# Patient Record
Sex: MALE | Race: WHITE | NOT HISPANIC OR LATINO | URBAN - METROPOLITAN AREA
[De-identification: names, ages, dates, MRNs, and addresses within clinical notes are randomized per-mention and may not be internally consistent; named-entity substitution may affect disease eponyms.]

---

## 2016-06-30 RX ORDER — METFORMIN HYDROCHLORIDE 850 MG/1
1 TABLET ORAL
Qty: 0 | Refills: 0 | COMMUNITY
Start: 2016-06-30

## 2017-08-21 ENCOUNTER — INPATIENT (INPATIENT)
Facility: HOSPITAL | Age: 62
LOS: 0 days | Discharge: ANOTHER HOSPITAL | DRG: 313 | End: 2017-08-21
Attending: INTERNAL MEDICINE | Admitting: INTERNAL MEDICINE
Payer: MEDICAID

## 2017-08-21 ENCOUNTER — INPATIENT (INPATIENT)
Facility: HOSPITAL | Age: 62
LOS: 0 days | Discharge: HOME | DRG: 287 | End: 2017-08-21
Attending: INTERNAL MEDICINE | Admitting: INTERNAL MEDICINE
Payer: MEDICAID

## 2017-08-21 ENCOUNTER — TRANSCRIPTION ENCOUNTER (OUTPATIENT)
Age: 62
End: 2017-08-21

## 2017-08-21 VITALS
SYSTOLIC BLOOD PRESSURE: 161 MMHG | DIASTOLIC BLOOD PRESSURE: 87 MMHG | HEIGHT: 70.08 IN | OXYGEN SATURATION: 98 % | WEIGHT: 259.93 LBS | RESPIRATION RATE: 18 BRPM | HEART RATE: 60 BPM | TEMPERATURE: 98 F

## 2017-08-21 VITALS
HEIGHT: 71 IN | SYSTOLIC BLOOD PRESSURE: 164 MMHG | HEART RATE: 74 BPM | RESPIRATION RATE: 16 BRPM | OXYGEN SATURATION: 95 % | TEMPERATURE: 98 F | WEIGHT: 265 LBS | DIASTOLIC BLOOD PRESSURE: 79 MMHG

## 2017-08-21 VITALS
DIASTOLIC BLOOD PRESSURE: 74 MMHG | TEMPERATURE: 98 F | OXYGEN SATURATION: 97 % | HEART RATE: 93 BPM | SYSTOLIC BLOOD PRESSURE: 149 MMHG | RESPIRATION RATE: 18 BRPM

## 2017-08-21 VITALS — WEIGHT: 263.89 LBS

## 2017-08-21 DIAGNOSIS — I10 ESSENTIAL (PRIMARY) HYPERTENSION: Chronic | ICD-10-CM

## 2017-08-21 DIAGNOSIS — Z95.5 PRESENCE OF CORONARY ANGIOPLASTY IMPLANT AND GRAFT: Chronic | ICD-10-CM

## 2017-08-21 DIAGNOSIS — R07.89 OTHER CHEST PAIN: ICD-10-CM

## 2017-08-21 DIAGNOSIS — R07.9 CHEST PAIN, UNSPECIFIED: ICD-10-CM

## 2017-08-21 LAB
ANION GAP SERPL CALC-SCNC: 11 MMOL/L — SIGNIFICANT CHANGE UP (ref 5–17)
BASOPHILS # BLD AUTO: 0.1 K/UL — SIGNIFICANT CHANGE UP (ref 0–0.2)
BASOPHILS NFR BLD AUTO: 1.5 % — SIGNIFICANT CHANGE UP (ref 0–2)
BUN SERPL-MCNC: 10 MG/DL — SIGNIFICANT CHANGE UP (ref 7–18)
CALCIUM SERPL-MCNC: 9 MG/DL — SIGNIFICANT CHANGE UP (ref 8.4–10.5)
CHLORIDE SERPL-SCNC: 107 MMOL/L — SIGNIFICANT CHANGE UP (ref 96–108)
CK MB BLD-MCNC: 1.4 % — SIGNIFICANT CHANGE UP (ref 0–3.5)
CK MB CFR SERPL CALC: 1.6 NG/ML — SIGNIFICANT CHANGE UP (ref 0–3.6)
CK SERPL-CCNC: 112 U/L — SIGNIFICANT CHANGE UP (ref 35–232)
CO2 SERPL-SCNC: 24 MMOL/L — SIGNIFICANT CHANGE UP (ref 22–31)
CREAT SERPL-MCNC: 0.82 MG/DL — SIGNIFICANT CHANGE UP (ref 0.5–1.3)
EOSINOPHIL # BLD AUTO: 0.1 K/UL — SIGNIFICANT CHANGE UP (ref 0–0.5)
EOSINOPHIL NFR BLD AUTO: 2.1 % — SIGNIFICANT CHANGE UP (ref 0–6)
GLUCOSE SERPL-MCNC: 108 MG/DL — HIGH (ref 70–99)
HCT VFR BLD CALC: 45.5 % — SIGNIFICANT CHANGE UP (ref 39–50)
HGB BLD-MCNC: 15.2 G/DL — SIGNIFICANT CHANGE UP (ref 13–17)
LYMPHOCYTES # BLD AUTO: 1.6 K/UL — SIGNIFICANT CHANGE UP (ref 1–3.3)
LYMPHOCYTES # BLD AUTO: 30.1 % — SIGNIFICANT CHANGE UP (ref 13–44)
MCHC RBC-ENTMCNC: 29.6 PG — SIGNIFICANT CHANGE UP (ref 27–34)
MCHC RBC-ENTMCNC: 33.3 GM/DL — SIGNIFICANT CHANGE UP (ref 32–36)
MCV RBC AUTO: 88.9 FL — SIGNIFICANT CHANGE UP (ref 80–100)
MONOCYTES # BLD AUTO: 0.5 K/UL — SIGNIFICANT CHANGE UP (ref 0–0.9)
MONOCYTES NFR BLD AUTO: 9.1 % — SIGNIFICANT CHANGE UP (ref 2–14)
NEUTROPHILS # BLD AUTO: 3.1 K/UL — SIGNIFICANT CHANGE UP (ref 1.8–7.4)
NEUTROPHILS NFR BLD AUTO: 57.2 % — SIGNIFICANT CHANGE UP (ref 43–77)
PLATELET # BLD AUTO: 191 K/UL — SIGNIFICANT CHANGE UP (ref 150–400)
POTASSIUM SERPL-MCNC: 3.6 MMOL/L — SIGNIFICANT CHANGE UP (ref 3.5–5.3)
POTASSIUM SERPL-SCNC: 3.6 MMOL/L — SIGNIFICANT CHANGE UP (ref 3.5–5.3)
RBC # BLD: 5.12 M/UL — SIGNIFICANT CHANGE UP (ref 4.2–5.8)
RBC # FLD: 12 % — SIGNIFICANT CHANGE UP (ref 10.3–14.5)
SODIUM SERPL-SCNC: 142 MMOL/L — SIGNIFICANT CHANGE UP (ref 135–145)
TROPONIN I SERPL-MCNC: <0.015 NG/ML — SIGNIFICANT CHANGE UP (ref 0–0.04)
WBC # BLD: 5.4 K/UL — SIGNIFICANT CHANGE UP (ref 3.8–10.5)
WBC # FLD AUTO: 5.4 K/UL — SIGNIFICANT CHANGE UP (ref 3.8–10.5)

## 2017-08-21 PROCEDURE — C1769: CPT

## 2017-08-21 PROCEDURE — 93458 L HRT ARTERY/VENTRICLE ANGIO: CPT | Mod: 26,GC

## 2017-08-21 PROCEDURE — 93458 L HRT ARTERY/VENTRICLE ANGIO: CPT

## 2017-08-21 PROCEDURE — 99152 MOD SED SAME PHYS/QHP 5/>YRS: CPT

## 2017-08-21 PROCEDURE — 99285 EMERGENCY DEPT VISIT HI MDM: CPT

## 2017-08-21 PROCEDURE — C1887: CPT

## 2017-08-21 PROCEDURE — 71020: CPT | Mod: 26

## 2017-08-21 PROCEDURE — 93010 ELECTROCARDIOGRAM REPORT: CPT

## 2017-08-21 PROCEDURE — 93005 ELECTROCARDIOGRAM TRACING: CPT

## 2017-08-21 PROCEDURE — C1894: CPT

## 2017-08-21 PROCEDURE — 99152 MOD SED SAME PHYS/QHP 5/>YRS: CPT | Mod: GC

## 2017-08-21 RX ORDER — SODIUM CHLORIDE 9 MG/ML
1000 INJECTION, SOLUTION INTRAVENOUS
Qty: 0 | Refills: 0 | Status: DISCONTINUED | OUTPATIENT
Start: 2017-08-21 | End: 2017-08-21

## 2017-08-21 RX ORDER — CLONAZEPAM 1 MG
1 TABLET ORAL
Qty: 0 | Refills: 0 | Status: DISCONTINUED | OUTPATIENT
Start: 2017-08-21 | End: 2017-08-21

## 2017-08-21 RX ORDER — DEXTROSE 50 % IN WATER 50 %
1 SYRINGE (ML) INTRAVENOUS ONCE
Qty: 0 | Refills: 0 | Status: DISCONTINUED | OUTPATIENT
Start: 2017-08-21 | End: 2017-08-21

## 2017-08-21 RX ORDER — AMLODIPINE BESYLATE 2.5 MG/1
1 TABLET ORAL
Qty: 30 | Refills: 0 | OUTPATIENT
Start: 2017-08-21 | End: 2017-09-20

## 2017-08-21 RX ORDER — NITROGLYCERIN 6.5 MG
0.3 CAPSULE, EXTENDED RELEASE ORAL
Qty: 0 | Refills: 0 | Status: DISCONTINUED | OUTPATIENT
Start: 2017-08-21 | End: 2017-08-21

## 2017-08-21 RX ORDER — MORPHINE SULFATE 50 MG/1
2 CAPSULE, EXTENDED RELEASE ORAL ONCE
Qty: 0 | Refills: 0 | Status: DISCONTINUED | OUTPATIENT
Start: 2017-08-21 | End: 2017-08-21

## 2017-08-21 RX ORDER — INSULIN LISPRO 100/ML
7 VIAL (ML) SUBCUTANEOUS
Qty: 0 | Refills: 0 | Status: DISCONTINUED | OUTPATIENT
Start: 2017-08-21 | End: 2017-08-21

## 2017-08-21 RX ORDER — DEXTROSE 50 % IN WATER 50 %
12.5 SYRINGE (ML) INTRAVENOUS ONCE
Qty: 0 | Refills: 0 | Status: DISCONTINUED | OUTPATIENT
Start: 2017-08-21 | End: 2017-08-21

## 2017-08-21 RX ORDER — INSULIN LISPRO 100/ML
VIAL (ML) SUBCUTANEOUS AT BEDTIME
Qty: 0 | Refills: 0 | Status: DISCONTINUED | OUTPATIENT
Start: 2017-08-21 | End: 2017-08-21

## 2017-08-21 RX ORDER — CLOPIDOGREL BISULFATE 75 MG/1
75 TABLET, FILM COATED ORAL DAILY
Qty: 0 | Refills: 0 | Status: DISCONTINUED | OUTPATIENT
Start: 2017-08-21 | End: 2017-08-21

## 2017-08-21 RX ORDER — INSULIN LISPRO 100/ML
VIAL (ML) SUBCUTANEOUS
Qty: 0 | Refills: 0 | Status: DISCONTINUED | OUTPATIENT
Start: 2017-08-21 | End: 2017-08-21

## 2017-08-21 RX ORDER — AMLODIPINE BESYLATE 2.5 MG/1
2.5 TABLET ORAL DAILY
Qty: 0 | Refills: 0 | Status: DISCONTINUED | OUTPATIENT
Start: 2017-08-21 | End: 2017-08-21

## 2017-08-21 RX ORDER — INSULIN GLARGINE 100 [IU]/ML
70 INJECTION, SOLUTION SUBCUTANEOUS AT BEDTIME
Qty: 0 | Refills: 0 | Status: DISCONTINUED | OUTPATIENT
Start: 2017-08-21 | End: 2017-08-21

## 2017-08-21 RX ORDER — METOPROLOL TARTRATE 50 MG
200 TABLET ORAL DAILY
Qty: 0 | Refills: 0 | Status: DISCONTINUED | OUTPATIENT
Start: 2017-08-21 | End: 2017-08-21

## 2017-08-21 RX ORDER — ASPIRIN/CALCIUM CARB/MAGNESIUM 324 MG
325 TABLET ORAL DAILY
Qty: 0 | Refills: 0 | Status: DISCONTINUED | OUTPATIENT
Start: 2017-08-21 | End: 2017-08-21

## 2017-08-21 RX ORDER — PANTOPRAZOLE SODIUM 20 MG/1
40 TABLET, DELAYED RELEASE ORAL
Qty: 0 | Refills: 0 | Status: DISCONTINUED | OUTPATIENT
Start: 2017-08-21 | End: 2017-08-21

## 2017-08-21 RX ORDER — TAMSULOSIN HYDROCHLORIDE 0.4 MG/1
0.4 CAPSULE ORAL AT BEDTIME
Qty: 0 | Refills: 0 | Status: DISCONTINUED | OUTPATIENT
Start: 2017-08-21 | End: 2017-08-21

## 2017-08-21 RX ORDER — ESCITALOPRAM OXALATE 10 MG/1
20 TABLET, FILM COATED ORAL DAILY
Qty: 0 | Refills: 0 | Status: DISCONTINUED | OUTPATIENT
Start: 2017-08-21 | End: 2017-08-21

## 2017-08-21 RX ORDER — DEXTROSE 50 % IN WATER 50 %
25 SYRINGE (ML) INTRAVENOUS ONCE
Qty: 0 | Refills: 0 | Status: DISCONTINUED | OUTPATIENT
Start: 2017-08-21 | End: 2017-08-21

## 2017-08-21 RX ORDER — ATORVASTATIN CALCIUM 80 MG/1
80 TABLET, FILM COATED ORAL AT BEDTIME
Qty: 0 | Refills: 0 | Status: DISCONTINUED | OUTPATIENT
Start: 2017-08-21 | End: 2017-08-21

## 2017-08-21 RX ORDER — RANOLAZINE 500 MG/1
500 TABLET, FILM COATED, EXTENDED RELEASE ORAL
Qty: 0 | Refills: 0 | Status: DISCONTINUED | OUTPATIENT
Start: 2017-08-21 | End: 2017-08-21

## 2017-08-21 RX ORDER — ASPIRIN/CALCIUM CARB/MAGNESIUM 324 MG
325 TABLET ORAL ONCE
Qty: 0 | Refills: 0 | Status: COMPLETED | OUTPATIENT
Start: 2017-08-21 | End: 2017-08-21

## 2017-08-21 RX ORDER — GLUCAGON INJECTION, SOLUTION 0.5 MG/.1ML
1 INJECTION, SOLUTION SUBCUTANEOUS ONCE
Qty: 0 | Refills: 0 | Status: DISCONTINUED | OUTPATIENT
Start: 2017-08-21 | End: 2017-08-21

## 2017-08-21 RX ADMIN — MORPHINE SULFATE 2 MILLIGRAM(S): 50 CAPSULE, EXTENDED RELEASE ORAL at 13:33

## 2017-08-21 RX ADMIN — Medication 325 MILLIGRAM(S): at 16:22

## 2017-08-21 RX ADMIN — Medication 0.3 MILLIGRAM(S): at 12:13

## 2017-08-21 NOTE — DISCHARGE NOTE ADULT - HOSPITAL COURSE
60 y/o male with PMHx of CAD, Coronary Stent, T 2 DM on insulin pump with Hg A1 C 8.6 ( endocrinologist Dr Hagen), GERD, GI Bleed, MI, Hemorrhoids, HTN, HLD, and Coronary Angiogram presents to the [facility] ED c/o CP x5 days. Pt states the pain feels like pressure in his chest and L arm, which worsened and felt like burning today. Pt also notes associated symptoms of dizziness and SOB. Pt states he took NTG to no relief of symptoms. Cardiac enzyme negative x 1  Pt denies fever, chills, nausea, vomiting, diaphoresis, leg swelling, or any other complaints. Insuli pump was removed and endocrine consult was called. Pt underwent a cardiac angiogram via RRA. Patient was found to have EF 40 - 45% pLAd  stent patent,  distal LAD 70% ( no intervention )  Cx NMl. RCA prox stent  mild - moderate FSR/distal RCA ISR mild. Right radial site without bleeding or hematoma. right hand with good cap refill. Increase Norvasc 5 mg po qd for max antianginal therapy. Discussed case with Endocrinolgy - patient will resume  insulin pump and follow up with his Endocrinologist dr Hagen

## 2017-08-21 NOTE — ED ADULT TRIAGE NOTE - CHIEF COMPLAINT QUOTE
c/o on and off Left sided chest pain x 5 days worsened today w/ dizziness and pain going to the left arm

## 2017-08-21 NOTE — ED PROVIDER NOTE - PROGRESS NOTE DETAILS
Case discussed with patients cardiologist Dr. Blue who wants the patient transferred for Cath.  He will arrange an accepting physician. Spoke with Dr. Blue whom now recommends admission here.  pt will be transferred as inpatient. medicine team has found an accepting physician.  Pt to be transferred.

## 2017-08-21 NOTE — CHART NOTE - NSCHARTNOTEFT_GEN_A_CORE
Patient is a transfer from [facility] with insulin pump in place. Insulin pump removed ( FS 91)  and endocrinology consult called. Pump settings reviewed with Dr Tesfaye. She recommend insulin pump remains off and patient be placed on  Lantus 70 units at HS, 7 units Humolog with each meal and moderate SSI coverage before meals and at HS. We will also check finger stick at 0300. Plan reviewed with patient and his wife and they agree with the plan

## 2017-08-21 NOTE — H&P CARDIOLOGY - HISTORY OF PRESENT ILLNESS
62 y/o male with PMHx of CAD, Coronary Stent, T 2 DM on insulin pump with Hg A1 C 8.6 ( endocrinologist Dr Hagen), GERD, GI Bleed, MI, Hemorrhoids, HTN, HLD, and Coronary Angiogram presents to the [facility] ED c/o CP x5 days. Pt states the pain feels like pressure in his chest and L arm, which worsened and felt like burning today. Pt also notes associated symptoms of dizziness and SOB. Pt states he took NTG to no relief of symptoms. Cardiac enzyme negative x 1  Pt denies fever, chills, nausea, vomiting, diaphoresis, leg swelling, or any other complaints. NKDA. PMD: Dr. Marks 62 y/o male with PMHx of CAD, Coronary Stent, T 2 DM on insulin pump with Hg A1 C 8.6 ( endocrinologist Dr Hagen), GERD, GI Bleed, MI, Hemorrhoids, HTN, HLD, and Coronary Angiogram presents to the [facility] ED c/o CP x5 days. Pt states the pain feels like pressure in his chest and L arm, which worsened and felt like burning today. Pt also notes associated symptoms of dizziness and SOB. Pt states he took NTG to no relief of symptoms. Cardiac enzyme negative x 1  Pt denies fever, chills, nausea, vomiting, diaphoresis, leg swelling, or any other complaints.     PMD: Dr. Shanks\  Insulin Pump [facility] [facility]

## 2017-08-21 NOTE — DISCHARGE NOTE ADULT - ADDITIONAL INSTRUCTIONS
No heavy lifting,  pushing, pulling with affected arm for 2 weeks.  No strenuous  activity  for 2 weeks. No sex for 1 week.  No driving for 2 days. You may shower 24 hours following procedure but avoid baths and swimming for 1 week. Check wrist site for bleeding and/or swelling daily following procedure. Call your doctor/cardiologist immediately should it occur or if you have increased/persistent pain at the site. Follow up with your cardiologist in 1- 2 weeks. You may call [facility] at [phone] or [phone] after office hours and weekends with any questions or concerns following your procedure.

## 2017-08-21 NOTE — DISCHARGE NOTE ADULT - NS AS ACTIVITY OBS
Walking-Indoors allowed/Do not drive or operate machinery/Do not make important decisions/No Heavy lifting/straining

## 2017-08-21 NOTE — DISCHARGE NOTE ADULT - CARE PLAN
Principal Discharge DX:	CAD (coronary artery disease)  Goal:	patient will be free of chest pain  Instructions for follow-up, activity and diet:	Coronary artery disease is a condition where the arteries the supply the heart muscle get clogges with fatty deposits & puts you at risk for a heart attack  Call your doctor if you have any new pain, pressure, or discomfort in the center of your chest, pain, tingling or discomfort in arms, back, neck, jaw, or stomach, shortness of breath, nausea, vomiting, burping or heartburn, sweating, cold and clammy skin, racing or abnormal heartbeat for more than 10 minutes or if they keep coming & going.  Call 911 and do not tr to get to hospital by care  You can help yourself with lefestyle changes (quitting smoking if you smoke), eat lots of fruits & vegetables & low fat dairy products, not a lot of meat & fatty foods, walk or some form of physical activity most days of the week, lose weight if you are overweight  Take your cardiac medication as prescribed to lower cholesterol, to lower blood pressure, aspirin to prevent blood clots, and diabetes control  Make sure to keep appointments with doctor for cardiac follow up care  Secondary Diagnosis:	Diabetes mellitus  Goal:	Your hemoglobin A1C will be at a normal range (normal range is from 6-8)  Instructions for follow-up, activity and diet:	Continue to follow with your primary care MD or your endocrinologist. Discuss what the goal hemoglobin A1C level is for you.  Follow a heart healthy diabetic diet. If you check your fingerstick glucose at home, call your MD if it is greater than 250mg/dL on 2 occasions or less than 100mg/dL on 2 occasions. Know signs of low blood sugar, such as: dizziness, shakiness, sweating, confusion, hunger, nervousness- drink 4 ounces apple juice if occurs and call your doctor. Know early signs of high blood sugar, such as: frequent urination, increased thirst, blurry vision, fatigue, headache - call your doctor if this occurs.  Secondary Diagnosis:	HTN (hypertension)  Goal:	Your blood pressure will be controlled.  Instructions for follow-up, activity and diet:	Continue with your blood pressure medications; eat a heart healthy diet with low salt diet; exercise regularly (consult with your physician or cardiologist first); maintain a heart healthy weight; if you smoke - quit (A resource to help you stop smoking is the [facility] for [employer] Control – phone number [phone].); include healthy ways to manage stress. Continue to follow with your primary care physician or cardiologist.  Secondary Diagnosis:	Hypercholesterolemia  Goal:	LDL<70  Instructions for follow-up, activity and diet:	Goal is to keep LDL<70. Continue with your cholesterol medications as prescribed. Eat a heart healthy diet that is low in saturated fats and salt, and includes whole grains, fruits, vegetables and lean protein; exercise regularly (consult with your physician or cardiologist first); maintain a heart healthy weight; if you smoke - quit (A resource to help you stop smoking is the [facility] for Tobacco Control – phone number [phone].). Continue to follow with your primary physician or cardiologist. Principal Discharge DX:	CAD (coronary artery disease)  Goal:	patient will be free of chest pain  Instructions for follow-up, activity and diet:	Coronary artery disease is a condition where the arteries the supply the heart muscle get clogges with fatty deposits & puts you at risk for a heart attack  Call your doctor if you have any new pain, pressure, or discomfort in the center of your chest, pain, tingling or discomfort in arms, back, neck, jaw, or stomach, shortness of breath, nausea, vomiting, burping or heartburn, sweating, cold and clammy skin, racing or abnormal heartbeat for more than 10 minutes or if they keep coming & going.  Call 911 and do not tr to get to hospital by care  You can help yourself with lefestyle changes (quitting smoking if you smoke), eat lots of fruits & vegetables & low fat dairy products, not a lot of meat & fatty foods, walk or some form of physical activity most days of the week, lose weight if you are overweight  Take your cardiac medication as prescribed to lower cholesterol, to lower blood pressure, aspirin to prevent blood clots, and diabetes control  Make sure to keep appointments with doctor for cardiac follow up care  Secondary Diagnosis:	Diabetes mellitus  Goal:	Your hemoglobin A1C will be at a normal range (normal range is from 6-8)  Instructions for follow-up, activity and diet:	Continue to follow with your primary care MD or your endocrinologist. Discuss what the goal hemoglobin A1C level is for you.  Follow a heart healthy diabetic diet. If you check your fingerstick glucose at home, call your MD if it is greater than 250mg/dL on 2 occasions or less than 100mg/dL on 2 occasions. Know signs of low blood sugar, such as: dizziness, shakiness, sweating, confusion, hunger, nervousness- drink 4 ounces apple juice if occurs and call your doctor. Know early signs of high blood sugar, such as: frequent urination, increased thirst, blurry vision, fatigue, headache - call your doctor if this occurs.  Secondary Diagnosis:	HTN (hypertension)  Goal:	Your blood pressure will be controlled.  Instructions for follow-up, activity and diet:	Continue with your blood pressure medications; eat a heart healthy diet with low salt diet; exercise regularly (consult with your physician or cardiologist first); maintain a heart healthy weight; if you smoke - quit (A resource to help you stop smoking is the [facility] for [employer] Control – phone number [phone].); include healthy ways to manage stress. Continue to follow with your primary care physician or cardiologist.  Secondary Diagnosis:	Hypercholesterolemia  Goal:	LDL<70  Instructions for follow-up, activity and diet:	Goal is to keep LDL<70. Continue with your cholesterol medications as prescribed. Eat a heart healthy diet that is low in saturated fats and salt, and includes whole grains, fruits, vegetables and lean protein; exercise regularly (consult with your physician or cardiologist first); maintain a heart healthy weight; if you smoke - quit (A resource to help you stop smoking is the [facility] for Tobacco Control – phone number [phone].). Continue to follow with your primary physician or cardiologist.

## 2017-08-21 NOTE — CHART NOTE - NSCHARTNOTEFT_GEN_A_CORE
Patient transferred to [facility] prior to H & P being signed.    Accepting Physician - Dr. Maxi Mendez  Cardiology - Dr. Ankit Yeh

## 2017-08-21 NOTE — CONSULT NOTE ADULT - SUBJECTIVE AND OBJECTIVE BOX
CHIEF COMPLAINT: Patient is a 61y old  Male who presents with a chief complaint of CP x 5 days, worse today, unrelieved by ntg    HPI:   Patient seen and examined.     PAST MEDICAL & SURGICAL HISTORY:  Hemorrhoids  History of coronary angiogram: in July of 2011 with STENTS to RPDA &amp; RCA  H/O: GI Bleed: post colonoscopy in 2010, treated with blood transfusion.  Hypercholesterolemia  Coronary Stent: from 1998 - 2011 (total 5 stents)  CAD (Coronary Artery Disease)  Heart Attack: 1998 with stent and 2001 with stent, stents again in approx 2006 and 2/08  GERD (Gastroesophageal Reflux Disease)  Diabetes Mellitus: with neuropathy  HTN  Benign essential HTN  Stented coronary artery: x 5  No Past Surgical History      Allergies    No Known Allergies    Intolerances        MEDICATIONS  (STANDING):  aspirin 325 milliGRAM(s) Oral Once      MEDICATIONS  (PRN):  nitroglycerin     SubLingual 0.3 milliGRAM(s) SubLingual every 5 minutes PRN Chest Pain       Medications up to date at time of exam.    FAMILY HISTORY:  Family history of diabetes mellitus  Family history of cardiac disorder      SOCIAL HISTORY  Smoking History: [   ] smoking/smoke exposure, [   ] former smoker, [   ] denies smoking  Living Condition: [   ] apartment, [   ] private house  Work History:   Travel History: denies recent travel  Illicit Substance Use: denies  Alcohol Use: denies    REVIEW OF SYSTEMS:    CONSTITUTIONAL:  denies fevers, chills, sweats, weight loss    HEENT:  denies diplopia or blurred vision, sore throat or runny nose.    CARDIOVASCULAR:  denies pressure, squeezing, +tightness, +heaviness about the chest; no palpitations.    RESPIRATORY:  + SOB, cough, , wheezing.    GASTROINTESTINAL:  denies abdominal pain, nausea, vomiting or diarrhea.    GENITOURINARY: denies dysuria, frequency or urgency.    NEUROLOGIC:  denies numbness, tingling, seizures or weakness.    PSYCHIATRIC:  denies disorder of thought or mood.    MSK: denies swelling, redness      PHYSICAL EXAMINATION:    GENERAL: The patient is a well-developed, well-nourished, in no apparent distress.     Vital Signs Last 24 Hrs  T(C): 36.8 (21 Aug 2017 11:28), Max: 36.8 (21 Aug 2017 11:28)  T(F): 98.3 (21 Aug 2017 11:28), Max: 98.3 (21 Aug 2017 11:28)  HR: 73 (21 Aug 2017 11:28) (60 - 73)  BP: 133/72 (21 Aug 2017 11:28) (133/72 - 161/87)  BP(mean): --  RR: 18 (21 Aug 2017 11:28) (18 - 18)  SpO2: 96% (21 Aug 2017 11:28) (96% - 98%)    HEENT: head is normocephalic and atraumatic.     NECK: supple, no palpable adenopathy.    LUNGS: clear to auscultation, no wheezing, rales, or rhonchi.    HEART: regular rate and rhythm + II/VI HSM @ LSB    ABDOMEN: soft, nontender, and nondistended.     EXTREMITIES: without any cyanosis, clubbing, rash, lesions or edema.    NEUROLOGIC: awake, alert.    SKIN: warm, dry, good turgor.      LABS:                        15.2   5.4   )-----------( 191      ( 21 Aug 2017 10:57 )             45.5     08-21    142  |  107  |  10  ----------------------------<  108<H>  3.6   |  24  |  0.82    Ca    9.0      21 Aug 2017 10:57            CARDIAC MARKERS ( 21 Aug 2017 10:57 )  <0.015 ng/mL / x     / 112 U/L / x     / 1.6 ng/mL                Troponin <0.015 08-21 @ 10:57   08-21 @ 10:57  CKMB -- 08-21 @ 10:57  .    MICROBIOLOGY: (if applicable)    RADIOLOGY & ADDITIONAL STUDIES:  EKG:   CXR:  ECHO:  TELE:    IMPRESSION: 61y Male PAST MEDICAL & SURGICAL HISTORY:  Hemorrhoids  History of coronary angiogram: in July of 2011 with STENTS to RPDA &amp; RCA  H/O: GI Bleed: post colonoscopy in 2010, treated with blood transfusion.  Hypercholesterolemia  Coronary Stent: from 1998 - 2011 (total 5 stents)  CAD (Coronary Artery Disease)  Heart Attack: 1998 with stent and 2001 with stent, stents again in approx 2006 and 2/08  GERD (Gastroesophageal Reflux Disease)  Diabetes Mellitus: with neuropathy  HTN  Benign essential HTN  Stented coronary artery: x 5  No Past Surgical History       Patient is well known to me hx of CAD w/ multiple stents. Patient p/w CP x 5 days, unrelieved by nitro    RECOMMENDATIONS:     - admit to tele, trend [facility]   - will xfer to [place] for cardiac cath w/ Dr. Ankit Yeh    - plan of care discussed w/ patient and wife

## 2017-08-21 NOTE — DISCHARGE NOTE ADULT - PLAN OF CARE
patient will be free of chest pain Coronary artery disease is a condition where the arteries the supply the heart muscle get clogges with fatty deposits & puts you at risk for a heart attack  Call your doctor if you have any new pain, pressure, or discomfort in the center of your chest, pain, tingling or discomfort in arms, back, neck, jaw, or stomach, shortness of breath, nausea, vomiting, burping or heartburn, sweating, cold and clammy skin, racing or abnormal heartbeat for more than 10 minutes or if they keep coming & going.  Call 911 and do not tr to get to hospital by care  You can help yourself with lefestyle changes (quitting smoking if you smoke), eat lots of fruits & vegetables & low fat dairy products, not a lot of meat & fatty foods, walk or some form of physical activity most days of the week, lose weight if you are overweight  Take your cardiac medication as prescribed to lower cholesterol, to lower blood pressure, aspirin to prevent blood clots, and diabetes control  Make sure to keep appointments with doctor for cardiac follow up care Continue to follow with your primary care MD or your endocrinologist. Discuss what the goal hemoglobin A1C level is for you.  Follow a heart healthy diabetic diet. If you check your fingerstick glucose at home, call your MD if it is greater than 250mg/dL on 2 occasions or less than 100mg/dL on 2 occasions. Know signs of low blood sugar, such as: dizziness, shakiness, sweating, confusion, hunger, nervousness- drink 4 ounces apple juice if occurs and call your doctor. Know early signs of high blood sugar, such as: frequent urination, increased thirst, blurry vision, fatigue, headache - call your doctor if this occurs. Your hemoglobin A1C will be at a normal range (normal range is from 6-8) Your blood pressure will be controlled. Continue with your blood pressure medications; eat a heart healthy diet with low salt diet; exercise regularly (consult with your physician or cardiologist first); maintain a heart healthy weight; if you smoke - quit (A resource to help you stop smoking is the [facility] for Tobacco Control – phone number [phone].); include healthy ways to manage stress. Continue to follow with your primary care physician or cardiologist. LDL<70 Goal is to keep LDL<70. Continue with your cholesterol medications as prescribed. Eat a heart healthy diet that is low in saturated fats and salt, and includes whole grains, fruits, vegetables and lean protein; exercise regularly (consult with your physician or cardiologist first); maintain a heart healthy weight; if you smoke - quit (A resource to help you stop smoking is the [facility] for Tobacco Control – phone number [phone].). Continue to follow with your primary physician or cardiologist.

## 2017-08-21 NOTE — DISCHARGE NOTE ADULT - FINDINGS/TREATMENT
Patient was found to have EF 40 - 45% pLAd  stent patent,  distal LAD 70% ( no intervention )  Cx NMl. RCA prox stent  mild - moderate FSR/distal RCA ISR mild.

## 2017-08-21 NOTE — CONSULT NOTE ADULT - SUBJECTIVE AND OBJECTIVE BOX
CHIEF COMPLAINT: Patient is a 61y old  Male who presents with a chief complaint of SOB    HPI:   Patient seen and examined.     PAST MEDICAL & SURGICAL HISTORY:  Hemorrhoids  History of coronary angiogram: in July of 2011 with STENTS to RPDA &amp; RCA  H/O: GI Bleed: post colonoscopy in 2010, treated with blood transfusion.  Hypercholesterolemia  Coronary Stent: from 1998 - 2011 (total 5 stents)  CAD (Coronary Artery Disease)  Heart Attack: 1998 with stent and 2001 with stent, stents again in approx 2006 and 2/08  GERD (Gastroesophageal Reflux Disease)  Diabetes Mellitus: with neuropathy  HTN  Benign essential HTN  Stented coronary artery: x 5  No Past Surgical History      Allergies    No Known Allergies    Intolerances        MEDICATIONS  (STANDING):      MEDICATIONS  (PRN):  nitroglycerin     SubLingual 0.3 milliGRAM(s) SubLingual every 5 minutes PRN Chest Pain   Medications up to date at time of exam.    FAMILY HISTORY:  Family history of diabetes mellitus (Mother)  Family history of cardiac disorder (Father, Mother, Grandparent)      SOCIAL HISTORY  Smoking History: [   ] smoking/smoke exposure, [   ] former smoker, [  ] denies smoking  Living Condition: [   ] apartment, [   ] private house  Work History:   Travel History: denies recent travel  Illicit Substance Use: denies  Alcohol Use: denies    REVIEW OF SYSTEMS:    CONSTITUTIONAL:  denies fevers, chills, sweats, weight loss    HEENT:  denies diplopia or blurred vision, sore throat or runny nose.    CARDIOVASCULAR:  denies pressure, squeezing, tightness, or heaviness about the chest; no palpitations.    RESPIRATORY:  denies SOB, cough, + , wheezing.    GASTROINTESTINAL:  denies abdominal pain, nausea, vomiting or diarrhea.    GENITOURINARY: denies dysuria, frequency or urgency.    NEUROLOGIC:  denies numbness, tingling, seizures or weakness.    PSYCHIATRIC:  denies disorder of thought or mood.    MSK: denies swelling, redness      PHYSICAL EXAMINATION:    GENERAL: The patient is a well-developed, well-nourished, in no apparent distress.     Vital Signs Last 24 Hrs  T(C): 36.6 (21 Aug 2017 17:29), Max: 36.8 (21 Aug 2017 11:28)  T(F): 97.8 (21 Aug 2017 17:29), Max: 98.3 (21 Aug 2017 11:28)  HR: 74 (21 Aug 2017 17:29) (60 - 93)  BP: 164/79 (21 Aug 2017 17:29) (133/72 - 164/79)  BP(mean): 107 (21 Aug 2017 17:29) (107 - 107)  RR: 16 (21 Aug 2017 17:29) (16 - 18)  SpO2: 95% (21 Aug 2017 17:29) (95% - 98%)   (if applicable)    Chest Tube (if applicable)    HEENT: Head is normocephalic and atraumatic. .    NECK: Supple, no palpable adenopathy.    LUNGS: Clear to auscultation, no wheezing, rales, or rhonchi.    HEART: Regular rate and rhythm without murmur.    ABDOMEN: Soft, nontender, and nondistended.  No hepatosplenomegaly is noted.    EXTREMITIES: Without any cyanosis, clubbing, rash, lesions or edema.    NEUROLOGIC: Awake, alert.    SKIN: Warm, dry, good turgor.      LABS:                        15.2   5.4   )-----------( 191      ( 21 Aug 2017 10:57 )             45.5     08-21    142  |  107  |  10  ----------------------------<  108<H>  3.6   |  24  |  0.82    Ca    9.0      21 Aug 2017 10:57            CARDIAC MARKERS ( 21 Aug 2017 10:57 )  <0.015 ng/mL / x     / 112 U/L / x     / 1.6 ng/mL                MICROBIOLOGY: (if applicable)    RADIOLOGY & ADDITIONAL STUDIES:  EKG:   CXR:  ECHO:    IMPRESSION: 61y Male PAST MEDICAL & SURGICAL HISTORY:  Hemorrhoids  History of coronary angiogram: in July of 2011 with STENTS to RPDA &amp; RCA  H/O: GI Bleed: post colonoscopy in 2010, treated with blood transfusion.  Hypercholesterolemia  Coronary Stent: from 1998 - 2011 (total 5 stents)  CAD (Coronary Artery Disease)  Heart Attack: 1998 with stent and 2001 with stent, stents again in approx 2006 and 2/08  GERD (Gastroesophageal Reflux Disease)  Diabetes Mellitus: with neuropathy  HTN  Benign essential HTN  Stented coronary artery: x 5  No Past Surgical History       p/w SOB, CP x 5 days.    RECOMMENDATIONS:     - patient being transferred to [place] for cardiac cath   - SOB likely 2/2 CP   - o2 supp as needed   - DVT and GI prophylaxis.

## 2017-08-21 NOTE — DISCHARGE NOTE ADULT - PRINCIPAL DIAGNOSIS
CAD (coronary artery disease) 38.1 week GA female born to a 24 y/o   mother via . Maternal history complicated by asthma. Pregnancy complicated by PROM>18 hours (19 hours, at 2000 day prior to delivery) and chlamydia treated during 1st month of pregnancy. No maternal fever. Maternal blood type A+. Prenatal labs negative and nonreactive except for nonimmune to rubella. GBS positive on , treated adequately w/ ampicillin x5.  ROM <18hrs with clear fluid. Baby born vigorous and crying spontaneously. Warmed, dried, stimulated. Apgars 6/8. Apgar of 6 due to poor tone and not crying during 1st minute of life.     Since admission to the  nursery (NBN), baby has been feeding well, stooling and making wet diapers. Vitals have remained stable. Baby received routine NBN care. Discharge weight 3395 g down from birthweight of 3515 g, 3.41%. The baby lost an acceptable percentage of birthweight. Stable for discharge to home after receiving routine  care education and instructions to follow up with pediatrician.    Bilirubin was 7.9 at 33 hours of life, which is low intermediate risk zone.   Please see below for CCHD, audiology and hepatitis vaccine status.     Gen: NAD; well-appearing  HEENT: NC/AT; AFOF; red reflex intact; ears and nose clinically patent, normally set; no tags ; oropharynx clear  Skin: pink, warm, well-perfused, no rash  Resp: CTAB, even, non-labored breathing  Cardiac: RRR, normal S1 and S2; no murmurs; 2+ femoral pulses b/l  Abd: soft, NT/ND; +BS; no HSM; umbilicus c/d/I, 3 vessels  Extremities: FROM; no crepitus; Hips: negative O/B  : Skip I; no abnormalities; no hernia; anus patent  Neuro: +summer, suck, grasp, Babinski; good tone throughout 38.1 week GA female born to a 22 y/o   mother via . Maternal history complicated by asthma. Pregnancy complicated by PROM>18 hours (19 hours, at 2000 day prior to delivery) and chlamydia treated during 1st month of pregnancy. No maternal fever. Maternal blood type A+. Prenatal labs negative and nonreactive except for nonimmune to rubella. GBS positive on , treated adequately w/ ampicillin x5.  ROM <18hrs with clear fluid. Baby born vigorous and crying spontaneously. Warmed, dried, stimulated. Apgars 6/8. Apgar of 6 due to poor tone and not crying during 1st minute of life.     Since admission to the  nursery (NBN), baby has been feeding well, stooling and making wet diapers. Vitals have remained stable. Baby received routine NBN care. Discharge weight 3395 g down from birthweight of 3515 g, 3.41%. The baby lost an acceptable percentage of birthweight. Stable for discharge to home after receiving routine  care education and instructions to follow up with pediatrician.    Bilirubin was 7.9 at 33 hours of life, which is low intermediate risk zone.   Please see below for CCHD, audiology and hepatitis vaccine status.     Pediatric Attending Addendum:  I have read and agree with above PGY1 Discharge Note except for any changes detailed below.   I have spent > 30 minutes with the patient and the patient's family on direct patient care and discharge planning.  Discharge note will be faxed to appropriate outpatient pediatrician.  Plan to follow-up per above.  Please see above weight and bilirubin.     On prenatal labs, mom was found to be rubella non-immune. Recommended that mom follow up with Ob-Gyn for rubella vaccine.    Discharge Exam:  GEN: NAD alert active  HEENT:  AFOF, +RR b/l, MMM  CHEST: nml s1/s2, RRR, no murmur, lungs cta b/l  Abd: soft/nt/nd +bs no hsm  umbilical stump c/d/i  Hips: neg Ortolani/Osuna  : TS1  Neuro: +grasp/suck/summer  Skin: no abnormal rash    Marisol Ruiz MD

## 2017-08-21 NOTE — H&P CARDIOLOGY - FAMILY HISTORY
Father  Still living? Unknown  Family history of cardiac disorder, Age at diagnosis: Age Unknown     Mother  Still living? No  Family history of cardiac disorder, Age at diagnosis: Age Unknown  Family history of diabetes mellitus, Age at diagnosis: Age Unknown     Grandparent  Still living? Unknown  Family history of cardiac disorder, Age at diagnosis: Age Unknown

## 2017-08-21 NOTE — DISCHARGE NOTE ADULT - MEDICATION SUMMARY - MEDICATIONS TO TAKE
I will START or STAY ON the medications listed below when I get home from the hospital:    aspirin 325 mg oral delayed release tablet  -- 1 tab(s) by mouth once a day  -- Indication: For heart    tamsulosin 0.4 mg oral capsule  -- 1 cap(s) by mouth once a day  -- Indication: For prostate    Ranexa 500 mg oral tablet, extended release  -- 1 tab(s) by mouth 2 times a day  -- Indication: For antianginal    KlonoPIN 1 mg oral tablet  -- 1 tab(s) by mouth 2 times a day, As Needed  -- Indication: For anxiety    Lexapro 20 mg oral tablet  -- 1 tab(s) by mouth once a day  -- Indication: For depression    Jentadueto 2.5 mg-500 mg oral tablet  -- 1 tab(s) by mouth 2 times a day  -- Indication: For diabetes    metFORMIN 500 mg oral tablet  -- 1 tab(s) by mouth 2 times a day resume on Thursday 8/24/17  -- Indication: For diabetes    Invokana 300 mg oral tablet  -- 1 tab(s) by mouth once a day  -- Indication: For diabetes    gemfibrozil 600 mg oral tablet  -- 1 tab(s) by mouth 2 times a day  -- Indication: For cholesterol    Crestor  -- 40 milligram(s) by mouth once a day  -- Indication: For cholesterol    niacin 500 mg oral capsule, extended release  -- 1 cap(s) by mouth once a day (at bedtime)  -- Indication: For cholesterol    Benicar HCT 40 mg-12.5 mg oral tablet  -- 1 tab(s) by mouth once a day  -- Indication: For blood pressure    clopidogrel 75 mg oral tablet  -- 1 tab(s) by mouth once a day  -- Indication: For heart    Ambien 10 mg oral tablet  -- 1 tab(s) by mouth once a day (at bedtime), As Needed insomnia  -- Indication: For sleep    metoprolol succinate 200 mg oral tablet, extended release  -- 1 tab(s) by mouth once a day  -- Indication: For heart    Norvasc 5 mg oral tablet  -- 1 tab(s) by mouth once a day MDD:one  -- It is very important that you take or use this exactly as directed.  Do not skip doses or discontinue unless directed by your doctor.  Some non-prescription drugs may aggravate your condition.  Read all labels carefully.  If a warning appears, check with your doctor before taking.    -- Indication: For blood pressure    Lovaza 1000 mg oral capsule  -- 2 cap(s) by mouth 2 times a day  -- Indication: For cholesterol    Protonix 40 mg oral delayed release tablet  -- 1 tab(s) by mouth once a day  -- Indication: For GERD    Drisdol 50,000 intl units oral capsule  -- 1 cap(s) by mouth once a week Monday  -- Indication: For supplement

## 2017-08-21 NOTE — DISCHARGE NOTE ADULT - MEDICATION SUMMARY - MEDICATIONS TO CHANGE
I will SWITCH the dose or number of times a day I take the medications listed below when I get home from the hospital:    Norvasc 2.5 mg oral tablet  -- 1 tab(s) by mouth once a day

## 2017-08-21 NOTE — H&P CARDIOLOGY - TOBACCO, LAST USE DATE, PROFILE
28-Oct-2012 Advancement Flap (Single) Text: The defect edges were debeveled with a #15 scalpel blade.  Given the location of the defect and the proximity to free margins a single advancement flap was deemed most appropriate.  Using a sterile surgical marker, an appropriate advancement flap was drawn incorporating the defect and placing the expected incisions within the relaxed skin tension lines where possible.    The area thus outlined was incised deep to adipose tissue with a #15 scalpel blade.  The skin margins were undermined to an appropriate distance in all directions utilizing iris scissors.

## 2017-08-21 NOTE — ED PROVIDER NOTE - OBJECTIVE STATEMENT
62 y/o male with PMHx of CAD, Coronary Stent, DM, GERD, GI Bleed, MI, Hemorrhoids, HTN, HLD, and Coronary Angiogram presents to the ED c/o CP x5 days. Pt states the pain feels like pressure in his chest and L arm, which worsened and felt like burning today. Pt also notes associated symptoms of dizziness and SOB. Pt states he took NTG to no relief of symptoms. Pt denies fever, chills, nausea, vomiting, diaphoresis, leg swelling, or any other complaints. NKDA. PMD: Dr. Shanks

## 2017-08-21 NOTE — ED PROVIDER NOTE - PMH
CAD (Coronary Artery Disease)    Coronary Stent  from 1998 - 2011 (total 5 stents)  Diabetes Mellitus  with neuropathy  GERD (Gastroesophageal Reflux Disease)    H/O: GI Bleed  post colonoscopy in 2010, treated with blood transfusion.  Heart Attack  1998 with stent and 2001 with stent, stents again in approx 2006 and 2/08  Hemorrhoids    History of coronary angiogram  in July of 2011 with STENTS to RPDA & RCA  HTN    Hypercholesterolemia

## 2017-08-21 NOTE — DISCHARGE NOTE ADULT - CARE PROVIDER_API CALL
Ac Blue), Cardiology Medicine  [address] [place] [address]  Phone: [phone]  Fax: [phone]

## 2017-08-24 DIAGNOSIS — K64.9 UNSPECIFIED HEMORRHOIDS: ICD-10-CM

## 2017-08-24 DIAGNOSIS — E78.5 HYPERLIPIDEMIA, UNSPECIFIED: ICD-10-CM

## 2017-08-24 DIAGNOSIS — I25.10 ATHEROSCLEROTIC HEART DISEASE OF NATIVE CORONARY ARTERY WITHOUT ANGINA PECTORIS: ICD-10-CM

## 2017-08-24 DIAGNOSIS — Z87.19 PERSONAL HISTORY OF OTHER DISEASES OF THE DIGESTIVE SYSTEM: ICD-10-CM

## 2017-08-24 DIAGNOSIS — Z95.5 PRESENCE OF CORONARY ANGIOPLASTY IMPLANT AND GRAFT: ICD-10-CM

## 2017-08-24 DIAGNOSIS — E11.40 TYPE 2 DIABETES MELLITUS WITH DIABETIC NEUROPATHY, UNSPECIFIED: ICD-10-CM

## 2017-08-24 DIAGNOSIS — I25.2 OLD MYOCARDIAL INFARCTION: ICD-10-CM

## 2017-08-24 DIAGNOSIS — R07.9 CHEST PAIN, UNSPECIFIED: ICD-10-CM

## 2017-08-24 DIAGNOSIS — I10 ESSENTIAL (PRIMARY) HYPERTENSION: ICD-10-CM

## 2017-08-24 DIAGNOSIS — K21.9 GASTRO-ESOPHAGEAL REFLUX DISEASE WITHOUT ESOPHAGITIS: ICD-10-CM

## 2017-12-15 PROCEDURE — 82550 ASSAY OF CK (CPK): CPT

## 2017-12-15 PROCEDURE — 82553 CREATINE MB FRACTION: CPT

## 2017-12-15 PROCEDURE — 84484 ASSAY OF TROPONIN QUANT: CPT

## 2017-12-15 PROCEDURE — G0378: CPT

## 2017-12-15 PROCEDURE — 80048 BASIC METABOLIC PNL TOTAL CA: CPT

## 2017-12-15 PROCEDURE — 93005 ELECTROCARDIOGRAM TRACING: CPT

## 2017-12-15 PROCEDURE — 99285 EMERGENCY DEPT VISIT HI MDM: CPT | Mod: 25

## 2017-12-15 PROCEDURE — 96374 THER/PROPH/DIAG INJ IV PUSH: CPT

## 2017-12-15 PROCEDURE — 85027 COMPLETE CBC AUTOMATED: CPT

## 2017-12-15 PROCEDURE — 71046 X-RAY EXAM CHEST 2 VIEWS: CPT

## 2018-01-03 ENCOUNTER — TRANSCRIPTION ENCOUNTER (OUTPATIENT)
Age: 63
End: 2018-01-03

## 2018-02-26 ENCOUNTER — OUTPATIENT (OUTPATIENT)
Dept: OUTPATIENT SERVICES | Facility: HOSPITAL | Age: 63
LOS: 1 days | End: 2018-02-26
Payer: MEDICAID

## 2018-02-26 DIAGNOSIS — I10 ESSENTIAL (PRIMARY) HYPERTENSION: Chronic | ICD-10-CM

## 2018-02-26 DIAGNOSIS — R07.9 CHEST PAIN, UNSPECIFIED: ICD-10-CM

## 2018-02-26 DIAGNOSIS — Z95.5 PRESENCE OF CORONARY ANGIOPLASTY IMPLANT AND GRAFT: Chronic | ICD-10-CM

## 2018-02-26 PROCEDURE — A9502: CPT

## 2018-02-26 PROCEDURE — 93017 CV STRESS TEST TRACING ONLY: CPT

## 2018-02-26 PROCEDURE — 78452 HT MUSCLE IMAGE SPECT MULT: CPT

## 2018-03-05 ENCOUNTER — OUTPATIENT (OUTPATIENT)
Dept: OUTPATIENT SERVICES | Facility: HOSPITAL | Age: 63
LOS: 1 days | End: 2018-03-05
Payer: MEDICAID

## 2018-03-05 VITALS
WEIGHT: 274.92 LBS | DIASTOLIC BLOOD PRESSURE: 105 MMHG | RESPIRATION RATE: 16 BRPM | HEART RATE: 68 BPM | SYSTOLIC BLOOD PRESSURE: 155 MMHG | TEMPERATURE: 98 F | OXYGEN SATURATION: 98 % | HEIGHT: 71 IN

## 2018-03-05 DIAGNOSIS — Z95.5 PRESENCE OF CORONARY ANGIOPLASTY IMPLANT AND GRAFT: Chronic | ICD-10-CM

## 2018-03-05 DIAGNOSIS — R93.1 ABNORMAL FINDINGS ON DIAGNOSTIC IMAGING OF HEART AND CORONARY CIRCULATION: ICD-10-CM

## 2018-03-05 DIAGNOSIS — I10 ESSENTIAL (PRIMARY) HYPERTENSION: Chronic | ICD-10-CM

## 2018-03-05 LAB
ALBUMIN SERPL ELPH-MCNC: 4.5 G/DL — SIGNIFICANT CHANGE UP (ref 3.3–5)
ALP SERPL-CCNC: 47 U/L — SIGNIFICANT CHANGE UP (ref 40–120)
ALT FLD-CCNC: 35 U/L RC — SIGNIFICANT CHANGE UP (ref 10–45)
ANION GAP SERPL CALC-SCNC: 13 MMOL/L — SIGNIFICANT CHANGE UP (ref 5–17)
AST SERPL-CCNC: 23 U/L — SIGNIFICANT CHANGE UP (ref 10–40)
BILIRUB SERPL-MCNC: 0.3 MG/DL — SIGNIFICANT CHANGE UP (ref 0.2–1.2)
BUN SERPL-MCNC: 13 MG/DL — SIGNIFICANT CHANGE UP (ref 7–23)
CALCIUM SERPL-MCNC: 9.5 MG/DL — SIGNIFICANT CHANGE UP (ref 8.4–10.5)
CHLORIDE SERPL-SCNC: 101 MMOL/L — SIGNIFICANT CHANGE UP (ref 96–108)
CO2 SERPL-SCNC: 26 MMOL/L — SIGNIFICANT CHANGE UP (ref 22–31)
CREAT SERPL-MCNC: 0.77 MG/DL — SIGNIFICANT CHANGE UP (ref 0.5–1.3)
GLUCOSE BLDC GLUCOMTR-MCNC: 135 MG/DL — HIGH (ref 70–99)
GLUCOSE BLDC GLUCOMTR-MCNC: 158 MG/DL — HIGH (ref 70–99)
GLUCOSE BLDC GLUCOMTR-MCNC: 178 MG/DL — HIGH (ref 70–99)
GLUCOSE SERPL-MCNC: 212 MG/DL — HIGH (ref 70–99)
HCT VFR BLD CALC: 39.7 % — SIGNIFICANT CHANGE UP (ref 39–50)
HGB BLD-MCNC: 13.5 G/DL — SIGNIFICANT CHANGE UP (ref 13–17)
MCHC RBC-ENTMCNC: 30.7 PG — SIGNIFICANT CHANGE UP (ref 27–34)
MCHC RBC-ENTMCNC: 34 GM/DL — SIGNIFICANT CHANGE UP (ref 32–36)
MCV RBC AUTO: 90.5 FL — SIGNIFICANT CHANGE UP (ref 80–100)
PLATELET # BLD AUTO: 180 K/UL — SIGNIFICANT CHANGE UP (ref 150–400)
POTASSIUM SERPL-MCNC: 4.1 MMOL/L — SIGNIFICANT CHANGE UP (ref 3.5–5.3)
POTASSIUM SERPL-SCNC: 4.1 MMOL/L — SIGNIFICANT CHANGE UP (ref 3.5–5.3)
PROT SERPL-MCNC: 7.6 G/DL — SIGNIFICANT CHANGE UP (ref 6–8.3)
RBC # BLD: 4.39 M/UL — SIGNIFICANT CHANGE UP (ref 4.2–5.8)
RBC # FLD: 12.5 % — SIGNIFICANT CHANGE UP (ref 10.3–14.5)
SODIUM SERPL-SCNC: 140 MMOL/L — SIGNIFICANT CHANGE UP (ref 135–145)
WBC # BLD: 4 K/UL — SIGNIFICANT CHANGE UP (ref 3.8–10.5)
WBC # FLD AUTO: 4 K/UL — SIGNIFICANT CHANGE UP (ref 3.8–10.5)

## 2018-03-05 PROCEDURE — 85027 COMPLETE CBC AUTOMATED: CPT

## 2018-03-05 PROCEDURE — C1887: CPT

## 2018-03-05 PROCEDURE — C1769: CPT

## 2018-03-05 PROCEDURE — 93010 ELECTROCARDIOGRAM REPORT: CPT

## 2018-03-05 PROCEDURE — 99205 OFFICE O/P NEW HI 60 MIN: CPT

## 2018-03-05 PROCEDURE — 82962 GLUCOSE BLOOD TEST: CPT

## 2018-03-05 PROCEDURE — 93005 ELECTROCARDIOGRAM TRACING: CPT

## 2018-03-05 PROCEDURE — C1894: CPT

## 2018-03-05 PROCEDURE — 99152 MOD SED SAME PHYS/QHP 5/>YRS: CPT

## 2018-03-05 PROCEDURE — 93454 CORONARY ARTERY ANGIO S&I: CPT | Mod: 26

## 2018-03-05 PROCEDURE — 80053 COMPREHEN METABOLIC PANEL: CPT

## 2018-03-05 PROCEDURE — 93454 CORONARY ARTERY ANGIO S&I: CPT

## 2018-03-05 RX ORDER — CLONAZEPAM 1 MG
1 TABLET ORAL
Qty: 0 | Refills: 0 | COMMUNITY

## 2018-03-05 NOTE — H&P CARDIOLOGY - HISTORY OF PRESENT ILLNESS
61 y/o [place] Male with PMhx of CAD, s/p 7 stents, DM2 ( on insulin pump, A 1c: 7.5 ), HTN, HLD, presents today for coronary angiogram. Patient states he has chest pain, 5-6/10, left sided, radiating to left arm, not related to any activity. Patient had a nuclear stress test which shows there is a large defect in the inferior wall that is fixed consistent with MI. There is small severe defect in infero lateral wall that is fixed consistent with MI. Akinetic inferior and infero- lateral wall with  EF is 31%. Seen & evaluated by cardiologist, Dr. Blue and recommends for cardiac cath. Denies palpitation dizziness/ syncope. 63 y/o Ethiopian Male with PMhx of CAD, s/p 7 stents, DM2 ( on insulin pump, A 1c: 7.5 ), HTN, HLD, presents today for coronary angiogram. Patient states he has chest pain, 5-6/10, left sided, radiating to left arm, not related to any activity. Patient had a nuclear stress test which shows there is a large defect in the inferior wall that is fixed consistent with MI. There is small severe defect in infero lateral wall that is fixed consistent with MI. Akinetic inferior and infero- lateral wall with  EF is 31%. Seen & evaluated by cardiologist, Dr. Blue and recommends for cardiac cath. Denies palpitation dizziness/ syncope.   Endo: Dr. Xiao So.

## 2018-03-05 NOTE — H&P CARDIOLOGY - PMH
CAD (Coronary Artery Disease)    Coronary Stent  from 1998 - 2011 (total 5 stents)  Diabetes Mellitus  with neuropathy  GERD (Gastroesophageal Reflux Disease)    H/O: GI Bleed  post colonoscopy in 2010, treated with blood transfusion.  Heart Attack  1998 with stent and 2001 with stent, stents again in approx 2006 and 2/08  Hemorrhoids    History of coronary angiogram  in July of 2011 with STENTS to RPDA & RCA  HTN    Hypercholesterolemia CAD (Coronary Artery Disease)    Coronary Stent  from 1998 - 2016 (total 7 stents)  Diabetes Mellitus  with neuropathy  GERD (Gastroesophageal Reflux Disease)    H/O: GI Bleed  post colonoscopy in 2010, treated with blood transfusion.  Heart Attack  1998 with stent and 2001 with stent, stents again in approx 2006 and 2/08  Hemorrhoids    History of coronary angiogram  in July of 2011 with STENTS to RPDA & RCA  HTN    Hypercholesterolemia

## 2018-11-01 ENCOUNTER — OUTPATIENT (OUTPATIENT)
Dept: OUTPATIENT SERVICES | Facility: HOSPITAL | Age: 63
LOS: 1 days | End: 2018-11-01
Payer: MEDICAID

## 2018-11-01 DIAGNOSIS — Z95.5 PRESENCE OF CORONARY ANGIOPLASTY IMPLANT AND GRAFT: Chronic | ICD-10-CM

## 2018-11-01 PROCEDURE — G9001: CPT

## 2018-11-26 ENCOUNTER — INPATIENT (INPATIENT)
Facility: HOSPITAL | Age: 63
LOS: 0 days | Discharge: HOME | DRG: 287 | End: 2018-11-26
Attending: INTERNAL MEDICINE | Admitting: INTERNAL MEDICINE
Payer: MEDICAID

## 2018-11-26 ENCOUNTER — TRANSCRIPTION ENCOUNTER (OUTPATIENT)
Age: 63
End: 2018-11-26

## 2018-11-26 VITALS
SYSTOLIC BLOOD PRESSURE: 121 MMHG | DIASTOLIC BLOOD PRESSURE: 74 MMHG | OXYGEN SATURATION: 96 % | HEART RATE: 79 BPM | RESPIRATION RATE: 18 BRPM

## 2018-11-26 VITALS — WEIGHT: 270.07 LBS

## 2018-11-26 DIAGNOSIS — Z95.5 PRESENCE OF CORONARY ANGIOPLASTY IMPLANT AND GRAFT: Chronic | ICD-10-CM

## 2018-11-26 DIAGNOSIS — R07.9 CHEST PAIN, UNSPECIFIED: ICD-10-CM

## 2018-11-26 LAB
ALBUMIN SERPL ELPH-MCNC: 4.8 G/DL — SIGNIFICANT CHANGE UP (ref 3.3–5)
ALP SERPL-CCNC: 43 U/L — SIGNIFICANT CHANGE UP (ref 40–120)
ALT FLD-CCNC: 32 U/L — SIGNIFICANT CHANGE UP (ref 10–45)
ANION GAP SERPL CALC-SCNC: 14 MMOL/L — SIGNIFICANT CHANGE UP (ref 5–17)
APTT BLD: 33.9 SEC — SIGNIFICANT CHANGE UP (ref 27.5–36.3)
AST SERPL-CCNC: 24 U/L — SIGNIFICANT CHANGE UP (ref 10–40)
BILIRUB SERPL-MCNC: 0.6 MG/DL — SIGNIFICANT CHANGE UP (ref 0.2–1.2)
BUN SERPL-MCNC: 13 MG/DL — SIGNIFICANT CHANGE UP (ref 7–23)
CALCIUM SERPL-MCNC: 10 MG/DL — SIGNIFICANT CHANGE UP (ref 8.4–10.5)
CHLORIDE SERPL-SCNC: 99 MMOL/L — SIGNIFICANT CHANGE UP (ref 96–108)
CO2 SERPL-SCNC: 23 MMOL/L — SIGNIFICANT CHANGE UP (ref 22–31)
CREAT SERPL-MCNC: 0.86 MG/DL — SIGNIFICANT CHANGE UP (ref 0.5–1.3)
GLUCOSE BLDC GLUCOMTR-MCNC: 113 MG/DL — HIGH (ref 70–99)
GLUCOSE BLDC GLUCOMTR-MCNC: 116 MG/DL — HIGH (ref 70–99)
GLUCOSE BLDC GLUCOMTR-MCNC: 124 MG/DL — HIGH (ref 70–99)
GLUCOSE SERPL-MCNC: 143 MG/DL — HIGH (ref 70–99)
HCT VFR BLD CALC: 44.1 % — SIGNIFICANT CHANGE UP (ref 39–50)
HGB BLD-MCNC: 15.7 G/DL — SIGNIFICANT CHANGE UP (ref 13–17)
INR BLD: 1.01 RATIO — SIGNIFICANT CHANGE UP (ref 0.88–1.16)
MCHC RBC-ENTMCNC: 31.2 PG — SIGNIFICANT CHANGE UP (ref 27–34)
MCHC RBC-ENTMCNC: 35.5 GM/DL — SIGNIFICANT CHANGE UP (ref 32–36)
MCV RBC AUTO: 88.1 FL — SIGNIFICANT CHANGE UP (ref 80–100)
NT-PROBNP SERPL-SCNC: 21 PG/ML — SIGNIFICANT CHANGE UP (ref 0–300)
PLATELET # BLD AUTO: 181 K/UL — SIGNIFICANT CHANGE UP (ref 150–400)
POTASSIUM SERPL-MCNC: 4 MMOL/L — SIGNIFICANT CHANGE UP (ref 3.5–5.3)
POTASSIUM SERPL-SCNC: 4 MMOL/L — SIGNIFICANT CHANGE UP (ref 3.5–5.3)
PROT SERPL-MCNC: 7.6 G/DL — SIGNIFICANT CHANGE UP (ref 6–8.3)
PROTHROM AB SERPL-ACNC: 11.6 SEC — SIGNIFICANT CHANGE UP (ref 10–12.9)
RBC # BLD: 5.01 M/UL — SIGNIFICANT CHANGE UP (ref 4.2–5.8)
RBC # FLD: 12.9 % — SIGNIFICANT CHANGE UP (ref 10.3–14.5)
SODIUM SERPL-SCNC: 136 MMOL/L — SIGNIFICANT CHANGE UP (ref 135–145)
TROPONIN T, HIGH SENSITIVITY RESULT: 14 NG/L — SIGNIFICANT CHANGE UP (ref 0–51)
TROPONIN T, HIGH SENSITIVITY RESULT: 14 NG/L — SIGNIFICANT CHANGE UP (ref 0–51)
WBC # BLD: 6 K/UL — SIGNIFICANT CHANGE UP (ref 3.8–10.5)
WBC # FLD AUTO: 6 K/UL — SIGNIFICANT CHANGE UP (ref 3.8–10.5)

## 2018-11-26 PROCEDURE — 93458 L HRT ARTERY/VENTRICLE ANGIO: CPT | Mod: 26,GC

## 2018-11-26 PROCEDURE — 71046 X-RAY EXAM CHEST 2 VIEWS: CPT | Mod: 26

## 2018-11-26 PROCEDURE — 99285 EMERGENCY DEPT VISIT HI MDM: CPT | Mod: 25

## 2018-11-26 PROCEDURE — 99223 1ST HOSP IP/OBS HIGH 75: CPT

## 2018-11-26 PROCEDURE — 99152 MOD SED SAME PHYS/QHP 5/>YRS: CPT | Mod: GC

## 2018-11-26 PROCEDURE — 76937 US GUIDE VASCULAR ACCESS: CPT | Mod: 26,GC

## 2018-11-26 PROCEDURE — 93010 ELECTROCARDIOGRAM REPORT: CPT

## 2018-11-26 RX ORDER — ASPIRIN/CALCIUM CARB/MAGNESIUM 324 MG
324 TABLET ORAL DAILY
Qty: 0 | Refills: 0 | Status: DISCONTINUED | OUTPATIENT
Start: 2018-11-26 | End: 2018-11-26

## 2018-11-26 RX ORDER — AMLODIPINE BESYLATE 2.5 MG/1
1 TABLET ORAL
Qty: 0 | Refills: 0 | COMMUNITY

## 2018-11-26 RX ORDER — METFORMIN HYDROCHLORIDE 850 MG/1
1 TABLET ORAL
Qty: 0 | Refills: 0 | COMMUNITY

## 2018-11-26 RX ADMIN — Medication 324 MILLIGRAM(S): at 10:56

## 2018-11-26 NOTE — DISCHARGE NOTE ADULT - MEDICATION SUMMARY - MEDICATIONS TO TAKE
I will START or STAY ON the medications listed below when I get home from the hospital:    aspirin 325 mg oral delayed release tablet  -- 1 tab(s) by mouth once a day  -- Indication: For Chest pain    Flomax 0.4 mg oral capsule  -- 1 cap(s) by mouth once a day  -- Indication: For enlarged prostate     nitroglycerin 0.4 mg sublingual tablet  -- 1 tab(s) under tongue every 5 minutes, As Needed  -- Indication: For Chest pain    Ranexa 500 mg oral tablet, extended release  -- 1 tab(s) by mouth 2 times a day  -- Indication: For Chest pain     Lexapro 20 mg oral tablet  -- 1 tab(s) by mouth once a day  -- Indication: For anti depressant     Janumet 50 mg-1000 mg oral tablet  -- 1 tab(s) by mouth 2 times a day  -- Indication: For diabetes    insulin  -- Insulin pump with Humalog ( mohchi)  -- Indication: For diabetes    metFORMIN 500 mg oral tablet, extended release  -- 1 tab(s) by mouth once a day  restart ib 11/29  -- Indication: For diabetes    Crestor  -- 40 milligram(s) by mouth once a day  -- Indication: For high cholesterol     niacin 500 mg oral capsule, extended release  -- 1 cap(s) by mouth once a day (at bedtime)  -- Indication: For high cholesterol     gemfibrozil 600 mg oral tablet  -- 1 tab(s) by mouth 2 times a day  -- Indication: For high cholesterol     clopidogrel 75 mg oral tablet  -- 1 tab(s) by mouth once a day  -- Indication: For Chest pain    Ambien 10 mg oral tablet  -- 1 tab(s) by mouth once a day (at bedtime), As Needed insomnia  -- Indication: For insomnia     metoprolol succinate 200 mg oral tablet, extended release  -- 1 tab(s) by mouth once a day  -- Indication: For high blood pressure    Protonix 40 mg oral delayed release tablet  -- 1 tab(s) by mouth once a day  -- Indication: For acid reflux     Drisdol 50,000 intl units oral capsule  -- 1 cap(s) by mouth once a week Monday  -- Indication: For supplement

## 2018-11-26 NOTE — DISCHARGE NOTE ADULT - PATIENT PORTAL LINK FT
You can access the [facility] Patient Portal, offered by [facility], by registering with the following website: [email or web address]

## 2018-11-26 NOTE — ED ADULT NURSE NOTE - OBJECTIVE STATEMENT
63 yr old male to ed via wheelchair, accomp by family, c/o substernal "burning" chest pain rad to l arm intermittently x 5-6 days.. Pt has h/o CAD (7 stents), DM with insulin pump. Pain at 5/10, : worse w exertion over the past 4 days.  Pt denies any fevers, chills cough. C/o  mild SOB w exertion.  Denies any recent travel.  Had cardiac cath in March 2018. No identifiable lesions for stenting. Made comfortable.

## 2018-11-26 NOTE — ED ADULT NURSE NOTE - NSIMPLEMENTINTERV_GEN_ALL_ED
Implemented All Universal Safety Interventions:  [employer] to call system. Call bell, personal items and telephone within reach. Instruct patient to call for assistance. Room bathroom lighting operational. Non-slip footwear when patient is off stretcher. Physically safe environment: no spills, clutter or unnecessary equipment. Stretcher in lowest position, wheels locked, appropriate side rails in place.

## 2018-11-26 NOTE — ED PROVIDER NOTE - PMH
CAD (Coronary Artery Disease)    Coronary Stent  from 1998 - 2016 (total 7 stents)  Diabetes Mellitus  with neuropathy  GERD (Gastroesophageal Reflux Disease)    H/O: GI Bleed  post colonoscopy in 2010, treated with blood transfusion.  Heart Attack  1998 with stent and 2001 with stent, stents again in approx 2006 and 2/08  Hemorrhoids    History of coronary angiogram  in July of 2011 with STENTS to RPDA & RCA  HTN    Hypercholesterolemia

## 2018-11-26 NOTE — DISCHARGE NOTE ADULT - CARE PLAN
Principal Discharge DX:	CAD (coronary artery disease)  Goal:	Pt remains chest pain free and understands post cath discharge instructions  Assessment and plan of treatment:	Low salt, low fat diet.   Weight management.   Take medications as prescribed.    No smoking.  Follow up appointments with your doctor(s)  as instruced.    No heavy lifting for 2 weeks, no strenuous activity  or uneccesary stair climbing, no driving for x 2 days,  you may shower 24 hours following procedure but no bathing or swimming for x1  week, no bending, no straining while having a Bowel movement, No strenuous sexual activity for x 1 week check groin for bleeding, pain, tightness or ( golf ball size)  swelling daily following procedure , & follow up with your cardiologist in 1-2 week  Secondary Diagnosis:	Diabetes mellitus  Goal:	Your hemoglobin A1C will be at a normal range (normal range is from 6-8)  Assessment and plan of treatment:	Continue to follow with your primary care MD or your endocrinologist. Discuss what the goal hemoglobin A1C level is for you.  Follow a heart healthy diabetic diet. If you check your fingerstick glucose at home, call your MD if it is greater than 250mg/dL on 2 occasions or less than 100mg/dL on 2 occasions. Know signs of low blood sugar, such as: dizziness, shakiness, sweating, confusion, hunger, nervousness- drink 4 ounces apple juice if occurs and call your doctor. Know early signs of high blood sugar, such as: frequent urination, increased thirst, blurry vision, fatigue, headache - call your doctor if this occurs.  Secondary Diagnosis:	HTN (hypertension)  Goal:	Your blood pressure will be controlled.  Assessment and plan of treatment:	Continue with your blood pressure medications; eat a heart healthy diet with low salt diet; exercise regularly (consult with your physician or cardiologist first); maintain a heart healthy weight; if you smoke - quit (A resource to help you stop smoking is the [facility] Tobacco [facility] – phone number [phone].); include healthy ways to manage stress. Continue to follow with your primary care physician or cardiologist.  Secondary Diagnosis:	HLD (hyperlipidemia)  Goal:	Your LDL cholesterol will be less than 70mg/dL  Assessment and plan of treatment:	Continue with your cholesterol medications. Eat a heart healthy diet that is low in saturated fats and salt, and includes whole grains, fruits, vegetables and lean protein; exercise regularly (consult with your physician or cardiologist first); maintain a heart healthy weight; if you smoke - quit (A resource to help you stop smoking is the [facility] – phone number [phone].). Continue to follow with your primary physician or cardiologist.  Secondary Diagnosis:	GERD (gastroesophageal reflux disease)  Goal:	Will remain free of acid reflux episodes  Assessment and plan of treatment:	Continue current antiacid medication. Avoid foods that potentially exacerbate GERD s&s ,such as: spicy foods, citrus fruits

## 2018-11-26 NOTE — ED PROVIDER NOTE - MEDICAL DECISION MAKING DETAILS
Em:  chest pain r/o ACS work up, cardiology consult, labs, xrays, high risk for ACS although recent negative cath with same sx unclear.

## 2018-11-26 NOTE — DISCHARGE NOTE ADULT - HOSPITAL COURSE
63 y/o [place] Male with PMHx of MI, CAD with multiple stents, DMT2 (on insulin pump, [employer] A1C: 7.5, managed by Dr. Xiao So. ), HTN, HLD, GERD presented to the ER today c/o 4 to 5 days of intermittent 5/10 substernal chest pain with SOB radiated to left arm worse with exertion. Patient reports chest pain last night, unable to sleep, took nitro SL x3 last night without pain relief. Chest pain increasing in intensity and frequency, came to ED. Patient here today for cardiac cath for further ischemic workup    Endo: Dr. Johnson notified about insulin pump    Last Cath in 3/2018 showed as below. (03.05.18 @ 11:12) >  CORONARY VESSELS: The coronary circulation is right dominant.   LM:   --  LM: Angiography showed minor luminal irregularities with no flow limiting lesions.  LAD:   --  Proximal LAD: There was a 20 % stenosis at the site of a prior stent.  CX:   --  Circumflex: Angiography showed minor luminal irregularities with no flow limiting lesions.  RCA:   --  Distal RCA: There was a 20 % stenosis at the site of a prior stent.  < end of copied text >          Cardiologist, Dr. Blue and recommends for cardiac cath. Denies palpitation dizziness/ syncope.   Endo: Dr. Xiao So.

## 2018-11-26 NOTE — ED PROVIDER NOTE - PHYSICAL EXAMINATION
Gen:  alert, awake, no acute distress, obese  HEENT:  atraumatic head, airway clear, pupils equal and round  CV:  rrr, nl S1, S2, no m/r/g  Pulm:  lungs CTA b/l  Abd: s/nt/nd, +BS  Ext:  moving all extremities  Neuro:  grossly intact, no focal deficits  Skin:  clear, dry, intact  Psych: AOx3, normal affect, no apparent risk to self or others

## 2018-11-26 NOTE — DISCHARGE NOTE ADULT - CARE PROVIDER_API CALL
Ac Blue), Cardiology Medicine  [address] [place] [address]  Phone: [phone]  Fax: [phone]

## 2018-11-26 NOTE — H&P CARDIOLOGY - FAMILY HISTORY
Family history of diabetes mellitus     Grandparent  Still living? Unknown  Family history of cardiac disorder, Age at diagnosis: Age Unknown

## 2018-11-26 NOTE — ED PROVIDER NOTE - OBJECTIVE STATEMENT
pt with CAD (7 stents), DM with insulin pump, reports sub sternal chest pain 5/10, intermittent, radiates to L arm and worse w exertion over the past 4 days.  pt denies any fevers, cough, some mild SOB w exertion.  denies any recent travel.  had cardiac cath in March 2018 with no identifiable lesions for stenting.

## 2018-11-26 NOTE — DISCHARGE NOTE ADULT - PLAN OF CARE
Pt remains chest pain free and understands post cath discharge instructions Low salt, low fat diet.   Weight management.   Take medications as prescribed.    No smoking.  Follow up appointments with your doctor(s)  as instruced.    No heavy lifting for 2 weeks, no strenuous activity  or uneccesary stair climbing, no driving for x 2 days,  you may shower 24 hours following procedure but no bathing or swimming for x1  week, no bending, no straining while having a Bowel movement, No strenuous sexual activity for x 1 week check groin for bleeding, pain, tightness or ( golf ball size)  swelling daily following procedure , & follow up with your cardiologist in 1-2 week Your hemoglobin A1C will be at a normal range (normal range is from 6-8) Continue to follow with your primary care MD or your endocrinologist. Discuss what the goal hemoglobin A1C level is for you.  Follow a heart healthy diabetic diet. If you check your fingerstick glucose at home, call your MD if it is greater than 250mg/dL on 2 occasions or less than 100mg/dL on 2 occasions. Know signs of low blood sugar, such as: dizziness, shakiness, sweating, confusion, hunger, nervousness- drink 4 ounces apple juice if occurs and call your doctor. Know early signs of high blood sugar, such as: frequent urination, increased thirst, blurry vision, fatigue, headache - call your doctor if this occurs. Your blood pressure will be controlled. Continue with your blood pressure medications; eat a heart healthy diet with low salt diet; exercise regularly (consult with your physician or cardiologist first); maintain a heart healthy weight; if you smoke - quit (A resource to help you stop smoking is the [facility] – phone number [phone].); include healthy ways to manage stress. Continue to follow with your primary care physician or cardiologist. Your LDL cholesterol will be less than 70mg/dL Continue with your cholesterol medications. Eat a heart healthy diet that is low in saturated fats and salt, and includes whole grains, fruits, vegetables and lean protein; exercise regularly (consult with your physician or cardiologist first); maintain a heart healthy weight; if you smoke - quit (A resource to help you stop smoking is the [facility] – phone number [phone].). Continue to follow with your primary physician or cardiologist. Will remain free of acid reflux episodes Continue current antiacid medication. Avoid foods that potentially exacerbate GERD s&s ,such as: spicy foods, citrus fruits

## 2018-11-26 NOTE — ED PROVIDER NOTE - PROGRESS NOTE DETAILS
Demetria MURO: spoke with pts cardiologist Dr Forte who would like pt admitted for cath cardiology at bedside

## 2018-11-26 NOTE — H&P CARDIOLOGY - HISTORY OF PRESENT ILLNESS
61 y/o German Male with PMHx of CAD, s/p 7 stents, DM2 (on insulin pump, A1C: 7.5 ), HTN, HLD, presented to ER today c/o 4 days of intermittent 5/10 substernal chest pain with SOB radiated to left arm worse with exertion. Troponin x 2 negative in ED,       Cardiologist, Dr. Blue and recommends for cardiac cath. Denies palpitation dizziness/ syncope.   Endo: Dr. Xiao So. 61 y/o Wallisian Male with PMHx of MI, CAD, s/p 7 stents, DM2 (on insulin pump, A1C: 7.5 ), HTN, HLD, GERD presented ER today c/o 4 days of intermittent 5/10 substernal chest pain with SOB radiated to left arm worse with exertion. Troponin x 2 negative in ED,       Last Cath in 3/2018 showed as below.   < from: Cardiac Cath Lab - Adult (03.05.18 @ 11:12) >  CORONARY VESSELS: The coronary circulation is right dominant.   LM:   --  LM: Angiography showed minor luminal irregularities with no flow limiting lesions.  LAD:   --  Proximal LAD: There was a 20 % stenosis at the site of a prior stent.  CX:   --  Circumflex: Angiography showed minor luminal irregularities with no flow limiting lesions.  RCA:   --  Distal RCA: There was a 20 % stenosis at the site of a prior stent.  < end of copied text >          Cardiologist, Dr. Blue and recommends for cardiac cath. Denies palpitation dizziness/ syncope.   Endo: Dr. Xiao So. 61 y/o [place]vian Male with PMHx of MI, CAD with multiple stents, DMT2 (on insulin pump, A1C: 7.5, managed by Dr. Xiao So. ), HTN, HLD, GERD presented to the ER today c/o 4 days of intermittent 5/10 substernal chest pain with SOB radiated to left arm worse with exertion. Troponin x 2 negative in ED       Last Cath in 3/2018 showed as below. (03.05.18 @ 11:12) >  CORONARY VESSELS: The coronary circulation is right dominant.   LM:   --  LM: Angiography showed minor luminal irregularities with no flow limiting lesions.  LAD:   --  Proximal LAD: There was a 20 % stenosis at the site of a prior stent.  CX:   --  Circumflex: Angiography showed minor luminal irregularities with no flow limiting lesions.  RCA:   --  Distal RCA: There was a 20 % stenosis at the site of a prior stent.  < end of copied text >          Cardiologist, Dr. Blue and recommends for cardiac cath. Denies palpitation dizziness/ syncope.   Endo: Dr. Xiao So. 61 y/o [place] Male with PMHx of MI, CAD with multiple stents, DMT2 (on insulin pump, A1C: 7.5, managed by Dr. Xiao So. ), HTN, HLD, GERD presented to the ER today c/o 4 to 5 days of intermittent 5/10 substernal chest pain with SOB radiated to left arm worse with exertion. Patient reports chest pain last night, unable to sleep, took nitro SL x3 last night without pain relief. Chest pain increasing in intensity and frequency      Last Cath in 3/2018 showed as below. (03.05.18 @ 11:12) >  CORONARY VESSELS: The coronary circulation is right dominant.   LM:   --  LM: Angiography showed minor luminal irregularities with no flow limiting lesions.  LAD:   --  Proximal LAD: There was a 20 % stenosis at the site of a prior stent.  CX:   --  Circumflex: Angiography showed minor luminal irregularities with no flow limiting lesions.  RCA:   --  Distal RCA: There was a 20 % stenosis at the site of a prior stent.  < end of copied text >          Cardiologist, Dr. Blue and recommends for cardiac cath. Denies palpitation dizziness/ syncope.   Endo: Dr. Xiao So. 61 y/o Taj[place] Male with PMHx of MI, CAD with multiple stents, DMT2 (on insulin pump, [employer] A1C: 7.5, managed by Dr. Xiao So. ), HTN, HLD, GERD presented to the ER today c/o 4 to 5 days of intermittent 5/10 substernal chest pain with SOB radiated to left arm worse with exertion. Patient reports chest pain last night, unable to sleep, took nitro SL x3 last night without pain relief. Chest pain increasing in intensity and frequency, came to ED    Endo: Dr. Johnson     Last Cath in 3/2018 showed as below. (03.05.18 @ 11:12) >  CORONARY VESSELS: The coronary circulation is right dominant.   LM:   --  LM: Angiography showed minor luminal irregularities with no flow limiting lesions.  LAD:   --  Proximal LAD: There was a 20 % stenosis at the site of a prior stent.  CX:   --  Circumflex: Angiography showed minor luminal irregularities with no flow limiting lesions.  RCA:   --  Distal RCA: There was a 20 % stenosis at the site of a prior stent.  < end of copied text >          Cardiologist, Dr. Blue and recommends for cardiac cath. Denies palpitation dizziness/ syncope.   Endo: Dr. Xiao So. 61 y/o [place] Male with PMHx of MI, CAD with multiple stents, DMT2 (on insulin pump, [employer] A1C: 7.5, managed by Dr. Xiao So. ), HTN, HLD, GERD presented to the ER today c/o 4 to 5 days of intermittent 5/10 substernal chest pain with SOB radiated to left arm worse with exertion. Patient reports chest pain last night, unable to sleep, took nitro SL x3 last night without pain relief. Chest pain increasing in intensity and frequency, came to ED. Patient here today for cardiac cath for further ischemic workup    Endo: Dr. Johnson notified about insulin pump    Last Cath in 3/2018 showed as below. (03.05.18 @ 11:12) >  CORONARY VESSELS: The coronary circulation is right dominant.   LM:   --  LM: Angiography showed minor luminal irregularities with no flow limiting lesions.  LAD:   --  Proximal LAD: There was a 20 % stenosis at the site of a prior stent.  CX:   --  Circumflex: Angiography showed minor luminal irregularities with no flow limiting lesions.  RCA:   --  Distal RCA: There was a 20 % stenosis at the site of a prior stent.  < end of copied text >          Cardiologist, Dr. Blue and recommends for cardiac cath. Denies palpitation dizziness/ syncope.   Endo: Dr. Xiao So.

## 2018-11-28 DIAGNOSIS — Z71.89 OTHER SPECIFIED COUNSELING: ICD-10-CM

## 2019-01-09 PROCEDURE — 76937 US GUIDE VASCULAR ACCESS: CPT

## 2019-01-09 PROCEDURE — 85610 PROTHROMBIN TIME: CPT

## 2019-01-09 PROCEDURE — 99152 MOD SED SAME PHYS/QHP 5/>YRS: CPT

## 2019-01-09 PROCEDURE — 85027 COMPLETE CBC AUTOMATED: CPT

## 2019-01-09 PROCEDURE — C1769: CPT

## 2019-01-09 PROCEDURE — 83880 ASSAY OF NATRIURETIC PEPTIDE: CPT

## 2019-01-09 PROCEDURE — 84484 ASSAY OF TROPONIN QUANT: CPT

## 2019-01-09 PROCEDURE — C1887: CPT

## 2019-01-09 PROCEDURE — 93458 L HRT ARTERY/VENTRICLE ANGIO: CPT

## 2019-01-09 PROCEDURE — 93005 ELECTROCARDIOGRAM TRACING: CPT

## 2019-01-09 PROCEDURE — 85730 THROMBOPLASTIN TIME PARTIAL: CPT

## 2019-01-09 PROCEDURE — 80053 COMPREHEN METABOLIC PANEL: CPT

## 2019-01-09 PROCEDURE — 71046 X-RAY EXAM CHEST 2 VIEWS: CPT

## 2019-01-09 PROCEDURE — 82962 GLUCOSE BLOOD TEST: CPT

## 2019-01-09 PROCEDURE — C1894: CPT

## 2019-01-09 PROCEDURE — 99285 EMERGENCY DEPT VISIT HI MDM: CPT | Mod: 25

## 2019-04-01 ENCOUNTER — OUTPATIENT (OUTPATIENT)
Dept: OUTPATIENT SERVICES | Facility: HOSPITAL | Age: 64
LOS: 1 days | End: 2019-04-01
Payer: MEDICAID

## 2019-04-01 DIAGNOSIS — Z95.5 PRESENCE OF CORONARY ANGIOPLASTY IMPLANT AND GRAFT: Chronic | ICD-10-CM

## 2019-04-01 PROCEDURE — G9001: CPT

## 2019-04-02 DIAGNOSIS — Z71.89 OTHER SPECIFIED COUNSELING: ICD-10-CM

## 2019-04-18 ENCOUNTER — TRANSCRIPTION ENCOUNTER (OUTPATIENT)
Age: 64
End: 2019-04-18

## 2020-03-04 ENCOUNTER — INPATIENT (INPATIENT)
Facility: HOSPITAL | Age: 65
LOS: 1 days | Discharge: HOME | DRG: 247 | End: 2020-03-06
Attending: HOSPITALIST | Admitting: INTERNAL MEDICINE
Payer: MEDICAID

## 2020-03-04 VITALS
WEIGHT: 210.1 LBS | RESPIRATION RATE: 18 BRPM | HEART RATE: 64 BPM | TEMPERATURE: 97 F | DIASTOLIC BLOOD PRESSURE: 78 MMHG | HEIGHT: 71 IN | SYSTOLIC BLOOD PRESSURE: 129 MMHG | OXYGEN SATURATION: 99 %

## 2020-03-04 DIAGNOSIS — I25.10 ATHEROSCLEROTIC HEART DISEASE OF NATIVE CORONARY ARTERY WITHOUT ANGINA PECTORIS: ICD-10-CM

## 2020-03-04 DIAGNOSIS — I21.4 NON-ST ELEVATION (NSTEMI) MYOCARDIAL INFARCTION: ICD-10-CM

## 2020-03-04 DIAGNOSIS — Z29.9 ENCOUNTER FOR PROPHYLACTIC MEASURES, UNSPECIFIED: ICD-10-CM

## 2020-03-04 DIAGNOSIS — F32.9 MAJOR DEPRESSIVE DISORDER, SINGLE EPISODE, UNSPECIFIED: ICD-10-CM

## 2020-03-04 DIAGNOSIS — K21.9 GASTRO-ESOPHAGEAL REFLUX DISEASE WITHOUT ESOPHAGITIS: ICD-10-CM

## 2020-03-04 DIAGNOSIS — I10 ESSENTIAL (PRIMARY) HYPERTENSION: ICD-10-CM

## 2020-03-04 DIAGNOSIS — E11.9 TYPE 2 DIABETES MELLITUS WITHOUT COMPLICATIONS: ICD-10-CM

## 2020-03-04 DIAGNOSIS — Z95.5 PRESENCE OF CORONARY ANGIOPLASTY IMPLANT AND GRAFT: Chronic | ICD-10-CM

## 2020-03-04 DIAGNOSIS — R33.9 RETENTION OF URINE, UNSPECIFIED: ICD-10-CM

## 2020-03-04 DIAGNOSIS — I20.0 UNSTABLE ANGINA: ICD-10-CM

## 2020-03-04 DIAGNOSIS — Z02.9 ENCOUNTER FOR ADMINISTRATIVE EXAMINATIONS, UNSPECIFIED: ICD-10-CM

## 2020-03-04 LAB
ALBUMIN SERPL ELPH-MCNC: 4.5 G/DL — SIGNIFICANT CHANGE UP (ref 3.3–5)
ALP SERPL-CCNC: 74 U/L — SIGNIFICANT CHANGE UP (ref 40–120)
ALT FLD-CCNC: 23 U/L — SIGNIFICANT CHANGE UP (ref 10–45)
ANION GAP SERPL CALC-SCNC: 13 MMOL/L — SIGNIFICANT CHANGE UP (ref 5–17)
AST SERPL-CCNC: 18 U/L — SIGNIFICANT CHANGE UP (ref 10–40)
BASOPHILS # BLD AUTO: 0.03 K/UL — SIGNIFICANT CHANGE UP (ref 0–0.2)
BASOPHILS NFR BLD AUTO: 0.6 % — SIGNIFICANT CHANGE UP (ref 0–2)
BILIRUB SERPL-MCNC: 0.5 MG/DL — SIGNIFICANT CHANGE UP (ref 0.2–1.2)
BUN SERPL-MCNC: 9 MG/DL — SIGNIFICANT CHANGE UP (ref 7–23)
CALCIUM SERPL-MCNC: 9.8 MG/DL — SIGNIFICANT CHANGE UP (ref 8.4–10.5)
CHLORIDE SERPL-SCNC: 102 MMOL/L — SIGNIFICANT CHANGE UP (ref 96–108)
CO2 SERPL-SCNC: 26 MMOL/L — SIGNIFICANT CHANGE UP (ref 22–31)
CREAT SERPL-MCNC: 0.72 MG/DL — SIGNIFICANT CHANGE UP (ref 0.5–1.3)
EOSINOPHIL # BLD AUTO: 0.13 K/UL — SIGNIFICANT CHANGE UP (ref 0–0.5)
EOSINOPHIL NFR BLD AUTO: 2.8 % — SIGNIFICANT CHANGE UP (ref 0–6)
GLUCOSE BLDC GLUCOMTR-MCNC: 78 MG/DL — SIGNIFICANT CHANGE UP (ref 70–99)
GLUCOSE BLDC GLUCOMTR-MCNC: 91 MG/DL — SIGNIFICANT CHANGE UP (ref 70–99)
GLUCOSE SERPL-MCNC: 114 MG/DL — HIGH (ref 70–99)
HCT VFR BLD CALC: 42.3 % — SIGNIFICANT CHANGE UP (ref 39–50)
HGB BLD-MCNC: 14 G/DL — SIGNIFICANT CHANGE UP (ref 13–17)
IMM GRANULOCYTES NFR BLD AUTO: 0.2 % — SIGNIFICANT CHANGE UP (ref 0–1.5)
LYMPHOCYTES # BLD AUTO: 1.58 K/UL — SIGNIFICANT CHANGE UP (ref 1–3.3)
LYMPHOCYTES # BLD AUTO: 34.1 % — SIGNIFICANT CHANGE UP (ref 13–44)
MAGNESIUM SERPL-MCNC: 1.7 MG/DL — SIGNIFICANT CHANGE UP (ref 1.6–2.6)
MCHC RBC-ENTMCNC: 29.9 PG — SIGNIFICANT CHANGE UP (ref 27–34)
MCHC RBC-ENTMCNC: 33.1 GM/DL — SIGNIFICANT CHANGE UP (ref 32–36)
MCV RBC AUTO: 90.4 FL — SIGNIFICANT CHANGE UP (ref 80–100)
MONOCYTES # BLD AUTO: 0.4 K/UL — SIGNIFICANT CHANGE UP (ref 0–0.9)
MONOCYTES NFR BLD AUTO: 8.6 % — SIGNIFICANT CHANGE UP (ref 2–14)
NEUTROPHILS # BLD AUTO: 2.49 K/UL — SIGNIFICANT CHANGE UP (ref 1.8–7.4)
NEUTROPHILS NFR BLD AUTO: 53.7 % — SIGNIFICANT CHANGE UP (ref 43–77)
NRBC # BLD: 0 /100 WBCS — SIGNIFICANT CHANGE UP (ref 0–0)
NT-PROBNP SERPL-SCNC: 129 PG/ML — SIGNIFICANT CHANGE UP (ref 0–300)
PLATELET # BLD AUTO: 178 K/UL — SIGNIFICANT CHANGE UP (ref 150–400)
POTASSIUM SERPL-MCNC: 3.9 MMOL/L — SIGNIFICANT CHANGE UP (ref 3.5–5.3)
POTASSIUM SERPL-SCNC: 3.9 MMOL/L — SIGNIFICANT CHANGE UP (ref 3.5–5.3)
PROT SERPL-MCNC: 7.2 G/DL — SIGNIFICANT CHANGE UP (ref 6–8.3)
RBC # BLD: 4.68 M/UL — SIGNIFICANT CHANGE UP (ref 4.2–5.8)
RBC # FLD: 12.3 % — SIGNIFICANT CHANGE UP (ref 10.3–14.5)
SODIUM SERPL-SCNC: 141 MMOL/L — SIGNIFICANT CHANGE UP (ref 135–145)
TROPONIN T, HIGH SENSITIVITY RESULT: 11 NG/L — SIGNIFICANT CHANGE UP (ref 0–51)
TROPONIN T, HIGH SENSITIVITY RESULT: 9 NG/L — SIGNIFICANT CHANGE UP (ref 0–51)
WBC # BLD: 4.64 K/UL — SIGNIFICANT CHANGE UP (ref 3.8–10.5)
WBC # FLD AUTO: 4.64 K/UL — SIGNIFICANT CHANGE UP (ref 3.8–10.5)

## 2020-03-04 PROCEDURE — 93571 IV DOP VEL&/PRESS C FLO 1ST: CPT | Mod: 26,LD

## 2020-03-04 PROCEDURE — 92920 PRQ TRLUML C ANGIOP 1ART&/BR: CPT | Mod: LD

## 2020-03-04 PROCEDURE — 99285 EMERGENCY DEPT VISIT HI MDM: CPT

## 2020-03-04 PROCEDURE — 93010 ELECTROCARDIOGRAM REPORT: CPT

## 2020-03-04 PROCEDURE — 99152 MOD SED SAME PHYS/QHP 5/>YRS: CPT

## 2020-03-04 PROCEDURE — 92978 ENDOLUMINL IVUS OCT C 1ST: CPT | Mod: 26,LD

## 2020-03-04 PROCEDURE — 93454 CORONARY ARTERY ANGIO S&I: CPT | Mod: 26,59

## 2020-03-04 PROCEDURE — 99223 1ST HOSP IP/OBS HIGH 75: CPT | Mod: GC

## 2020-03-04 PROCEDURE — 71046 X-RAY EXAM CHEST 2 VIEWS: CPT | Mod: 26

## 2020-03-04 RX ORDER — ZOLPIDEM TARTRATE 10 MG/1
5 TABLET ORAL AT BEDTIME
Refills: 0 | Status: DISCONTINUED | OUTPATIENT
Start: 2020-03-04 | End: 2020-03-06

## 2020-03-04 RX ORDER — NITROGLYCERIN 6.5 MG
1 CAPSULE, EXTENDED RELEASE ORAL
Qty: 0 | Refills: 0 | DISCHARGE

## 2020-03-04 RX ORDER — HYDROCHLOROTHIAZIDE 25 MG
12.5 TABLET ORAL DAILY
Refills: 0 | Status: DISCONTINUED | OUTPATIENT
Start: 2020-03-04 | End: 2020-03-04

## 2020-03-04 RX ORDER — DEXTROSE 50 % IN WATER 50 %
25 SYRINGE (ML) INTRAVENOUS ONCE
Refills: 0 | Status: DISCONTINUED | OUTPATIENT
Start: 2020-03-04 | End: 2020-03-06

## 2020-03-04 RX ORDER — METOPROLOL TARTRATE 50 MG
200 TABLET ORAL DAILY
Refills: 0 | Status: DISCONTINUED | OUTPATIENT
Start: 2020-03-04 | End: 2020-03-06

## 2020-03-04 RX ORDER — DEXTROSE 50 % IN WATER 50 %
15 SYRINGE (ML) INTRAVENOUS ONCE
Refills: 0 | Status: DISCONTINUED | OUTPATIENT
Start: 2020-03-04 | End: 2020-03-06

## 2020-03-04 RX ORDER — DEXTROSE 50 % IN WATER 50 %
12.5 SYRINGE (ML) INTRAVENOUS ONCE
Refills: 0 | Status: DISCONTINUED | OUTPATIENT
Start: 2020-03-04 | End: 2020-03-06

## 2020-03-04 RX ORDER — GLUCAGON INJECTION, SOLUTION 0.5 MG/.1ML
1 INJECTION, SOLUTION SUBCUTANEOUS ONCE
Refills: 0 | Status: DISCONTINUED | OUTPATIENT
Start: 2020-03-04 | End: 2020-03-06

## 2020-03-04 RX ORDER — SITAGLIPTIN AND METFORMIN HYDROCHLORIDE 500; 50 MG/1; MG/1
1 TABLET, FILM COATED ORAL
Qty: 0 | Refills: 0 | DISCHARGE

## 2020-03-04 RX ORDER — PANTOPRAZOLE SODIUM 20 MG/1
40 TABLET, DELAYED RELEASE ORAL
Refills: 0 | Status: DISCONTINUED | OUTPATIENT
Start: 2020-03-04 | End: 2020-03-06

## 2020-03-04 RX ORDER — ZOLPIDEM TARTRATE 10 MG/1
5 TABLET ORAL AT BEDTIME
Refills: 0 | Status: DISCONTINUED | OUTPATIENT
Start: 2020-03-04 | End: 2020-03-04

## 2020-03-04 RX ORDER — DEXTROSE 50 % IN WATER 50 %
12.5 SYRINGE (ML) INTRAVENOUS ONCE
Refills: 0 | Status: DISCONTINUED | OUTPATIENT
Start: 2020-03-04 | End: 2020-03-04

## 2020-03-04 RX ORDER — DEXTROSE 50 % IN WATER 50 %
25 SYRINGE (ML) INTRAVENOUS ONCE
Refills: 0 | Status: DISCONTINUED | OUTPATIENT
Start: 2020-03-04 | End: 2020-03-04

## 2020-03-04 RX ORDER — CLOPIDOGREL BISULFATE 75 MG/1
75 TABLET, FILM COATED ORAL ONCE
Refills: 0 | Status: COMPLETED | OUTPATIENT
Start: 2020-03-04 | End: 2020-03-04

## 2020-03-04 RX ORDER — CLOPIDOGREL BISULFATE 75 MG/1
75 TABLET, FILM COATED ORAL DAILY
Refills: 0 | Status: DISCONTINUED | OUTPATIENT
Start: 2020-03-04 | End: 2020-03-06

## 2020-03-04 RX ORDER — SODIUM CHLORIDE 9 MG/ML
1000 INJECTION, SOLUTION INTRAVENOUS
Refills: 0 | Status: DISCONTINUED | OUTPATIENT
Start: 2020-03-04 | End: 2020-03-06

## 2020-03-04 RX ORDER — LOSARTAN POTASSIUM 100 MG/1
50 TABLET, FILM COATED ORAL DAILY
Refills: 0 | Status: DISCONTINUED | OUTPATIENT
Start: 2020-03-04 | End: 2020-03-06

## 2020-03-04 RX ORDER — ASPIRIN/CALCIUM CARB/MAGNESIUM 324 MG
81 TABLET ORAL DAILY
Refills: 0 | Status: DISCONTINUED | OUTPATIENT
Start: 2020-03-04 | End: 2020-03-06

## 2020-03-04 RX ORDER — DEXTROSE 50 % IN WATER 50 %
15 SYRINGE (ML) INTRAVENOUS ONCE
Refills: 0 | Status: DISCONTINUED | OUTPATIENT
Start: 2020-03-04 | End: 2020-03-04

## 2020-03-04 RX ORDER — ESCITALOPRAM OXALATE 10 MG/1
20 TABLET, FILM COATED ORAL DAILY
Refills: 0 | Status: DISCONTINUED | OUTPATIENT
Start: 2020-03-04 | End: 2020-03-06

## 2020-03-04 RX ORDER — TAMSULOSIN HYDROCHLORIDE 0.4 MG/1
0.4 CAPSULE ORAL AT BEDTIME
Refills: 0 | Status: DISCONTINUED | OUTPATIENT
Start: 2020-03-04 | End: 2020-03-06

## 2020-03-04 RX ORDER — SODIUM CHLORIDE 9 MG/ML
1000 INJECTION, SOLUTION INTRAVENOUS
Refills: 0 | Status: DISCONTINUED | OUTPATIENT
Start: 2020-03-04 | End: 2020-03-04

## 2020-03-04 RX ORDER — INSULIN LISPRO 100/ML
VIAL (ML) SUBCUTANEOUS AT BEDTIME
Refills: 0 | Status: DISCONTINUED | OUTPATIENT
Start: 2020-03-04 | End: 2020-03-06

## 2020-03-04 RX ORDER — INSULIN LISPRO 100/ML
VIAL (ML) SUBCUTANEOUS
Refills: 0 | Status: DISCONTINUED | OUTPATIENT
Start: 2020-03-04 | End: 2020-03-06

## 2020-03-04 RX ADMIN — CLOPIDOGREL BISULFATE 75 MILLIGRAM(S): 75 TABLET, FILM COATED ORAL at 13:59

## 2020-03-04 RX ADMIN — TAMSULOSIN HYDROCHLORIDE 0.4 MILLIGRAM(S): 0.4 CAPSULE ORAL at 22:04

## 2020-03-04 NOTE — CONSULT NOTE ADULT - SUBJECTIVE AND OBJECTIVE BOX
Patient seen and evaluated at bedside    Chief Complaint: Chest apin    HPI:   Pt is a 64 year old man with a PMHx of CAD/MI s/p multiple stents (last 8/2017), DM2 (on insulin pump), HTN, HLD, GERD presenting w/ substernal chest pain. Started having 5/10 substernal chest pain w/ exertion 5 days ago w/ radiation to left arm and associated SOB. Reports the pain got worse last night, occurring at rest, unable to sleep. Took SL nitro x3 w/o relief.      PMH:   Hemorrhoids  History of coronary angiogram  H/O: GI Bleed  Hypercholesterolemia  Coronary Stent  CAD (Coronary Artery Disease)  Heart Attack  GERD (Gastroesophageal Reflux Disease)  Diabetes Mellitus  HTN      PSH:   Benign essential HTN  Stented coronary artery  No Past Surgical History  GERD (Gastroesophageal Reflux Disease)      Medications:   clopidogrel Tablet 75 milliGRAM(s) Oral Once      Allergies:  No Known Allergies      FAMILY HISTORY:  Family history of diabetes mellitus  Family history of cardiac disorder (Grandparent)      Social History:  Smoking History:  Alcohol Use:  Drug Use:    Review of Systems:  REVIEW OF SYSTEMS:  CONSTITUTIONAL: No weakness, fevers or chills  EYES/ENT: No visual changes;  No dysphagia  NECK: No pain or stiffness  RESPIRATORY: No cough, wheezing, hemoptysis; No shortness of breath  CARDIOVASCULAR: No chest pain or palpitations; No lower extremity edema  GASTROINTESTINAL: No abdominal or epigastric pain. No nausea, vomiting, or hematemesis; No diarrhea or constipation. No melena or hematochezia.  BACK: No back pain  GENITOURINARY: No dysuria, frequency or hematuria  NEUROLOGICAL: No numbness or weakness  SKIN: No itching, burning, rashes, or lesions   All other review of systems is negative unless indicated above.    Physical Exam:  T(F): 97.4 (03-04), Max: 97.4 (03-04)  HR: 62 (03-04) (62 - 64)  BP: 146/96 (03-04) (129/78 - 146/96)  RR: 16 (03-04)  SpO2: 100% (03-04)  GENERAL: No acute distress, well-developed  HEAD:  Atraumatic, Normocephalic  ENT: EOMI, PERRLA, conjunctiva and sclera clear, Neck supple, No JVD, moist mucosa  CHEST/LUNG: Clear to auscultation bilaterally; No wheeze, equal breath sounds bilaterally   BACK: No spinal tenderness  HEART: Regular rate and rhythm; No murmurs, rubs, or gallops  ABDOMEN: Soft, Nontender, Nondistended; Bowel sounds present  EXTREMITIES:  No clubbing, cyanosis, or edema  PSYCH: Nl behavior, nl affect  NEUROLOGY: AAOx3, non-focal, cranial nerves intact  SKIN: Normal color, No rashes or lesions  LINES:    Cardiovascular Diagnostic Testing:    ECG: Personally reviewed    Echo:  TTE 3/17/16  CONCLUSIONS:  1. Mitral annular calcification. Tethered mitral valve  leaflets with normal opening. Minimal mitral regurgitation.  2. Normal trileaflet aortic valve. No aortic valve  regurgitation seen.  3. Left atrial enlargement.  4. Moderate-severe segmental left ventricular systolic  dysfunction. EF 35%. The inferior, infero-lateral, mid and  basal anterior, lateral and infero-septal walls are  akinetic.  5. Normal right atrium.  6. Normal right ventricular size and function.      Stress Testing:  NST 2/26/18:  IMPRESSIONS:Abnormal Study  * Negative ECG evidence of ischemia after IV of Lexiscan.  * Review of raw data shows: The study is of good technical  quality.  * The left ventricle was mildly dilated LV at baseline.  There is a large, severe defect in inferior wall that is  fixed consistent with myocardial infarction.There is a  small, severe defect in infero-lateral wall that is fixed  consistent with myocardial infarction..  * Dilated LV with akinetic inferior and infero-lateral  wall EF=31%.    Cath:  [facility] 11/26/18:  CORONARY VESSELS: The coronary circulation is right dominant.  LM:   --  LM: Angiography showed minor luminal irregularities with no flow  limiting lesions.  LAD:   --  Mid LAD: There was a 10 % stenosis at the site of a prior stent.  CX:   --  Circumflex: Angiography showed minor luminal irregularities with  no flow limiting lesions.  RCA:   --  RCA: Angiography showed minor luminal irregularities with no  flow limiting lesions.  --  Distal RCA: There was a 10 % stenosis at the site of a prior stent.    Interpretation of Telemetry:    Imaging:    Labs: Personally reviewed                        14.0   4.64  )-----------( 178      ( 04 Mar 2020 12:19 )             42.3 Patient seen and evaluated at bedside    Chief Complaint: Chest pain    HPI:   Pt is a 64 year old man with a PMHx of CAD/MI s/p multiple stents (last to the RCA 8/2017), DM2 (on insulin pump), HTN, HLD, GERD presenting w/ substernal chest pain. Started having 5/10 substernal chest pain w/ exertion 5 days ago w/ radiation to left arm and associated SOB. Reports pain got worse last night, occurring at rest, unable to sleep. Took SL nitro x3 w/o relief. Compliant w/ all meds including DAPT.    In the ED, VSSWNL. EKG w/ NSR, old inferior infarct, lateral TWI, unchanged from prior. HsTrop X1 11.       PMH:   Hemorrhoids  History of coronary angiogram  H/O: GI Bleed  Hypercholesterolemia  Coronary Stent  CAD (Coronary Artery Disease)  Heart Attack  GERD (Gastroesophageal Reflux Disease)  Diabetes Mellitus  HTN      PSH:   Benign essential HTN  Stented coronary artery  No Past Surgical History  GERD (Gastroesophageal Reflux Disease)      Medications:   Home Medications:  Ambien 10 mg oral tablet: 1 tab(s) orally once a day (at bedtime), As Needed insomnia (26 Nov 2018 15:59)  aspirin 325 mg oral delayed release tablet: 1 tab(s) orally once a day (26 Nov 2018 15:59)  clopidogrel 75 mg oral tablet: 1 tab(s) orally once a day (26 Nov 2018 15:59)  Crestor: 40 milligram(s) orally once a day (26 Nov 2018 15:59)  Drisdol 50,000 intl units oral capsule: 1 cap(s) orally once a week Monday (26 Nov 2018 15:59)  Flomax 0.4 mg oral capsule: 1 cap(s) orally once a day (26 Nov 2018 15:59)  gemfibrozil 600 mg oral tablet: 1 tab(s) orally 2 times a day (26 Nov 2018 15:59)  insulin: Insulin pump with Humalog ( [employer]tr[employer]) (26 Nov 2018 15:59)  Janumet 50 mg-1000 mg oral tablet: 1 tab(s) orally 2 times a day (26 Nov 2018 15:59)  Lexapro 20 mg oral tablet: 1 tab(s) orally once a day (26 Nov 2018 15:59)  metFORMIN 500 mg oral tablet, extended release: 1 tab(s) orally once a day  restart ib 11/29 (26 Nov 2018 18:32)  metoprolol succinate 200 mg oral tablet, extended release: 1 tab(s) orally once a day (26 Nov 2018 15:59)  niacin 500 mg oral capsule, extended release: 1 cap(s) orally once a day (at bedtime) (26 Nov 2018 15:59)  nitroglycerin 0.4 mg sublingual tablet: 1 tab(s) sublingual every 5 minutes, As Needed (26 Nov 2018 15:59)  Protonix 40 mg oral delayed release tablet: 1 tab(s) orally once a day (26 Nov 2018 15:59)  Ranexa 500 mg oral tablet, extended release: 1 tab(s) orally 2 times a day (26 Nov 2018 15:59)        Allergies:  No Known Allergies      FAMILY HISTORY:  Family history of diabetes mellitus  Family history of cardiac disorder (Grandparent)      Social History:  Smoking History: Former smoker  Alcohol Use: Social  Drug Use: Denies    Review of Systems:  REVIEW OF SYSTEMS:  CONSTITUTIONAL: No weakness, fevers or chills  EYES/ENT: No visual changes;  No dysphagia  NECK: No pain or stiffness  RESPIRATORY: No cough, wheezing, hemoptysis; No shortness of breath  CARDIOVASCULAR: +CP  GASTROINTESTINAL: No abdominal or epigastric pain. No nausea, vomiting, or hematemesis; No diarrhea or constipation. No melena or hematochezia.  BACK: No back pain  GENITOURINARY: No dysuria, frequency or hematuria  NEUROLOGICAL: No numbness or weakness  SKIN: No itching, burning, rashes, or lesions   All other review of systems is negative unless indicated above.    Physical Exam:  T(F): 97.4 (03-04), Max: 97.4 (03-04)  HR: 62 (03-04) (62 - 64)  BP: 146/96 (03-04) (129/78 - 146/96)  RR: 16 (03-04)  SpO2: 100% (03-04)  GENERAL: No acute distress, well-developed  HEAD:  Atraumatic, Normocephalic  ENT: EOMI, PERRLA, conjunctiva and sclera clear, Neck supple, No JVD, moist mucosa  CHEST/LUNG: Clear to auscultation bilaterally; No wheeze, equal breath sounds bilaterally   BACK: No spinal tenderness  HEART: Regular rate and rhythm; No murmurs, rubs, or gallops  ABDOMEN: Soft, Nontender, Nondistended; Bowel sounds present  EXTREMITIES:  No clubbing, cyanosis, or edema  PSYCH: Nl behavior, nl affect  NEUROLOGY: AAOx3, non-focal, cranial nerves intact  SKIN: Normal color, No rashes or lesions  LINES:    Cardiovascular Diagnostic Testing:    ECG: Personally reviewed  3/4/20: NSR, old inferior infarct, lateral TWI, unchanged from prior    Echo:  TTE 3/17/16  CONCLUSIONS:  1. Mitral annular calcification. Tethered mitral valve  leaflets with normal opening. Minimal mitral regurgitation.  2. Normal trileaflet aortic valve. No aortic valve  regurgitation seen.  3. Left atrial enlargement.  4. Moderate-severe segmental left ventricular systolic  dysfunction. EF 35%. The inferior, infero-lateral, mid and  basal anterior, lateral and infero-septal walls are  akinetic.  5. Normal right atrium.  6. Normal right ventricular size and function.      Stress Testing:  NST 2/26/18:  IMPRESSIONS:Abnormal Study  * Negative ECG evidence of ischemia after IV of Lexiscan.  * Review of raw data shows: The study is of good technical  quality.  * The left ventricle was mildly dilated LV at baseline.  There is a large, severe defect in inferior wall that is  fixed consistent with myocardial infarction.There is a  small, severe defect in infero-lateral wall that is fixed  consistent with myocardial infarction..  * Dilated LV with akinetic inferior and infero-lateral  wall EF=31%.    Cath:  [facility] 11/26/18:  CORONARY VESSELS: The coronary circulation is right dominant.  LM:   --  LM: Angiography showed minor luminal irregularities with no flow  limiting lesions.  LAD:   --  Mid LAD: There was a 10 % stenosis at the site of a prior stent.  CX:   --  Circumflex: Angiography showed minor luminal irregularities with  no flow limiting lesions.  RCA:   --  RCA: Angiography showed minor luminal irregularities with no  flow limiting lesions.  --  Distal RCA: There was a 10 % stenosis at the site of a prior stent.    Imaging:  CXR 3/4/20:  Impression:    The heart is normal in size. The lungs are clear. No pleural effusion. No pneumothorax. Mild degenerative changes of the thoracic spine. Otherwise No acute bony abnormalities could be identified.      Labs: Personally reviewed                        14.0   4.64  )-----------( 178      ( 04 Mar 2020 12:19 )             42.3

## 2020-03-04 NOTE — ED PROVIDER NOTE - CARE PLAN
Principal Discharge DX:	Unstable angina Principal Discharge DX:	Unstable angina  Secondary Diagnosis:	Chest pain

## 2020-03-04 NOTE — PROGRESS NOTE ADULT - SUBJECTIVE AND OBJECTIVE BOX
PROGRESS NOTE:   Authored by Mary Edwards MD, PGY1, Internal Medicine, pager [phone]     Patient is a 64y old  Male who presents with a chief complaint of Chest pain (04 Mar 2020 13:04)      SUBJECTIVE / OVERNIGHT EVENTS:    ADDITIONAL REVIEW OF SYSTEMS:    MEDICATIONS  (STANDING):  aspirin enteric coated 81 milliGRAM(s) Oral daily  clopidogrel Tablet 75 milliGRAM(s) Oral daily  dextrose 5%. 1000 milliLiter(s) (50 mL/Hr) IV Continuous <Continuous>  dextrose 50% Injectable 12.5 Gram(s) IV Push once  dextrose 50% Injectable 25 Gram(s) IV Push once  dextrose 50% Injectable 25 Gram(s) IV Push once  escitalopram 20 milliGRAM(s) Oral daily  hydrochlorothiazide 12.5 milliGRAM(s) Oral daily  losartan 50 milliGRAM(s) Oral daily  metoprolol succinate  milliGRAM(s) Oral daily  pantoprazole    Tablet 40 milliGRAM(s) Oral before breakfast  tamsulosin 0.4 milliGRAM(s) Oral at bedtime    MEDICATIONS  (PRN):  dextrose 40% Gel 15 Gram(s) Oral once PRN Blood Glucose LESS THAN 70 milliGRAM(s)/deciliter  glucagon  Injectable 1 milliGRAM(s) IntraMuscular once PRN Glucose LESS THAN 70 milligrams/deciliter  zolpidem 5 milliGRAM(s) Oral at bedtime PRN Insomnia  zolpidem 5 milliGRAM(s) Oral at bedtime PRN Insomnia      CAPILLARY BLOOD GLUCOSE      POCT Blood Glucose.: 78 mg/dL (04 Mar 2020 18:09)    I&O's Summary      PHYSICAL EXAM:  Vital Signs Last 24 Hrs  T(C): 36.3 (04 Mar 2020 10:44), Max: 36.3 (04 Mar 2020 10:44)  T(F): 97.4 (04 Mar 2020 10:44), Max: 97.4 (04 Mar 2020 10:44)  HR: 61 (04 Mar 2020 17:26) (61 - 64)  BP: 149/71 (04 Mar 2020 17:26) (129/78 - 149/71)  BP(mean): 97 (04 Mar 2020 17:26) (97 - 97)  RR: 20 (04 Mar 2020 17:26) (16 - 20)  SpO2: 95% (04 Mar 2020 17:26) (95% - 100%)    CONSTITUTIONAL: NAD, well-developed  RESPIRATORY: Normal respiratory effort; lungs are clear to auscultation bilaterally  CARDIOVASCULAR: Regular rate and rhythm, normal S1 and S2, no murmur/rub/gallop  ABDOMEN: Nontender to palpation, normoactive bowel sounds  MUSCLOSKELETAL: no clubbing or cyanosis of digits  PSYCH: A+O to person, place, and time; affect appropriate    LABS:                        14.0   4.64  )-----------( 178      ( 04 Mar 2020 12:19 )             42.3     03-04    141  |  102  |  9   ----------------------------<  114<H>  3.9   |  26  |  0.72    Ca    9.8      04 Mar 2020 12:19  Mg     1.7     03-04    TPro  7.2  /  Alb  4.5  /  TBili  0.5  /  DBili  x   /  AST  18  /  ALT  23  /  AlkPhos  74  03-04                RADIOLOGY & ADDITIONAL TESTS:  Results Reviewed:   Imaging Personally Reviewed:  Electrocardiogram Personally Reviewed:    COORDINATION OF CARE:  Care Discussed with Consultants/Other Providers [Y/N]:  Prior or Outpatient Records Reviewed [Y/N]: PROGRESS NOTE:   Authored by Mary Edwards MD, PGY1, Internal Medicine, pager [phone]     Patient is a 64y old  Male who presents with a chief complaint of Chest pain (04 Mar 2020 13:04)      SUBJECTIVE / OVERNIGHT EVENTS: Endorses C/P. Denies SOB, N/V, abd pain    ADDITIONAL REVIEW OF SYSTEMS:    MEDICATIONS  (STANDING):  aspirin enteric coated 81 milliGRAM(s) Oral daily  clopidogrel Tablet 75 milliGRAM(s) Oral daily  dextrose 5%. 1000 milliLiter(s) (50 mL/Hr) IV Continuous <Continuous>  dextrose 50% Injectable 12.5 Gram(s) IV Push once  dextrose 50% Injectable 25 Gram(s) IV Push once  dextrose 50% Injectable 25 Gram(s) IV Push once  escitalopram 20 milliGRAM(s) Oral daily  hydrochlorothiazide 12.5 milliGRAM(s) Oral daily  losartan 50 milliGRAM(s) Oral daily  metoprolol succinate  milliGRAM(s) Oral daily  pantoprazole    Tablet 40 milliGRAM(s) Oral before breakfast  tamsulosin 0.4 milliGRAM(s) Oral at bedtime    MEDICATIONS  (PRN):  dextrose 40% Gel 15 Gram(s) Oral once PRN Blood Glucose LESS THAN 70 milliGRAM(s)/deciliter  glucagon  Injectable 1 milliGRAM(s) IntraMuscular once PRN Glucose LESS THAN 70 milligrams/deciliter  zolpidem 5 milliGRAM(s) Oral at bedtime PRN Insomnia  zolpidem 5 milliGRAM(s) Oral at bedtime PRN Insomnia      CAPILLARY BLOOD GLUCOSE      POCT Blood Glucose.: 78 mg/dL (04 Mar 2020 18:09)    I&O's Summary      PHYSICAL EXAM:  Vital Signs Last 24 Hrs  T(C): 36.3 (04 Mar 2020 10:44), Max: 36.3 (04 Mar 2020 10:44)  T(F): 97.4 (04 Mar 2020 10:44), Max: 97.4 (04 Mar 2020 10:44)  HR: 61 (04 Mar 2020 17:26) (61 - 64)  BP: 149/71 (04 Mar 2020 17:26) (129/78 - 149/71)  BP(mean): 97 (04 Mar 2020 17:26) (97 - 97)  RR: 20 (04 Mar 2020 17:26) (16 - 20)  SpO2: 95% (04 Mar 2020 17:26) (95% - 100%)    CONSTITUTIONAL: NAD, well-developed  RESPIRATORY: Normal respiratory effort; lungs are clear to auscultation bilaterally  CARDIOVASCULAR: Regular rate and rhythm, normal S1 and S2, no murmur/rub/gallop  ABDOMEN: Nontender to palpation, normoactive bowel sounds  MUSCULOSKELETAL no clubbing or cyanosis of digits  PSYCH: A+O to person, place, and time; affect appropriate    LABS:                        14.0   4.64  )-----------( 178      ( 04 Mar 2020 12:19 )             42.3     03-04    141  |  102  |  9   ----------------------------<  114<H>  3.9   |  26  |  0.72    Ca    9.8      04 Mar 2020 12:19  Mg     1.7     03-04    TPro  7.2  /  Alb  4.5  /  TBili  0.5  /  DBili  x   /  AST  18  /  ALT  23  /  AlkPhos  74  03-04                RADIOLOGY & ADDITIONAL TESTS:  Results Reviewed:   Imaging Personally Reviewed:  Electrocardiogram Personally Reviewed:    COORDINATION OF CARE:  Care Discussed with Consultants/Other Providers [Y/N]:  Prior or Outpatient Records Reviewed [Y/N]: PROGRESS NOTE:   Authored by Mary Edwards MD, PGY1, Internal Medicine, pager [phone]     Patient is a 64y old  Male who presents with a chief complaint of Chest pain (04 Mar 2020 13:04)      ACCEPT NOTE:  65 y/o [place] Male with PMHx of MI, CAD with multiple stents, DMT2 , HTN, HLD, GERD presented to the ER today c/o 4 to 5 days of intermittent 5/10 substernal chest pain with SOB radiated to left arm worse with exertion. Patient reports chest pain last night, unable to sleep, took nitro SL x3 last night without pain relief. Chest pain increasing in intensity and frequency, came to ED. Patient here today for cardiac cath for further ischemic workup : s/p C POBA to pLAD ( ISR), staged for RPL stenosis 80% on 3/5/20.  Transferred to medicine for medical management s/p [facility].    ADDITIONAL REVIEW OF SYSTEMS:    MEDICATIONS  (STANDING):  aspirin enteric coated 81 milliGRAM(s) Oral daily  clopidogrel Tablet 75 milliGRAM(s) Oral daily  dextrose 5%. 1000 milliLiter(s) (50 mL/Hr) IV Continuous <Continuous>  dextrose 50% Injectable 12.5 Gram(s) IV Push once  dextrose 50% Injectable 25 Gram(s) IV Push once  dextrose 50% Injectable 25 Gram(s) IV Push once  escitalopram 20 milliGRAM(s) Oral daily  hydrochlorothiazide 12.5 milliGRAM(s) Oral daily  losartan 50 milliGRAM(s) Oral daily  metoprolol succinate  milliGRAM(s) Oral daily  pantoprazole    Tablet 40 milliGRAM(s) Oral before breakfast  tamsulosin 0.4 milliGRAM(s) Oral at bedtime    MEDICATIONS  (PRN):  dextrose 40% Gel 15 Gram(s) Oral once PRN Blood Glucose LESS THAN 70 milliGRAM(s)/deciliter  glucagon  Injectable 1 milliGRAM(s) IntraMuscular once PRN Glucose LESS THAN 70 milligrams/deciliter  zolpidem 5 milliGRAM(s) Oral at bedtime PRN Insomnia  zolpidem 5 milliGRAM(s) Oral at bedtime PRN Insomnia      CAPILLARY BLOOD GLUCOSE      POCT Blood Glucose.: 78 mg/dL (04 Mar 2020 18:09)    I&O's Summary      PHYSICAL EXAM:  Vital Signs Last 24 Hrs  T(C): 36.3 (04 Mar 2020 10:44), Max: 36.3 (04 Mar 2020 10:44)  T(F): 97.4 (04 Mar 2020 10:44), Max: 97.4 (04 Mar 2020 10:44)  HR: 61 (04 Mar 2020 17:26) (61 - 64)  BP: 149/71 (04 Mar 2020 17:26) (129/78 - 149/71)  BP(mean): 97 (04 Mar 2020 17:26) (97 - 97)  RR: 20 (04 Mar 2020 17:26) (16 - 20)  SpO2: 95% (04 Mar 2020 17:26) (95% - 100%)    CONSTITUTIONAL: NAD, well-developed  RESPIRATORY: Normal respiratory effort; lungs are clear to auscultation bilaterally  CARDIOVASCULAR: Regular rate and rhythm, normal S1 and S2, no murmur/rub/gallop  ABDOMEN: Nontender to palpation, normoactive bowel sounds  MUSCULOSKELETAL no clubbing or cyanosis of digits  PSYCH: A+O to person, place, and time; affect appropriate    LABS:                        14.0   4.64  )-----------( 178      ( 04 Mar 2020 12:19 )             42.3     03-04    141  |  102  |  9   ----------------------------<  114<H>  3.9   |  26  |  0.72    Ca    9.8      04 Mar 2020 12:19  Mg     1.7     03-04    TPro  7.2  /  Alb  4.5  /  TBili  0.5  /  DBili  x   /  AST  18  /  ALT  23  /  AlkPhos  74  03-04                RADIOLOGY & ADDITIONAL TESTS:  Results Reviewed:   Imaging Personally Reviewed:  Electrocardiogram Personally Reviewed:    COORDINATION OF CARE:  Care Discussed with Consultants/Other Providers [Y/N]:  Prior or Outpatient Records Reviewed [Y/N]:

## 2020-03-04 NOTE — PROGRESS NOTE ADULT - PROBLEM SELECTOR PLAN 5
DVT ppx: hep gtt  Diet: DASH/CC DVT ppx: SCDs, plan for cath tomorrow  Diet: DASH/CC - c/w home lexapro

## 2020-03-04 NOTE — PROGRESS NOTE ADULT - PROBLEM SELECTOR PLAN 1
h/o Trops negative   - s/p cath  - c/w DAPT  - c/w home lopressor  - started h/o CAD s/p stents/CABG, Trops negative this admission, pro-  - s/p C POBA to pLAD staged at 80% with plan to intervene on 3/5  - c/w DAPT  - c/w home Toprol  - c/w losartan started this admission s/p stents/CABG, p/w C/P found to have re-stenosis of stent in pLAD s/p [facility]  Trops negative this admission, pro-  - s/p [facility] POBA to pLAD staged at 80% with plan to intervene on 3/5  - c/w DAPT  - c/w home Toprol  - c/w losartan started this admission s/p stents/CABG, p/w C/P found to have re-stenosis of stent in pLAD s/p [facility]  Trops negative this admission, pro-, no STEs on EKG  - s/p [facility] POBA to pLAD staged at 80% with plan to intervene on 3/5  - c/w DAPT  - c/w home Toprol  - c/w losartan started this admission

## 2020-03-04 NOTE — ED PROVIDER NOTE - CONSTITUTIONAL, MLM
normal... PA note: Well appearing, awake, alert, oriented to person, place, time/situation and in no apparent distress.

## 2020-03-04 NOTE — ED PROVIDER NOTE - ATTENDING CONTRIBUTION TO CARE
64 year old male with PMHx of MI, CAD with multiple stents, DMT2 (on insulin pump, managed by Dr. Xiao So. ), HTN, HLD, and GERD who presents to [facility] ED c/o cp for the past 5 days at rest even with radiation down arm feels like prior chest pain, for cath on friday, ekg with no changes, likely needs admission for cad. cardiac work up.

## 2020-03-04 NOTE — PROGRESS NOTE ADULT - PROBLEM SELECTOR PLAN 3
- c/w home lopressor - c/w home Toprol  - c/w losartan started this admission Metformin at home, hgb A1C 8.7 (6/2016)   - ISS/FSG qac, qhs  - f/u AM Hgb A1C

## 2020-03-04 NOTE — ED PROVIDER NOTE - PROGRESS NOTE DETAILS
PA note: Patient is a 64 year old male with PMHx of MI, CAD with multiple stents, DMT2 (on insulin pump, managed by Dr. Xiao So. ), HTN, HLD, and GERD who presents to [facility] ED c/o squeezing intermittent substernal chest pain x 3 weeks, worse since last night. +, +SOB. Chest pain was worse last night, and he was unable to sleep, took nitro SL x3 last night without pain relief. Patient is scheduled for cardiac CATH in 2 days but CP got worse this morning and he decided to come to ED. H&P, ROS appear highly suspicious for cardiac cause of CP. Heart score >6. Will admit patient, cardio consult. ~ROSANA Multani Last Cath in 3/2018 showed as below. (03.05.18 @ 11:12)   CORONARY VESSELS: The coronary circulation is right dominant. LM:   --  LM: Angiography showed minor luminal irregularities with no flow limiting lesions. LAD:   --  Proximal LAD: There was a 20 % stenosis at the site of a prior stent. CX:   --  Circumflex: Angiography showed minor luminal irregularities with no flow limiting lesions. RCA:   --  Distal RCA: There was a 20 % stenosis at the site of a prior stent. Spoke with Cardio Dr. Hylton, will see patient in ED shortly. ~ROSANA Multani PA note: Patient seen by Dr. Hylton, will admit patient to Dr. Ramírez for cardiac CATH later today. ~ROSANA Multani

## 2020-03-04 NOTE — ED PROVIDER NOTE - OBJECTIVE STATEMENT
PA note: Patient is a 64 year old male with PMHx of MI, CAD with multiple stents, DMT2 (on insulin pump, managed by Dr. Xiao oS. ), HTN, HLD, and GERD who presents to [facility] ED c/o substernal chest pain presented to the ER today c/o 4 to 5 days of intermittent 5/10 substernal chest pain with SOB radiated to left arm worse with exertion. Patient reports chest pain last night, unable to sleep, took nitro SL x3 last night without pain relief. Chest pain increasing in intensity and frequency, came to ED. Patient here today for cardiac cath for further ischemic workup    Endo: Dr. Johnson notified about insulin pump    Last Cath in 3/2018 showed as below. (03.05.18 @ 11:12) >  CORONARY VESSELS: The coronary circulation is right dominant.   LM:   --  LM: Angiography showed minor luminal irregularities with no flow limiting lesions.  LAD:   --  Proximal LAD: There was a 20 % stenosis at the site of a prior stent.  CX:   --  Circumflex: Angiography showed minor luminal irregularities with no flow limiting lesions.  RCA:   --  Distal RCA: There was a 20 % stenosis at the site of a prior stent.  < end of copied text > PA note: Patient is a 64 year old male with PMHx of MI, CAD with multiple stents, DMT2 (on insulin pump, managed by Dr. Xiao So. ), HTN, HLD, and GERD who presents to [facility] ED c/o squeezing intermittent substernal chest pain x 3 weeks, worse since last night. +, +SOB. Chest pain was worse last night, and he was unable to sleep, took nitro SL x3 last night without pain relief. Patient is scheduled for cardiac CATH in 2 days but CP got worse this morning and he decided to come to ED. ~ROSANA Multani

## 2020-03-04 NOTE — ED PROVIDER NOTE - CLINICAL SUMMARY MEDICAL DECISION MAKING FREE TEXT BOX
See Attending Note See Attending Note    PA note: Patient is a 64 year old male with PMHx of MI, CAD with multiple stents, DMT2 (on insulin pump, managed by Dr. Xiao So. ), HTN, HLD, and GERD who presents to [facility] ED c/o squeezing intermittent substernal chest pain x 3 weeks, worse since last night. +, +SOB. Chest pain was worse last night, and he was unable to sleep, took nitro SL x3 last night without pain relief. Patient is scheduled for cardiac CATH in 2 days but CP got worse this morning and he decided to come to ED. H&P, ROS appear highly suspicious for cardiac cause of CP. Heart score >6. Will admit patient. Patient seen by Dr. Hylton, will admit patient to Dr. Ramírez for cardiac CATH later today. ~ROSANA Multani

## 2020-03-04 NOTE — PROGRESS NOTE ADULT - PROBLEM SELECTOR PLAN 6
Transitions of Care Status:  1.  Name of PCP:  2.  PCP Contacted on Admission: [ ] Y    [ ] N    3.  PCP contacted at Discharge: [ ] Y    [ ] N    [ ] N/A  4.  Post-Discharge Appointment Date and Location:  5.  Summary of Handoff given to PCP: - c/w [place] [employer]

## 2020-03-04 NOTE — PROGRESS NOTE ADULT - ASSESSMENT
63 y/o [place] Male with PMHx of MI, CAD with multiple stents, DMT2 , HTN, HLD, GERD p/w 4 to 5 days of intermittent substernal chest pain with SOB radiated to left arm worse with exertion s/p LHC POBA to pLAD ( ISR), staged for RPL stenosis 80% on 3/5/20. 65 y/o [place]val[place] Male with PMHx of MI, CAD with multiple stents, DMT2 , HTN, HLD, GERD p/w 4 to 5 days of intermittent substernal chest pain with SOB radiated to left arm worse with exertion s/p LHC POBA to pLAD (ISR), staged for RPL stenosis 80% on 3/5/20. 63 y/o [place] Male with PMHx of MI, CAD with multiple stents, DMT2 , Zan-en-Y, HTN, HLD, GERD p/w 4 to 5 days of intermittent substernal chest pain with SOB radiated to left arm worse with exertion s/p LHC POBA to pLAD (ISR), staged for RPL stenosis 80% on 3/5/20.

## 2020-03-04 NOTE — CHART NOTE - NSCHARTNOTEFT_GEN_A_CORE
Removal of Femoral Sheath    Pulses in the right lower extremity are palpable.  The patient was placed in the supine position. The insertion site was identified and the sutures were removed per protocol.    The __6__ [place] femoral sheath was then removed.   Direct pressure was applied for  __20____ minutes.   Complications: None, VSS, Good Hemostasis.     Monitoring of the right groin and both lower extremities including neuro-vascular checks and vital signs every 15 minutes x 4, then every 30 minutes x 2, then every 1 hour was ordered.    Discharge Instruction discussed with patient: ASA, Plavix, statin, diet, activities, access site care, follow up care, reportable signs and symptoms.     A/P   65 y/o [place] Male with PMHx of MI, CAD with multiple stents, DMT2 , HTN, HLD, GERD presented to the ER today c/o 4 to 5 days of intermittent 5/10 substernal chest pain with SOB radiated to left arm worse with exertion. Patient reports chest pain last night, unable to sleep, took nitro SL x3 last night without pain relief. Chest pain increasing in intensity and frequency, came to ED. Patient here today for cardiac cath for further ischemic workup: s/p C POBA to pLAD ( ISR), staged for RPL stenosis 80% on 3/5/20.    Plan: continue to monitor, Continue ASA, Plavix, Statin,   discharge planning per primary team.  Pt will follow up with his cardiologist in 1-2weeks.

## 2020-03-04 NOTE — CONSULT NOTE ADULT - ASSESSMENT
Full note to follow Pt is a 64 year old man with a PMHx of CAD/MI s/p multiple stents (last to the RCA 8/2017), DM2 (on insulin pump), HTN, HLD, GERD presenting w/ substernal chest pain.     #Chest pain: Started having 5/10 substernal chest pain w/ exertion 5 days ago w/ radiation to left arm and associated SOB. Reports pain got worse last night, occurring at rest, unable to sleep. Took SL nitro x3 w/o relief. EKG w/ NSR, old inferior infarct, lateral TWI, unchanged from prior. HsTrop X1 11.   -Continue home asa/plavix  -Continue home metoprolol succinate  -Continue home crestor  -Plan for [facility] today w/ Dr. Ramírez

## 2020-03-05 ENCOUNTER — TRANSCRIPTION ENCOUNTER (OUTPATIENT)
Age: 65
End: 2020-03-05

## 2020-03-05 LAB
ANION GAP SERPL CALC-SCNC: 14 MMOL/L — SIGNIFICANT CHANGE UP (ref 5–17)
APTT BLD: 33.8 SEC — SIGNIFICANT CHANGE UP (ref 27.5–36.3)
BLD GP AB SCN SERPL QL: NEGATIVE — SIGNIFICANT CHANGE UP
BUN SERPL-MCNC: 9 MG/DL — SIGNIFICANT CHANGE UP (ref 7–23)
CALCIUM SERPL-MCNC: 9.5 MG/DL — SIGNIFICANT CHANGE UP (ref 8.4–10.5)
CHLORIDE SERPL-SCNC: 104 MMOL/L — SIGNIFICANT CHANGE UP (ref 96–108)
CO2 SERPL-SCNC: 21 MMOL/L — LOW (ref 22–31)
CREAT SERPL-MCNC: 0.69 MG/DL — SIGNIFICANT CHANGE UP (ref 0.5–1.3)
GLUCOSE BLDC GLUCOMTR-MCNC: 104 MG/DL — HIGH (ref 70–99)
GLUCOSE BLDC GLUCOMTR-MCNC: 118 MG/DL — HIGH (ref 70–99)
GLUCOSE BLDC GLUCOMTR-MCNC: 119 MG/DL — HIGH (ref 70–99)
GLUCOSE BLDC GLUCOMTR-MCNC: 90 MG/DL — SIGNIFICANT CHANGE UP (ref 70–99)
GLUCOSE SERPL-MCNC: 102 MG/DL — HIGH (ref 70–99)
HBA1C BLD-MCNC: 5.9 % — HIGH (ref 4–5.6)
HCT VFR BLD CALC: 39.8 % — SIGNIFICANT CHANGE UP (ref 39–50)
HGB BLD-MCNC: 13 G/DL — SIGNIFICANT CHANGE UP (ref 13–17)
INR BLD: 1.09 RATIO — SIGNIFICANT CHANGE UP (ref 0.88–1.16)
MCHC RBC-ENTMCNC: 29.3 PG — SIGNIFICANT CHANGE UP (ref 27–34)
MCHC RBC-ENTMCNC: 32.7 GM/DL — SIGNIFICANT CHANGE UP (ref 32–36)
MCV RBC AUTO: 89.8 FL — SIGNIFICANT CHANGE UP (ref 80–100)
NRBC # BLD: 0 /100 WBCS — SIGNIFICANT CHANGE UP (ref 0–0)
PLATELET # BLD AUTO: 168 K/UL — SIGNIFICANT CHANGE UP (ref 150–400)
POTASSIUM SERPL-MCNC: 3.6 MMOL/L — SIGNIFICANT CHANGE UP (ref 3.5–5.3)
POTASSIUM SERPL-SCNC: 3.6 MMOL/L — SIGNIFICANT CHANGE UP (ref 3.5–5.3)
PROTHROM AB SERPL-ACNC: 12.5 SEC — SIGNIFICANT CHANGE UP (ref 10–13.1)
RBC # BLD: 4.43 M/UL — SIGNIFICANT CHANGE UP (ref 4.2–5.8)
RBC # FLD: 12.4 % — SIGNIFICANT CHANGE UP (ref 10.3–14.5)
RH IG SCN BLD-IMP: POSITIVE — SIGNIFICANT CHANGE UP
SODIUM SERPL-SCNC: 139 MMOL/L — SIGNIFICANT CHANGE UP (ref 135–145)
WBC # BLD: 5.57 K/UL — SIGNIFICANT CHANGE UP (ref 3.8–10.5)
WBC # FLD AUTO: 5.57 K/UL — SIGNIFICANT CHANGE UP (ref 3.8–10.5)

## 2020-03-05 PROCEDURE — 92928 PRQ TCAT PLMT NTRAC ST 1 LES: CPT | Mod: RC

## 2020-03-05 PROCEDURE — 93010 ELECTROCARDIOGRAM REPORT: CPT | Mod: 77

## 2020-03-05 PROCEDURE — 93010 ELECTROCARDIOGRAM REPORT: CPT

## 2020-03-05 PROCEDURE — 99152 MOD SED SAME PHYS/QHP 5/>YRS: CPT

## 2020-03-05 PROCEDURE — 99232 SBSQ HOSP IP/OBS MODERATE 35: CPT | Mod: GC

## 2020-03-05 RX ORDER — LOSARTAN POTASSIUM 100 MG/1
1 TABLET, FILM COATED ORAL
Qty: 30 | Refills: 0
Start: 2020-03-05 | End: 2020-04-03

## 2020-03-05 RX ADMIN — Medication 81 MILLIGRAM(S): at 12:09

## 2020-03-05 RX ADMIN — ESCITALOPRAM OXALATE 20 MILLIGRAM(S): 10 TABLET, FILM COATED ORAL at 12:09

## 2020-03-05 RX ADMIN — TAMSULOSIN HYDROCHLORIDE 0.4 MILLIGRAM(S): 0.4 CAPSULE ORAL at 21:49

## 2020-03-05 RX ADMIN — LOSARTAN POTASSIUM 50 MILLIGRAM(S): 100 TABLET, FILM COATED ORAL at 05:09

## 2020-03-05 RX ADMIN — PANTOPRAZOLE SODIUM 40 MILLIGRAM(S): 20 TABLET, DELAYED RELEASE ORAL at 05:09

## 2020-03-05 RX ADMIN — CLOPIDOGREL BISULFATE 75 MILLIGRAM(S): 75 TABLET, FILM COATED ORAL at 12:09

## 2020-03-05 RX ADMIN — Medication 200 MILLIGRAM(S): at 05:09

## 2020-03-05 NOTE — DISCHARGE NOTE PROVIDER - NSDCMRMEDTOKEN_GEN_ALL_CORE_FT
Ambien 10 mg oral tablet: 1 tab(s) orally once a day (at bedtime), As Needed insomnia  Aspirin Enteric Coated 81 mg oral delayed release tablet: 1 tab(s) orally once a day  Benicar HCT 20 mg-12.5 mg oral tablet: 1 tab(s) orally once a day, As Needed  clopidogrel 75 mg oral tablet: 1 tab(s) orally once a day  Drisdol 50,000 intl units oral capsule: 1 cap(s) orally once a week Monday  Flomax 0.4 mg oral capsule: 1 cap(s) orally once a day  Lexapro 20 mg oral tablet: 1 tab(s) orally once a day  metFORMIN 500 mg oral tablet, extended release: 1 tab(s) orally once a day  restart on3/8  metoprolol succinate 200 mg oral tablet, extended release: 1 tab(s) orally once a day (at bedtime)  Protonix 40 mg oral delayed release tablet: 1 tab(s) orally once a day  Repatha 140 mg/mL subcutaneous solution: 1  subcutaneous every 2 weeks for cholesterol Ambien 10 mg oral tablet: 1 tab(s) orally once a day (at bedtime), As Needed insomnia  Aspirin Enteric Coated 81 mg oral delayed release tablet: 1 tab(s) orally once a day  Benicar HCT 20 mg-12.5 mg oral tablet: 1 tab(s) orally once a day, As Needed  clopidogrel 75 mg oral tablet: 1 tab(s) orally once a day  Drisdol 50,000 intl units oral capsule: 1 cap(s) orally once a week Monday  Flomax 0.4 mg oral capsule: 1 cap(s) orally once a day  Lexapro 20 mg oral tablet: 1 tab(s) orally once a day  losartan 50 mg oral tablet: 1 tab(s) orally once a day  metFORMIN 500 mg oral tablet, extended release: 1 tab(s) orally once a day  restart on3/8  metoprolol succinate 200 mg oral tablet, extended release: 1 tab(s) orally once a day (at bedtime)  Protonix 40 mg oral delayed release tablet: 1 tab(s) orally once a day  Repatha 140 mg/mL subcutaneous solution: 1  subcutaneous every 2 weeks for cholesterol Ambien 10 mg oral tablet: 1 tab(s) orally once a day (at bedtime), As Needed insomnia  Aspirin Enteric Coated 81 mg oral delayed release tablet: 1 tab(s) orally once a day  atorvastatin 80 mg oral tablet: 1 tab(s) orally once a day   Benicar HCT 20 mg-12.5 mg oral tablet: 1 tab(s) orally once a day, As Needed  clopidogrel 75 mg oral tablet: 1 tab(s) orally once a day  Drisdol 50,000 intl units oral capsule: 1 cap(s) orally once a week Monday  Flomax 0.4 mg oral capsule: 1 cap(s) orally once a day  Lexapro 20 mg oral tablet: 1 tab(s) orally once a day  losartan 50 mg oral tablet: 1 tab(s) orally once a day  metFORMIN 500 mg oral tablet, extended release: 1 tab(s) orally once a day  restart on3/8  metoprolol succinate 200 mg oral tablet, extended release: 1 tab(s) orally once a day (at bedtime)  Protonix 40 mg oral delayed release tablet: 1 tab(s) orally once a day  Repatha 140 mg/mL subcutaneous solution: 1  subcutaneous every 2 weeks for cholesterol

## 2020-03-05 NOTE — PROGRESS NOTE ADULT - SUBJECTIVE AND OBJECTIVE BOX
PROGRESS NOTE:   Authored by Mary Edwards MD, PGY1, Internal Medicine, pager [phone]     Patient is a 64y old  Male who presents with a chief complaint of Chest pain (04 Mar 2020 13:04)      SUBJECTIVE / OVERNIGHT EVENTS: Still have chest pain on exertion. No C/P at rest. Denies SOB, N/V/D/C. Usually has nightly BM but did not have one last night.    ADDITIONAL REVIEW OF SYSTEMS:    MEDICATIONS  (STANDING):  aspirin enteric coated 81 milliGRAM(s) Oral daily  clopidogrel Tablet 75 milliGRAM(s) Oral daily  dextrose 5%. 1000 milliLiter(s) (50 mL/Hr) IV Continuous <Continuous>  dextrose 50% Injectable 12.5 Gram(s) IV Push once  dextrose 50% Injectable 25 Gram(s) IV Push once  dextrose 50% Injectable 25 Gram(s) IV Push once  escitalopram 20 milliGRAM(s) Oral daily  insulin lispro (HumaLOG) corrective regimen sliding scale   SubCutaneous three times a day before meals  insulin lispro (HumaLOG) corrective regimen sliding scale   SubCutaneous at bedtime  losartan 50 milliGRAM(s) Oral daily  metoprolol succinate  milliGRAM(s) Oral daily  pantoprazole    Tablet 40 milliGRAM(s) Oral before breakfast  tamsulosin 0.4 milliGRAM(s) Oral at bedtime    MEDICATIONS  (PRN):  dextrose 40% Gel 15 Gram(s) Oral once PRN Blood Glucose LESS THAN 70 milliGRAM(s)/deciliter  glucagon  Injectable 1 milliGRAM(s) IntraMuscular once PRN Glucose LESS THAN 70 milligrams/deciliter  zolpidem 5 milliGRAM(s) Oral at bedtime PRN Insomnia      CAPILLARY BLOOD GLUCOSE      POCT Blood Glucose.: 104 mg/dL (05 Mar 2020 07:37)  POCT Blood Glucose.: 91 mg/dL (04 Mar 2020 21:32)  POCT Blood Glucose.: 78 mg/dL (04 Mar 2020 18:09)    I&O's Summary      PHYSICAL EXAM:  Vital Signs Last 24 Hrs  T(C): 36.5 (05 Mar 2020 04:38), Max: 36.6 (04 Mar 2020 20:23)  T(F): 97.7 (05 Mar 2020 04:38), Max: 97.8 (04 Mar 2020 20:23)  HR: 60 (05 Mar 2020 04:38) (60 - 64)  BP: 131/70 (05 Mar 2020 04:38) (129/78 - 149/71)  BP(mean): 97 (04 Mar 2020 17:26) (97 - 97)  RR: 18 (05 Mar 2020 04:38) (16 - 20)  SpO2: 98% (05 Mar 2020 04:38) (95% - 100%)    CONSTITUTIONAL: NAD, well-developed  RESPIRATORY: Normal respiratory effort; lungs are clear to auscultation bilaterally  CARDIOVASCULAR: Regular rate and rhythm, normal S1 and S2, no murmur/rub/gallop; No lower extremity edema  ABDOMEN: Nontender to palpation, normoactive bowel sounds  MUSCLOSKELETAL: no clubbing or cyanosis of digits  PSYCH: A+O to person, place, and time; affect appropriate    LABS:                        13.0   5.57  )-----------( 168      ( 05 Mar 2020 07:24 )             39.8     03-05    139  |  104  |  9   ----------------------------<  102<H>  3.6   |  21<L>  |  0.69    Ca    9.5      05 Mar 2020 07:21  Mg     1.7     03-04    TPro  7.2  /  Alb  4.5  /  TBili  0.5  /  DBili  x   /  AST  18  /  ALT  23  /  AlkPhos  74  03-04    PT/INR - ( 05 Mar 2020 08:43 )   PT: 12.5 sec;   INR: 1.09 ratio         PTT - ( 05 Mar 2020 08:43 )  PTT:33.8 sec            RADIOLOGY & ADDITIONAL TESTS:  Results Reviewed:   Imaging Personally Reviewed:  Electrocardiogram Personally Reviewed:    COORDINATION OF CARE:  Care Discussed with Consultants/Other Providers [Y/N]:  Prior or Outpatient Records Reviewed [Y/N]:

## 2020-03-05 NOTE — DISCHARGE NOTE PROVIDER - HOSPITAL COURSE
65 y/o Cam[place] Male with PMHx of MI, CAD with multiple stents, DMT2 , HTN, HLD, GERD presented to the ER today c/o 4 to 5 days of intermittent 5/10 substernal chest pain with SOB radiated to left arm worse with exertion. Patient reports chest pain last night, unable to sleep, took nitro SL x3 last night without pain relief. Chest pain increasing in intensity and frequency, came to ED. Patient here today for cardiac cath for further ischemic workup : s/p LHC POBA to pLAD ( ISR), staged for RPL stenosis 80% on 3/5/20.        On 3/5, a HOMA was placed in RPL. Please see attached for additional details.    Patient advised to continue DAPT.     We also started the patient on losartan 50mg QD.        At this time the patient is hemodynamically stable and ready for discharge. 65 y/o Burmese Male with PMHx of MI, CAD with multiple stents, DMT2 , HTN, HLD, GERD presented to the ER today c/o 4 to 5 days of intermittent 5/10 substernal chest pain with SOB radiated to left arm worse with exertion. Patient reports chest pain last night, unable to sleep, took nitro SL x3 last night without pain relief. Chest pain increasing in intensity and frequency, came to ED. Patient here today for cardiac cath for further ischemic workup : s/p LHC POBA to pLAD ( ISR), staged for RPL stenosis 80% on 3/5/20.        On 3/5, a HOMA was placed in RPL. Please see attached for additional details.    Patient advised to continue DAPT.     We also started the patient on losartan 50mg QD and atorvastatin 80 mg.         At this time the patient is hemodynamically stable and ready for discharge. 65 y/o [place] Timorese Male with PMHx of MI, CAD with multiple stents, DMT2 , HTN, HLD, GERD presented to the ER today c/o 4 to 5 days of intermittent 5/10 substernal chest pain with SOB radiated to left arm worse with exertion. Patient reports chest pain last night, unable to sleep, took nitro SL x3 last night without pain relief. Chest pain increasing in intensity and frequency, came to ED. Patient here today for cardiac cath for further ischemic workup : s/p LHC POBA to pLAD ( ISR), staged for RPL stenosis 80% on 3/5/20.        On 3/5, a HOMA was placed in RPL. Please see attached for additional details.    Patient advised to continue DAPT.     We also started the patient on losartan 50mg QD and atorvastatin 80 mg.         At this time the patient is hemodynamically stable and ready for discharge on DAPT with outpatient cardiology follow up.

## 2020-03-05 NOTE — DISCHARGE NOTE PROVIDER - NSDCFUADDAPPT_GEN_ALL_CORE_FT
Please follow up with your cardiologist within the next two weeks. Please also follow up with your primary care physician within two weeks. Please keep or reschedule your already scheduled appointment with your cardiologist Dr. Blue on March 12th at 3:30pm. Please also follow up with your primary care physician within two weeks.

## 2020-03-05 NOTE — CHART NOTE - NSCHARTNOTEFT_GEN_A_CORE
Removal of Femoral Sheath    Pulses in the right lower extremity are palpable. The patient was placed in the supine position. The insertion site was identified and the sutures were removed per protocol.  The 6 [place] femoral sheath was then removed. Direct pressure was applied for  20 minutes.     Monitoring of the right groin and both lower extremities including neuro-vascular checks and vital signs every 15 minutes x 4, then every 30 minutes x 2, then every 1 hour was ordered.    Complications: None    Comments: Instructions reviewed with pt, will cont to monitor

## 2020-03-05 NOTE — DISCHARGE NOTE PROVIDER - CARE PROVIDER_API CALL
Ac Blue)  Cardiology Medicine  [address] [place] [address]  Phone: [phone]  Fax: [phone]  Follow Up Time: 2 weeks Ac Blue)  Cardiology Medicine  [address] [place] [address]  Phone: [phone]  Fax: [phone]  Scheduled Appointment: 03/12/2020 03:30 PM

## 2020-03-05 NOTE — PROGRESS NOTE ADULT - PROBLEM SELECTOR PLAN 1
s/p stents/CABG, p/w C/P found to have re-stenosis of stent in pLAD s/p C  Trops negative this admission, pro-, no STEs on EKG  - s/p [facility] POBA to pLAD (ISR)  RPL staged at 80% with plan to intervene on 3/5  - c/w DAPT  - c/w home Toprol  - c/w losartan started this admission

## 2020-03-05 NOTE — DISCHARGE NOTE PROVIDER - PROVIDER TOKENS
PROVIDER:[TOKEN:[[id number]],FOLLOWUP:[2 weeks]] PROVIDER:[TOKEN:[[id number]],SCHEDULEDAPPT:[03/12/2020],SCHEDULEDAPPTTIME:[03:30 PM]]

## 2020-03-05 NOTE — DISCHARGE NOTE PROVIDER - NSDCCPCAREPLAN_GEN_ALL_CORE_FT
PRINCIPAL DISCHARGE DIAGNOSIS  Diagnosis: Unstable angina  Assessment and Plan of Treatment: You came into the hospital because of your continued chest pain.      SECONDARY DISCHARGE DIAGNOSES  Diagnosis: Chest pain  Assessment and Plan of Treatment: PRINCIPAL DISCHARGE DIAGNOSIS  Diagnosis: Unstable angina  Assessment and Plan of Treatment: You came into the hospital because of your continued chest pain. While you were in the hospital a left heart catherization was performed. Your catherization revealed that one of your stents had been re-stenosed. The fix this then used a ballon angioplasty method to open up the stent. Pleaase continue to take your aspirin and plavix to keep your stents open. PRINCIPAL DISCHARGE DIAGNOSIS  Diagnosis: Unstable angina  Assessment and Plan of Treatment: You came into the hospital because of your continued chest pain. While you were in the hospital a left heart catherization was performed. Your catherization revealed that one of your stents had been re-stenosed. The fix this then used a ballon angioplasty method which is a surgical widening to open up the stent. Pleaase continue to take your aspirin and plavix to keep your stents open. PRINCIPAL DISCHARGE DIAGNOSIS  Diagnosis: Unstable angina  Assessment and Plan of Treatment: You came into the hospital because of your continued chest pain. While you were in the hospital a left heart catherization was performed. Your catherization revealed that one of your stents had been re-stenosed. To fix this then used a ballon angioplasty method which is a surgical widening procedure to open up the stent. The next day a drug-eluding stent was placed in the posterior lateral branch of your right coronary artery. Pleaase continue to take your aspirin and plavix to keep your stents open.

## 2020-03-05 NOTE — PROGRESS NOTE ADULT - ASSESSMENT
63 y/o [place] Male with PMHx of MI, CAD with multiple stents, DMT2 , Zan-en-Y, HTN, HLD, GERD p/w 4 to 5 days of intermittent substernal chest pain with SOB radiated to left arm worse with exertion s/p LHC POBA to pLAD (ISR), staged for RPL stenosis 80% on 3/5/20.

## 2020-03-05 NOTE — DISCHARGE NOTE PROVIDER - NSDCCPTREATMENT_GEN_ALL_CORE_FT
PRINCIPAL PROCEDURE  Procedure: Left heart catheterization  Findings and Treatment: PROCEDURE:  --  Sonosite - Interventional.  --  Intervention on RPL1: drug-eluting stent.  TECHNIQUE: Oxygen 2 L/min. The risks and alternatives of the procedures and  conscious sedation were explained to the patient and informed consent was  obtained. Cardiac catheterization performed urgently. Coronary  intervention performed electively.  Local anesthetic given. Right femoral artery access. RADIATION EXPOSURE:  5.6 min. A successful drug-eluting stent was performed on the 90 % lesion  in the 1st right posterolateral segment. Following intervention there was  a 1 % residual stenosis. According to the ACC/AHA classification system,  this lesion was a type C lesion. There was KENRICK 3 flow before the  procedure and KENRICK 3 flow after the procedure. Vessel setup was performed.  A 6FR JR4 LAUNCHER guiding catheter was used to intubate the vessel.  Vessel setup was performed. A BMW UNIVERSAL 190CM wire was used to cross  the lesion. A 2.75 X 15 HERIBERTO drug-eluting stent was placed across the  lesion and deployed at a maximum inflation pressure of 14 ted. Sonosite -  Interventional.  CONTRAST GIVEN: Omnipaque 28 ml.  MEDICATIONS GIVEN: Fentanyl, 25 mcg, IV. Midazolam, 1 mg, IV.  Nitroglycerin, 100 mcg, intracoronary. Nicardipine (Cardene), 250 mcg,  intracoronary. Heparin, 9500 units, IV.  CORONARY VESSELS:  RCA:   --  RPL1: There was a 90 % stenosis.  COMPLICATIONS: There were no complications.  DIAGNOSTIC RECOMMENDATIONS: ASA and Plavix for 1 year.  INTERVENTIONAL RECOMMENDATIONS: ASA and Plavix for 1 year.

## 2020-03-05 NOTE — PROGRESS NOTE ADULT - PROBLEM SELECTOR PLAN 3
Metformin at home, hgb A1C 8.7 (6/2016)   - ISS/FSG qac, qhs  - Hgb A1C 5.9% on 3/5/2020 (pre-diabetic)

## 2020-03-05 NOTE — DISCHARGE NOTE PROVIDER - NSDCFUSCHEDAPPT_GEN_ALL_CORE_FT
DEO HORTON ; 03/06/2020 ; [facility] CAR-CathLab DEO HORTON ; 03/06/2020 ; [facility] CAR-CathLab DEO HORTON ; 03/06/2020 ; [facility] CAR-CathLab DEO HORTON ; 03/06/2020 ; [facility] CAR-CathLab DEO HORTON ; 03/06/2020 ; [facility] CAR-CathLab DEO HORTON ; 03/06/2020 ; [facility] CAR-CathLab DEO HORTON ; 03/06/2020 ; [facility] CAR-CathLab DEO HORTON ; 03/06/2020 ; [facility] CAR-CathLab

## 2020-03-06 ENCOUNTER — TRANSCRIPTION ENCOUNTER (OUTPATIENT)
Age: 65
End: 2020-03-06

## 2020-03-06 VITALS
OXYGEN SATURATION: 95 % | RESPIRATION RATE: 18 BRPM | HEART RATE: 51 BPM | SYSTOLIC BLOOD PRESSURE: 99 MMHG | TEMPERATURE: 98 F | DIASTOLIC BLOOD PRESSURE: 60 MMHG

## 2020-03-06 LAB
GLUCOSE BLDC GLUCOMTR-MCNC: 103 MG/DL — HIGH (ref 70–99)
GLUCOSE BLDC GLUCOMTR-MCNC: 104 MG/DL — HIGH (ref 70–99)
HCT VFR BLD CALC: 39.2 % — SIGNIFICANT CHANGE UP (ref 39–50)
HGB BLD-MCNC: 12.8 G/DL — LOW (ref 13–17)
MCHC RBC-ENTMCNC: 29.4 PG — SIGNIFICANT CHANGE UP (ref 27–34)
MCHC RBC-ENTMCNC: 32.7 GM/DL — SIGNIFICANT CHANGE UP (ref 32–36)
MCV RBC AUTO: 90.1 FL — SIGNIFICANT CHANGE UP (ref 80–100)
NRBC # BLD: 0 /100 WBCS — SIGNIFICANT CHANGE UP (ref 0–0)
PLATELET # BLD AUTO: 158 K/UL — SIGNIFICANT CHANGE UP (ref 150–400)
RBC # BLD: 4.35 M/UL — SIGNIFICANT CHANGE UP (ref 4.2–5.8)
RBC # FLD: 12.2 % — SIGNIFICANT CHANGE UP (ref 10.3–14.5)
WBC # BLD: 4.77 K/UL — SIGNIFICANT CHANGE UP (ref 3.8–10.5)
WBC # FLD AUTO: 4.77 K/UL — SIGNIFICANT CHANGE UP (ref 3.8–10.5)

## 2020-03-06 PROCEDURE — 71046 X-RAY EXAM CHEST 2 VIEWS: CPT

## 2020-03-06 PROCEDURE — 80048 BASIC METABOLIC PNL TOTAL CA: CPT

## 2020-03-06 PROCEDURE — 85730 THROMBOPLASTIN TIME PARTIAL: CPT

## 2020-03-06 PROCEDURE — C9600: CPT | Mod: RC

## 2020-03-06 PROCEDURE — C1874: CPT

## 2020-03-06 PROCEDURE — 86901 BLOOD TYPING SEROLOGIC RH(D): CPT

## 2020-03-06 PROCEDURE — 92920 PRQ TRLUML C ANGIOP 1ART&/BR: CPT | Mod: LD

## 2020-03-06 PROCEDURE — 99239 HOSP IP/OBS DSCHRG MGMT >30: CPT | Mod: GC

## 2020-03-06 PROCEDURE — 93005 ELECTROCARDIOGRAM TRACING: CPT

## 2020-03-06 PROCEDURE — C1725: CPT

## 2020-03-06 PROCEDURE — 99152 MOD SED SAME PHYS/QHP 5/>YRS: CPT

## 2020-03-06 PROCEDURE — 93571 IV DOP VEL&/PRESS C FLO 1ST: CPT | Mod: LD

## 2020-03-06 PROCEDURE — 93454 CORONARY ARTERY ANGIO S&I: CPT | Mod: 59

## 2020-03-06 PROCEDURE — 80053 COMPREHEN METABOLIC PANEL: CPT

## 2020-03-06 PROCEDURE — C1753: CPT

## 2020-03-06 PROCEDURE — 84484 ASSAY OF TROPONIN QUANT: CPT

## 2020-03-06 PROCEDURE — 83036 HEMOGLOBIN GLYCOSYLATED A1C: CPT

## 2020-03-06 PROCEDURE — 83735 ASSAY OF MAGNESIUM: CPT

## 2020-03-06 PROCEDURE — 86850 RBC ANTIBODY SCREEN: CPT

## 2020-03-06 PROCEDURE — 86900 BLOOD TYPING SEROLOGIC ABO: CPT

## 2020-03-06 PROCEDURE — C1894: CPT

## 2020-03-06 PROCEDURE — C1887: CPT

## 2020-03-06 PROCEDURE — 85027 COMPLETE CBC AUTOMATED: CPT

## 2020-03-06 PROCEDURE — C1769: CPT

## 2020-03-06 PROCEDURE — 82962 GLUCOSE BLOOD TEST: CPT

## 2020-03-06 PROCEDURE — 99285 EMERGENCY DEPT VISIT HI MDM: CPT | Mod: 25

## 2020-03-06 PROCEDURE — 85610 PROTHROMBIN TIME: CPT

## 2020-03-06 PROCEDURE — 99153 MOD SED SAME PHYS/QHP EA: CPT

## 2020-03-06 PROCEDURE — 92978 ENDOLUMINL IVUS OCT C 1ST: CPT | Mod: LD

## 2020-03-06 PROCEDURE — 83880 ASSAY OF NATRIURETIC PEPTIDE: CPT

## 2020-03-06 RX ORDER — ATORVASTATIN CALCIUM 80 MG/1
1 TABLET, FILM COATED ORAL
Qty: 30 | Refills: 0
Start: 2020-03-06 | End: 2020-04-04

## 2020-03-06 RX ADMIN — ESCITALOPRAM OXALATE 20 MILLIGRAM(S): 10 TABLET, FILM COATED ORAL at 11:55

## 2020-03-06 RX ADMIN — CLOPIDOGREL BISULFATE 75 MILLIGRAM(S): 75 TABLET, FILM COATED ORAL at 11:55

## 2020-03-06 RX ADMIN — LOSARTAN POTASSIUM 50 MILLIGRAM(S): 100 TABLET, FILM COATED ORAL at 06:22

## 2020-03-06 RX ADMIN — PANTOPRAZOLE SODIUM 40 MILLIGRAM(S): 20 TABLET, DELAYED RELEASE ORAL at 06:22

## 2020-03-06 RX ADMIN — Medication 81 MILLIGRAM(S): at 11:55

## 2020-03-06 RX ADMIN — Medication 200 MILLIGRAM(S): at 06:22

## 2020-03-06 NOTE — PROGRESS NOTE ADULT - SUBJECTIVE AND OBJECTIVE BOX
No events overnight. Denies CP, SOB, palp, dizziness.    MEDICATIONS:  aspirin enteric coated 81 milliGRAM(s) Oral daily  clopidogrel Tablet 75 milliGRAM(s) Oral daily  losartan 50 milliGRAM(s) Oral daily  metoprolol succinate  milliGRAM(s) Oral daily  tamsulosin 0.4 milliGRAM(s) Oral at bedtime  escitalopram 20 milliGRAM(s) Oral daily  zolpidem 5 milliGRAM(s) Oral at bedtime PRN    pantoprazole    Tablet 40 milliGRAM(s) Oral before breakfast    dextrose 40% Gel 15 Gram(s) Oral once PRN  dextrose 50% Injectable 12.5 Gram(s) IV Push once  dextrose 50% Injectable 25 Gram(s) IV Push once  dextrose 50% Injectable 25 Gram(s) IV Push once  glucagon  Injectable 1 milliGRAM(s) IntraMuscular once PRN  insulin lispro (HumaLOG) corrective regimen sliding scale   SubCutaneous three times a day before meals  insulin lispro (HumaLOG) corrective regimen sliding scale   SubCutaneous at bedtime    dextrose 5%. 1000 milliLiter(s) IV Continuous <Continuous>      REVIEW OF SYSTEMS:    CONSTITUTIONAL: No weakness, fevers or chills  EYES/ENT: No visual changes;  No dysphagia  RESPIRATORY: No cough, wheezing, hemoptysis; No shortness of breath  CARDIOVASCULAR: No chest pain or palpitations; No lower extremity edema  GASTROINTESTINAL: No abdominal or epigastric pain. No nausea, vomiting, or hematemesis  GENITOURINARY: No dysuria, frequency or hematuria  NEUROLOGICAL: No numbness or weakness  SKIN: No itching, burning, rashes, or lesions   HEME: No bleeding or bruising  MSK: No joint pains or muscle pains  All other review of systems is negative unless indicated above.    PHYSICAL EXAM:  T(C): 36.8 (03-06-20 @ 04:27), Max: 37 (03-05-20 @ 20:20)  HR: 57 (03-06-20 @ 04:27) (47 - 65)  BP: 110/70 (03-06-20 @ 05:06) (106/69 - 152/98)  RR: 18 (03-06-20 @ 04:27) (18 - 18)  SpO2: 95% (03-06-20 @ 04:27) (94% - 98%)  Wt(kg): --  I&O's Summary    05 Mar 2020 07:01  -  06 Mar 2020 07:00  --------------------------------------------------------  IN: 960 mL / OUT: 0 mL / NET: 960 mL    06 Mar 2020 07:01  -  06 Mar 2020 11:17  --------------------------------------------------------  IN: 240 mL / OUT: 0 mL / NET: 240 mL        Appearance: Normal	  HEENT:   Normal oral mucosa  Cardiovascular: Normal S1 S2, No JVD, No murmurs, No edema  Respiratory: Lungs clear to auscultation	  Psychiatry: A & O x 3, Mood & affect appropriate  Gastrointestinal:  Soft, Non-tender, + BS	  Skin: No rashes, No ecchymoses, No cyanosis	  Neurologic: Non-focal  Extremities: groin site w/o hematoma, soft, non-tender, pulses intact        LABS:	 	    CBC Full  -  ( 06 Mar 2020 07:08 )  WBC Count : 4.77 K/uL  Hemoglobin : 12.8 g/dL  Hematocrit : 39.2 %  Platelet Count - Automated : 158 K/uL  Mean Cell Volume : 90.1 fl  Mean Cell Hemoglobin : 29.4 pg  Mean Cell Hemoglobin Concentration : 32.7 gm/dL  Auto Neutrophil # : x  Auto Lymphocyte # : x  Auto Monocyte # : x  Auto Eosinophil # : x  Auto Basophil # : x  Auto Neutrophil % : x  Auto Lymphocyte % : x  Auto Monocyte % : x  Auto Eosinophil % : x  Auto Basophil % : x    03-05    139  |  104  |  9   ----------------------------<  102<H>  3.6   |  21<L>  |  0.69  03-04    141  |  102  |  9   ----------------------------<  114<H>  3.9   |  26  |  0.72    Ca    9.5      05 Mar 2020 07:21  Ca    9.8      04 Mar 2020 12:19  Mg     1.7     03-04    TPro  7.2  /  Alb  4.5  /  TBili  0.5  /  DBili  x   /  AST  18  /  ALT  23  /  AlkPhos  74  03-04      proBNP: Serum Pro-Brain Natriuretic Peptide: 129 pg/mL (03-04-20 @ 12:19)    ASSESSMENT/PLAN: 	  63 y/o M with CAD with multiple stents, DM2 , Zan-en-Y, HTN, HLD, GERD p/w CP, now s/p LHC POBA to pLAD (ISR), staged stent to RPL.    - groin site w/o hematoma, non-tender, pulses intact  - c/w asa, plavix, losartan, metop  - start atorvastatin 80mg daily  - outpatient f/u in 1 week    Darlene Miranda MD  Cardiology Fellow, M-F 7:30A-5P  [phone]    All Cardiology service information can be found on [email or web address], password: [email or web address].

## 2020-03-06 NOTE — PROGRESS NOTE ADULT - SUBJECTIVE AND OBJECTIVE BOX
< from: Cardiac Cath Lab - Adult (03.04.20 @ 16:10) >  --  Intervention on proximal LAD: cutting balloon.  DIAGNOSTIC RECOMMENDATIONS: ASA and Plavix for 1 year.  IFR of the LAD was significant at 0.88  IVUS showed underdeployed stent and expanded with a 3.5 mm NC balloon  INTERVENTIONAL RECOMMENDATIONS: ASA and Plavix for 1 year.    < end of copied text >    < from: Cardiac Cath Lab - Adult (03.05.20 @ 15:51) >  --  Intervention on RPL1: drug-eluting stent.  DIAGNOSTIC RECOMMENDATIONS: ASA and Plavix for 1 year.  INTERVENTIONAL RECOMMENDATIONS: ASA and Plavix for 1 year.    < end of copied text > PROGRESS NOTE:   Authored by Mayr Edwards MD, PGY1, Internal Medicine, pager [phone]     Patient is a 64y old  Male who presents with a chief complaint of C/P s/p C (05 Mar 2020 10:31)      SUBJECTIVE / OVERNIGHT EVENTS: Patient denies C/P, SOB, N/V/D/C, dizziness, lightheadedness.    ADDITIONAL REVIEW OF SYSTEMS:    MEDICATIONS  (STANDING):  aspirin enteric coated 81 milliGRAM(s) Oral daily  clopidogrel Tablet 75 milliGRAM(s) Oral daily  dextrose 5%. 1000 milliLiter(s) (50 mL/Hr) IV Continuous <Continuous>  dextrose 50% Injectable 12.5 Gram(s) IV Push once  dextrose 50% Injectable 25 Gram(s) IV Push once  dextrose 50% Injectable 25 Gram(s) IV Push once  escitalopram 20 milliGRAM(s) Oral daily  insulin lispro (HumaLOG) corrective regimen sliding scale   SubCutaneous three times a day before meals  insulin lispro (HumaLOG) corrective regimen sliding scale   SubCutaneous at bedtime  losartan 50 milliGRAM(s) Oral daily  metoprolol succinate  milliGRAM(s) Oral daily  pantoprazole    Tablet 40 milliGRAM(s) Oral before breakfast  tamsulosin 0.4 milliGRAM(s) Oral at bedtime    MEDICATIONS  (PRN):  dextrose 40% Gel 15 Gram(s) Oral once PRN Blood Glucose LESS THAN 70 milliGRAM(s)/deciliter  glucagon  Injectable 1 milliGRAM(s) IntraMuscular once PRN Glucose LESS THAN 70 milligrams/deciliter  zolpidem 5 milliGRAM(s) Oral at bedtime PRN Insomnia      CAPILLARY BLOOD GLUCOSE      POCT Blood Glucose.: 103 mg/dL (06 Mar 2020 07:50)  POCT Blood Glucose.: 119 mg/dL (05 Mar 2020 21:09)  POCT Blood Glucose.: 90 mg/dL (05 Mar 2020 16:42)  POCT Blood Glucose.: 118 mg/dL (05 Mar 2020 11:38)    I&O's Summary    05 Mar 2020 07:01  -  06 Mar 2020 07:00  --------------------------------------------------------  IN: 960 mL / OUT: 0 mL / NET: 960 mL        PHYSICAL EXAM:  Vital Signs Last 24 Hrs  T(C): 36.8 (06 Mar 2020 04:27), Max: 37 (05 Mar 2020 20:20)  T(F): 98.2 (06 Mar 2020 04:27), Max: 98.6 (05 Mar 2020 20:20)  HR: 57 (06 Mar 2020 04:27) (47 - 65)  BP: 110/70 (06 Mar 2020 05:06) (106/69 - 152/98)  BP(mean): --  RR: 18 (06 Mar 2020 04:27) (18 - 18)  SpO2: 95% (06 Mar 2020 04:27) (94% - 98%)    CONSTITUTIONAL: NAD, well-developed  RESPIRATORY: Normal respiratory effort; lungs are clear to auscultation bilaterally  CARDIOVASCULAR: Regular rate and rhythm, normal S1 and S2, no murmur/rub/gallop; No lower extremity edema, R groin site from [facility] with ecchymoses  ABDOMEN: Nontender to palpation, normoactive bowel sounds  MUSCULOSKELETAL no clubbing or cyanosis of digits  PSYCH: A+O to person, place, and time; affect appropriate    LABS:                        12.8   4.77  )-----------( 158      ( 06 Mar 2020 07:08 )             39.2     03-05    139  |  104  |  9   ----------------------------<  102<H>  3.6   |  21<L>  |  0.69    Ca    9.5      05 Mar 2020 07:21  Mg     1.7     03-04    TPro  7.2  /  Alb  4.5  /  TBili  0.5  /  DBili  x   /  AST  18  /  ALT  23  /  AlkPhos  74  03-04    PT/INR - ( 05 Mar 2020 08:43 )   PT: 12.5 sec;   INR: 1.09 ratio         PTT - ( 05 Mar 2020 08:43 )  PTT:33.8 sec            RADIOLOGY & ADDITIONAL TESTS:  Results Reviewed:   Imaging Personally Reviewed:  Electrocardiogram Personally Reviewed:    < from: Cardiac Cath Lab - Adult (03.04.20 @ 16:10) >  --  Intervention on proximal LAD: cutting balloon.  DIAGNOSTIC RECOMMENDATIONS: ASA and Plavix for 1 year.  IFR of the LAD was significant at 0.88  IVUS showed underdeployed stent and expanded with a 3.5 mm NC balloon  INTERVENTIONAL RECOMMENDATIONS: ASA and Plavix for 1 year.    < end of copied text >    < from: Cardiac Cath Lab - Adult (03.05.20 @ 15:51) >  --  Intervention on RPL1: drug-eluting stent.  DIAGNOSTIC RECOMMENDATIONS: ASA and Plavix for 1 year.  INTERVENTIONAL RECOMMENDATIONS: ASA and Plavix for 1 year.    < end of copied text >    COORDINATION OF CARE:  Care Discussed with Consultants/Other Providers [Y/N]:  Prior or Outpatient Records Reviewed [Y/N]:

## 2020-03-06 NOTE — PROGRESS NOTE ADULT - ATTENDING COMMENTS
Agree with above. Pending procedure time, patient stable for discharge today with outpatietn cardilogy follow up and to remain on DAPT therapy.   d/c planning 32 minutes.
patient s/p staged cath yesterday. stable for discharge with outpatient cardiology follow up on DAPT.   D/C planning 31 minutes.
Agree with above. Patient presents with chest pain found to have re-stenosis (initially told in-stent thrombosis, but normal ECG, normal trops, much more likely re-stenosis) of LAD stent, s/p POBA, also with 80% lesion to RPL tomorrow.   Right groin site c/d/i with sheath in place  -contineu DAPT, statin.  -PCI tomorrow, then d/c tomorrow vs. 3/6 pending timing of cath.

## 2020-03-06 NOTE — PROGRESS NOTE ADULT - PROBLEM SELECTOR PLAN 8
Transitions of Care Status:  1.  Name of PCP: Nimesh Eli  2.  PCP Contacted on Admission: [ ] Y    [x] N    3.  PCP contacted at Discharge: [ ] Y    [ ] N    [ ] N/A  4.  Post-Discharge Appointment Date and Location:  5.  Summary of Handoff given to PCP:

## 2020-03-06 NOTE — PROGRESS NOTE ADULT - PROBLEM SELECTOR PLAN 1
s/p stents/CABG, p/w C/P found to have re-stenosis of stent in pLAD s/p C  Trops negative this admission, pro-, no STEs on EKG  - s/p [facility] POBA to pLAD (ISR)  RPL staged at 80% with plan to intervene on 3/5  - c/w DAPT  - c/w home Toprol  - c/w losartan started this admission

## 2020-03-06 NOTE — DISCHARGE NOTE NURSING/CASE MANAGEMENT/SOCIAL WORK - PATIENT PORTAL LINK FT
You can access the FollowMyHealth Patient Portal offered by [facility] by registering at the following website: [email or web address]. By joining [email or web address]’s FollowMyHealth portal, you will also be able to view your health information using other applications (apps) compatible with our system.

## 2020-03-06 NOTE — PROGRESS NOTE ADULT - PROBLEM SELECTOR PLAN 7
DVT ppx: SCDs  Diet: DASH/CC
DVT ppx: SCDs, plan for cath tomorrow  Diet: DASH/CC
DVT ppx: SCDs, plan for cath tomorrow  Diet: DASH/CC

## 2020-03-06 NOTE — PROGRESS NOTE ADULT - ASSESSMENT
65 y/o [place] Male with PMHx of MI, CAD with multiple stents, DMT2 , Zan-en-Y, HTN, HLD, GERD p/w 4 to 5 days of intermittent substernal chest pain with SOB radiated to left arm worse with exertion s/p [facility] POBA to pLAD (ISR) on 3/4 and HOMA to RPL on 3/5.

## 2020-03-06 NOTE — DISCHARGE NOTE NURSING/CASE MANAGEMENT/SOCIAL WORK - NSDCFUADDAPPT_GEN_ALL_CORE_FT
Please follow up with your cardiologist within the next two weeks. Please also follow up with your primary care physician within two weeks.

## 2020-03-06 NOTE — CHART NOTE - NSCHARTNOTEFT_GEN_A_CORE
Patient sent out on Benicar and Losartan.  Team to call patient and inform him not to take losartan (new prescription) and to continue Benicar.
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
## 2020-07-19 ENCOUNTER — APPOINTMENT (OUTPATIENT)
Dept: DISASTER EMERGENCY | Facility: CLINIC | Age: 65
End: 2020-07-19

## 2020-07-20 LAB — SARS-COV-2 N GENE NPH QL NAA+PROBE: NOT DETECTED

## 2020-07-22 ENCOUNTER — OUTPATIENT (OUTPATIENT)
Dept: OUTPATIENT SERVICES | Facility: HOSPITAL | Age: 65
LOS: 1 days | End: 2020-07-22
Payer: MEDICAID

## 2020-07-22 DIAGNOSIS — Z95.5 PRESENCE OF CORONARY ANGIOPLASTY IMPLANT AND GRAFT: Chronic | ICD-10-CM

## 2020-07-22 DIAGNOSIS — I25.10 ATHEROSCLEROTIC HEART DISEASE OF NATIVE CORONARY ARTERY WITHOUT ANGINA PECTORIS: ICD-10-CM

## 2020-07-22 DIAGNOSIS — Z98.84 BARIATRIC SURGERY STATUS: Chronic | ICD-10-CM

## 2020-07-22 LAB
ANION GAP SERPL CALC-SCNC: 13 MMOL/L — SIGNIFICANT CHANGE UP (ref 5–17)
BUN SERPL-MCNC: 11 MG/DL — SIGNIFICANT CHANGE UP (ref 7–23)
CALCIUM SERPL-MCNC: 10 MG/DL — SIGNIFICANT CHANGE UP (ref 8.4–10.5)
CHLORIDE SERPL-SCNC: 102 MMOL/L — SIGNIFICANT CHANGE UP (ref 96–108)
CO2 SERPL-SCNC: 23 MMOL/L — SIGNIFICANT CHANGE UP (ref 22–31)
CREAT SERPL-MCNC: 0.71 MG/DL — SIGNIFICANT CHANGE UP (ref 0.5–1.3)
GLUCOSE BLDC GLUCOMTR-MCNC: 124 MG/DL — HIGH (ref 70–99)
GLUCOSE BLDC GLUCOMTR-MCNC: 90 MG/DL — SIGNIFICANT CHANGE UP (ref 70–99)
GLUCOSE SERPL-MCNC: 129 MG/DL — HIGH (ref 70–99)
HCT VFR BLD CALC: 44.6 % — SIGNIFICANT CHANGE UP (ref 39–50)
HGB BLD-MCNC: 14.7 G/DL — SIGNIFICANT CHANGE UP (ref 13–17)
MCHC RBC-ENTMCNC: 29.4 PG — SIGNIFICANT CHANGE UP (ref 27–34)
MCHC RBC-ENTMCNC: 33 GM/DL — SIGNIFICANT CHANGE UP (ref 32–36)
MCV RBC AUTO: 89.2 FL — SIGNIFICANT CHANGE UP (ref 80–100)
NRBC # BLD: 0 /100 WBCS — SIGNIFICANT CHANGE UP (ref 0–0)
PLATELET # BLD AUTO: 192 K/UL — SIGNIFICANT CHANGE UP (ref 150–400)
POTASSIUM SERPL-MCNC: 4.1 MMOL/L — SIGNIFICANT CHANGE UP (ref 3.5–5.3)
POTASSIUM SERPL-SCNC: 4.1 MMOL/L — SIGNIFICANT CHANGE UP (ref 3.5–5.3)
RBC # BLD: 5 M/UL — SIGNIFICANT CHANGE UP (ref 4.2–5.8)
RBC # FLD: 12.6 % — SIGNIFICANT CHANGE UP (ref 10.3–14.5)
SODIUM SERPL-SCNC: 138 MMOL/L — SIGNIFICANT CHANGE UP (ref 135–145)
WBC # BLD: 5.51 K/UL — SIGNIFICANT CHANGE UP (ref 3.8–10.5)
WBC # FLD AUTO: 5.51 K/UL — SIGNIFICANT CHANGE UP (ref 3.8–10.5)

## 2020-07-22 PROCEDURE — 99152 MOD SED SAME PHYS/QHP 5/>YRS: CPT

## 2020-07-22 PROCEDURE — 93454 CORONARY ARTERY ANGIO S&I: CPT | Mod: 26,59

## 2020-07-22 PROCEDURE — C9600: CPT | Mod: LD

## 2020-07-22 PROCEDURE — 93010 ELECTROCARDIOGRAM REPORT: CPT | Mod: 77

## 2020-07-22 PROCEDURE — C1887: CPT

## 2020-07-22 PROCEDURE — 82962 GLUCOSE BLOOD TEST: CPT

## 2020-07-22 PROCEDURE — C1753: CPT

## 2020-07-22 PROCEDURE — 92928 PRQ TCAT PLMT NTRAC ST 1 LES: CPT | Mod: LD

## 2020-07-22 PROCEDURE — 80048 BASIC METABOLIC PNL TOTAL CA: CPT

## 2020-07-22 PROCEDURE — 85027 COMPLETE CBC AUTOMATED: CPT

## 2020-07-22 PROCEDURE — 92978 ENDOLUMINL IVUS OCT C 1ST: CPT | Mod: 26,LD

## 2020-07-22 PROCEDURE — 99153 MOD SED SAME PHYS/QHP EA: CPT

## 2020-07-22 PROCEDURE — 92978 ENDOLUMINL IVUS OCT C 1ST: CPT | Mod: LD

## 2020-07-22 PROCEDURE — C1769: CPT

## 2020-07-22 PROCEDURE — 93005 ELECTROCARDIOGRAM TRACING: CPT

## 2020-07-22 PROCEDURE — C1894: CPT

## 2020-07-22 PROCEDURE — 93454 CORONARY ARTERY ANGIO S&I: CPT | Mod: 59

## 2020-07-22 PROCEDURE — C1874: CPT

## 2020-07-22 PROCEDURE — 93010 ELECTROCARDIOGRAM REPORT: CPT

## 2020-07-22 RX ORDER — CLOPIDOGREL BISULFATE 75 MG/1
1 TABLET, FILM COATED ORAL
Qty: 90 | Refills: 3
Start: 2020-07-22 | End: 2021-07-16

## 2020-07-22 NOTE — PROGRESS NOTE ADULT - SUBJECTIVE AND OBJECTIVE BOX
Removal of Femoral Sheath    Pulses in the (right) lower extremity are (palpable/audible by doppler/absent). The patient was placed in the supine position. The insertion site was identified and the sutures were removed per protocol.  The _6___ [place] femoral sheath was then removed. Direct pressure was applied for  ____20__ minutes.     Monitoring of the (right) groin and both lower extremities including neuro-vascular checks and vital signs every 15 minutes x 4, then every 30 minutes x 2, then every 1 hour was ordered.    Complications: None
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
## 2021-01-03 ENCOUNTER — EMERGENCY (EMERGENCY)
Facility: HOSPITAL | Age: 66
LOS: 1 days | Discharge: HOME | End: 2021-01-03
Attending: EMERGENCY MEDICINE
Payer: MEDICAID

## 2021-01-03 VITALS
HEART RATE: 71 BPM | SYSTOLIC BLOOD PRESSURE: 138 MMHG | OXYGEN SATURATION: 99 % | RESPIRATION RATE: 18 BRPM | DIASTOLIC BLOOD PRESSURE: 76 MMHG

## 2021-01-03 VITALS
SYSTOLIC BLOOD PRESSURE: 133 MMHG | WEIGHT: 214.07 LBS | TEMPERATURE: 98 F | OXYGEN SATURATION: 100 % | HEART RATE: 75 BPM | RESPIRATION RATE: 16 BRPM | HEIGHT: 71 IN | DIASTOLIC BLOOD PRESSURE: 87 MMHG

## 2021-01-03 DIAGNOSIS — Z95.5 PRESENCE OF CORONARY ANGIOPLASTY IMPLANT AND GRAFT: Chronic | ICD-10-CM

## 2021-01-03 DIAGNOSIS — Z98.84 BARIATRIC SURGERY STATUS: Chronic | ICD-10-CM

## 2021-01-03 LAB
ALBUMIN SERPL ELPH-MCNC: 4.4 G/DL — SIGNIFICANT CHANGE UP (ref 3.3–5)
ALP SERPL-CCNC: 88 U/L — SIGNIFICANT CHANGE UP (ref 40–120)
ALT FLD-CCNC: 19 U/L — SIGNIFICANT CHANGE UP (ref 10–45)
ANION GAP SERPL CALC-SCNC: 13 MMOL/L — SIGNIFICANT CHANGE UP (ref 5–17)
APPEARANCE UR: CLEAR — SIGNIFICANT CHANGE UP
APTT BLD: 32 SEC — SIGNIFICANT CHANGE UP (ref 27.5–35.5)
AST SERPL-CCNC: 18 U/L — SIGNIFICANT CHANGE UP (ref 10–40)
BACTERIA # UR AUTO: 0 — SIGNIFICANT CHANGE UP
BASE EXCESS BLDV CALC-SCNC: 1 MMOL/L — SIGNIFICANT CHANGE UP (ref -2–2)
BASE EXCESS BLDV CALC-SCNC: 1.7 MMOL/L — SIGNIFICANT CHANGE UP (ref -2–2)
BASOPHILS # BLD AUTO: 0.05 K/UL — SIGNIFICANT CHANGE UP (ref 0–0.2)
BASOPHILS NFR BLD AUTO: 0.6 % — SIGNIFICANT CHANGE UP (ref 0–2)
BILIRUB SERPL-MCNC: 0.6 MG/DL — SIGNIFICANT CHANGE UP (ref 0.2–1.2)
BILIRUB UR-MCNC: NEGATIVE — SIGNIFICANT CHANGE UP
BUN SERPL-MCNC: 14 MG/DL — SIGNIFICANT CHANGE UP (ref 7–23)
CA-I SERPL-SCNC: 1.12 MMOL/L — SIGNIFICANT CHANGE UP (ref 1.12–1.3)
CA-I SERPL-SCNC: 1.21 MMOL/L — SIGNIFICANT CHANGE UP (ref 1.12–1.3)
CALCIUM SERPL-MCNC: 10 MG/DL — SIGNIFICANT CHANGE UP (ref 8.4–10.5)
CHLORIDE BLDV-SCNC: 105 MMOL/L — SIGNIFICANT CHANGE UP (ref 96–108)
CHLORIDE BLDV-SCNC: 108 MMOL/L — SIGNIFICANT CHANGE UP (ref 96–108)
CHLORIDE SERPL-SCNC: 103 MMOL/L — SIGNIFICANT CHANGE UP (ref 96–108)
CO2 BLDV-SCNC: 28 MMOL/L — SIGNIFICANT CHANGE UP (ref 22–30)
CO2 BLDV-SCNC: 29 MMOL/L — SIGNIFICANT CHANGE UP (ref 22–30)
CO2 SERPL-SCNC: 24 MMOL/L — SIGNIFICANT CHANGE UP (ref 22–31)
COLOR SPEC: SIGNIFICANT CHANGE UP
CREAT SERPL-MCNC: 1.25 MG/DL — SIGNIFICANT CHANGE UP (ref 0.5–1.3)
DIFF PNL FLD: ABNORMAL
EOSINOPHIL # BLD AUTO: 0.13 K/UL — SIGNIFICANT CHANGE UP (ref 0–0.5)
EOSINOPHIL NFR BLD AUTO: 1.5 % — SIGNIFICANT CHANGE UP (ref 0–6)
EPI CELLS # UR: 0 /HPF — SIGNIFICANT CHANGE UP
GAS PNL BLDV: 135 MMOL/L — SIGNIFICANT CHANGE UP (ref 135–145)
GAS PNL BLDV: 138 MMOL/L — SIGNIFICANT CHANGE UP (ref 135–145)
GAS PNL BLDV: SIGNIFICANT CHANGE UP
GAS PNL BLDV: SIGNIFICANT CHANGE UP
GLUCOSE BLDV-MCNC: 82 MG/DL — SIGNIFICANT CHANGE UP (ref 70–99)
GLUCOSE BLDV-MCNC: 97 MG/DL — SIGNIFICANT CHANGE UP (ref 70–99)
GLUCOSE SERPL-MCNC: 98 MG/DL — SIGNIFICANT CHANGE UP (ref 70–99)
GLUCOSE UR QL: NEGATIVE — SIGNIFICANT CHANGE UP
HCO3 BLDV-SCNC: 26 MMOL/L — SIGNIFICANT CHANGE UP (ref 21–29)
HCO3 BLDV-SCNC: 27 MMOL/L — SIGNIFICANT CHANGE UP (ref 21–29)
HCT VFR BLD CALC: 43.7 % — SIGNIFICANT CHANGE UP (ref 39–50)
HCT VFR BLDA CALC: 40 % — SIGNIFICANT CHANGE UP (ref 39–50)
HCT VFR BLDA CALC: 49 % — SIGNIFICANT CHANGE UP (ref 39–50)
HGB BLD CALC-MCNC: 13 G/DL — SIGNIFICANT CHANGE UP (ref 13–17)
HGB BLD CALC-MCNC: 16.1 G/DL — SIGNIFICANT CHANGE UP (ref 13–17)
HGB BLD-MCNC: 14.9 G/DL — SIGNIFICANT CHANGE UP (ref 13–17)
HYALINE CASTS # UR AUTO: 0 /LPF — SIGNIFICANT CHANGE UP (ref 0–2)
IMM GRANULOCYTES NFR BLD AUTO: 0.1 % — SIGNIFICANT CHANGE UP (ref 0–1.5)
INR BLD: 0.96 RATIO — SIGNIFICANT CHANGE UP (ref 0.88–1.16)
KETONES UR-MCNC: NEGATIVE — SIGNIFICANT CHANGE UP
LACTATE BLDV-MCNC: 1.6 MMOL/L — SIGNIFICANT CHANGE UP (ref 0.7–2)
LACTATE BLDV-MCNC: 2.7 MMOL/L — HIGH (ref 0.7–2)
LEUKOCYTE ESTERASE UR-ACNC: NEGATIVE — SIGNIFICANT CHANGE UP
LIDOCAIN IGE QN: 19 U/L — SIGNIFICANT CHANGE UP (ref 7–60)
LYMPHOCYTES # BLD AUTO: 1.54 K/UL — SIGNIFICANT CHANGE UP (ref 1–3.3)
LYMPHOCYTES # BLD AUTO: 18.1 % — SIGNIFICANT CHANGE UP (ref 13–44)
MCHC RBC-ENTMCNC: 30.5 PG — SIGNIFICANT CHANGE UP (ref 27–34)
MCHC RBC-ENTMCNC: 34.1 GM/DL — SIGNIFICANT CHANGE UP (ref 32–36)
MCV RBC AUTO: 89.5 FL — SIGNIFICANT CHANGE UP (ref 80–100)
MONOCYTES # BLD AUTO: 0.93 K/UL — HIGH (ref 0–0.9)
MONOCYTES NFR BLD AUTO: 10.9 % — SIGNIFICANT CHANGE UP (ref 2–14)
NEUTROPHILS # BLD AUTO: 5.85 K/UL — SIGNIFICANT CHANGE UP (ref 1.8–7.4)
NEUTROPHILS NFR BLD AUTO: 68.8 % — SIGNIFICANT CHANGE UP (ref 43–77)
NITRITE UR-MCNC: NEGATIVE — SIGNIFICANT CHANGE UP
NRBC # BLD: 0 /100 WBCS — SIGNIFICANT CHANGE UP (ref 0–0)
OTHER CELLS CSF MANUAL: 10 ML/DL — LOW (ref 18–22)
PCO2 BLDV: 48 MMHG — SIGNIFICANT CHANGE UP (ref 35–50)
PCO2 BLDV: 49 MMHG — SIGNIFICANT CHANGE UP (ref 35–50)
PH BLDV: 7.36 — SIGNIFICANT CHANGE UP (ref 7.35–7.45)
PH BLDV: 7.37 — SIGNIFICANT CHANGE UP (ref 7.35–7.45)
PH UR: 6 — SIGNIFICANT CHANGE UP (ref 5–8)
PLATELET # BLD AUTO: 178 K/UL — SIGNIFICANT CHANGE UP (ref 150–400)
PO2 BLDV: 27 MMHG — SIGNIFICANT CHANGE UP (ref 25–45)
PO2 BLDV: 31 MMHG — SIGNIFICANT CHANGE UP (ref 25–45)
POTASSIUM BLDV-SCNC: 3.8 MMOL/L — SIGNIFICANT CHANGE UP (ref 3.5–5.3)
POTASSIUM BLDV-SCNC: 4.4 MMOL/L — SIGNIFICANT CHANGE UP (ref 3.5–5.3)
POTASSIUM SERPL-MCNC: 3.9 MMOL/L — SIGNIFICANT CHANGE UP (ref 3.5–5.3)
POTASSIUM SERPL-SCNC: 3.9 MMOL/L — SIGNIFICANT CHANGE UP (ref 3.5–5.3)
PROT SERPL-MCNC: 7 G/DL — SIGNIFICANT CHANGE UP (ref 6–8.3)
PROT UR-MCNC: NEGATIVE — SIGNIFICANT CHANGE UP
PROTHROM AB SERPL-ACNC: 11.5 SEC — SIGNIFICANT CHANGE UP (ref 10.6–13.6)
RBC # BLD: 4.88 M/UL — SIGNIFICANT CHANGE UP (ref 4.2–5.8)
RBC # FLD: 12.5 % — SIGNIFICANT CHANGE UP (ref 10.3–14.5)
RBC CASTS # UR COMP ASSIST: 44 /HPF — HIGH (ref 0–4)
SAO2 % BLDV: 47 % — LOW (ref 67–88)
SAO2 % BLDV: 56 % — LOW (ref 67–88)
SARS-COV-2 RNA SPEC QL NAA+PROBE: SIGNIFICANT CHANGE UP
SODIUM SERPL-SCNC: 140 MMOL/L — SIGNIFICANT CHANGE UP (ref 135–145)
SP GR SPEC: 1.03 — HIGH (ref 1.01–1.02)
UROBILINOGEN FLD QL: NEGATIVE — SIGNIFICANT CHANGE UP
WBC # BLD: 8.51 K/UL — SIGNIFICANT CHANGE UP (ref 3.8–10.5)
WBC # FLD AUTO: 8.51 K/UL — SIGNIFICANT CHANGE UP (ref 3.8–10.5)
WBC UR QL: 1 /HPF — SIGNIFICANT CHANGE UP (ref 0–5)

## 2021-01-03 PROCEDURE — 96374 THER/PROPH/DIAG INJ IV PUSH: CPT | Mod: XU

## 2021-01-03 PROCEDURE — 84295 ASSAY OF SERUM SODIUM: CPT

## 2021-01-03 PROCEDURE — 83690 ASSAY OF LIPASE: CPT

## 2021-01-03 PROCEDURE — 87086 URINE CULTURE/COLONY COUNT: CPT

## 2021-01-03 PROCEDURE — 85730 THROMBOPLASTIN TIME PARTIAL: CPT

## 2021-01-03 PROCEDURE — U0005: CPT

## 2021-01-03 PROCEDURE — 74177 CT ABD & PELVIS W/CONTRAST: CPT | Mod: 26

## 2021-01-03 PROCEDURE — 93005 ELECTROCARDIOGRAM TRACING: CPT

## 2021-01-03 PROCEDURE — 85025 COMPLETE CBC W/AUTO DIFF WBC: CPT

## 2021-01-03 PROCEDURE — 82435 ASSAY OF BLOOD CHLORIDE: CPT

## 2021-01-03 PROCEDURE — 81001 URINALYSIS AUTO W/SCOPE: CPT

## 2021-01-03 PROCEDURE — 82947 ASSAY GLUCOSE BLOOD QUANT: CPT

## 2021-01-03 PROCEDURE — 85014 HEMATOCRIT: CPT

## 2021-01-03 PROCEDURE — 71045 X-RAY EXAM CHEST 1 VIEW: CPT

## 2021-01-03 PROCEDURE — 99285 EMERGENCY DEPT VISIT HI MDM: CPT

## 2021-01-03 PROCEDURE — 82330 ASSAY OF CALCIUM: CPT

## 2021-01-03 PROCEDURE — 83605 ASSAY OF LACTIC ACID: CPT

## 2021-01-03 PROCEDURE — 87635 SARS-COV-2 COVID-19 AMP PRB: CPT

## 2021-01-03 PROCEDURE — 85018 HEMOGLOBIN: CPT

## 2021-01-03 PROCEDURE — 84132 ASSAY OF SERUM POTASSIUM: CPT

## 2021-01-03 PROCEDURE — 80053 COMPREHEN METABOLIC PANEL: CPT

## 2021-01-03 PROCEDURE — 82803 BLOOD GASES ANY COMBINATION: CPT

## 2021-01-03 PROCEDURE — 71045 X-RAY EXAM CHEST 1 VIEW: CPT | Mod: 26

## 2021-01-03 PROCEDURE — 74177 CT ABD & PELVIS W/CONTRAST: CPT

## 2021-01-03 PROCEDURE — 99284 EMERGENCY DEPT VISIT MOD MDM: CPT | Mod: 25

## 2021-01-03 PROCEDURE — 85610 PROTHROMBIN TIME: CPT

## 2021-01-03 RX ORDER — ONDANSETRON 8 MG/1
1 TABLET, FILM COATED ORAL
Qty: 9 | Refills: 0
Start: 2021-01-03 | End: 2021-01-05

## 2021-01-03 RX ORDER — TAMSULOSIN HYDROCHLORIDE 0.4 MG/1
0.4 CAPSULE ORAL ONCE
Refills: 0 | Status: COMPLETED | OUTPATIENT
Start: 2021-01-03 | End: 2021-01-03

## 2021-01-03 RX ORDER — ACETAMINOPHEN 500 MG
975 TABLET ORAL ONCE
Refills: 0 | Status: COMPLETED | OUTPATIENT
Start: 2021-01-03 | End: 2021-01-03

## 2021-01-03 RX ORDER — KETOROLAC TROMETHAMINE 30 MG/ML
15 SYRINGE (ML) INJECTION ONCE
Refills: 0 | Status: DISCONTINUED | OUTPATIENT
Start: 2021-01-03 | End: 2021-01-03

## 2021-01-03 RX ORDER — SODIUM CHLORIDE 9 MG/ML
1000 INJECTION INTRAMUSCULAR; INTRAVENOUS; SUBCUTANEOUS ONCE
Refills: 0 | Status: COMPLETED | OUTPATIENT
Start: 2021-01-03 | End: 2021-01-03

## 2021-01-03 RX ORDER — SODIUM CHLORIDE 9 MG/ML
500 INJECTION INTRAMUSCULAR; INTRAVENOUS; SUBCUTANEOUS ONCE
Refills: 0 | Status: COMPLETED | OUTPATIENT
Start: 2021-01-03 | End: 2021-01-03

## 2021-01-03 RX ORDER — OXYCODONE HYDROCHLORIDE 5 MG/1
1 TABLET ORAL
Qty: 9 | Refills: 0
Start: 2021-01-03 | End: 2021-01-05

## 2021-01-03 RX ADMIN — SODIUM CHLORIDE 500 MILLILITER(S): 9 INJECTION INTRAMUSCULAR; INTRAVENOUS; SUBCUTANEOUS at 12:34

## 2021-01-03 RX ADMIN — Medication 975 MILLIGRAM(S): at 12:34

## 2021-01-03 RX ADMIN — Medication 15 MILLIGRAM(S): at 15:13

## 2021-01-03 RX ADMIN — TAMSULOSIN HYDROCHLORIDE 0.4 MILLIGRAM(S): 0.4 CAPSULE ORAL at 15:13

## 2021-01-03 RX ADMIN — SODIUM CHLORIDE 1000 MILLILITER(S): 9 INJECTION INTRAMUSCULAR; INTRAVENOUS; SUBCUTANEOUS at 13:27

## 2021-01-03 NOTE — ED PROVIDER NOTE - NSFOLLOWUPINSTRUCTIONS_ED_ALL_ED_FT
You were seen in the Emergency Department for a Kidney stone. Your stone is 5mm is size on the L side. Please drink water as needed. You can take ibuprofen 400mg every 4hrs for pain. We will also prescribe you a stronger pain medicine to take only if you need it. We will also prescribe you some zofran for nausea if you experience it. Please follow up with your urologist and primary care doctor.      1) Advance activity as tolerated.   2) Continue all previously prescribed medications as directed.    3) Follow up with your primary care physician in 24-48 hours - take copies of your results.    4) Return to the Emergency Department for worsening or persistent symptoms, and/or ANY NEW OR CONCERNING SYMPTOMS.

## 2021-01-03 NOTE — ED PROVIDER NOTE - ATTENDING CONTRIBUTION TO CARE
Private Physician Sreedhar, PCP, [facility]   [place] [id number] Jelena  65y male  [place] speaking male PMH renal colic  MI, CAD with multiple stents, DMT2 , HTN, HLD, GERD, Gl bleed presented w c/o pain rt flank mod severe followed by blood in urine. with Sand, No hx of renal stone. Onset 5d ago. Pt presented today for eval. Delayed visit out of fear of hospital aquired covid.  PE WDNW male looking mildly unconfortable. Heent normocephalic atraumatic neck supple chest clear anterior & posterior cv no rubs, gallops or murmurs neuro no focal defects, Abd mild left cvat, gu no ttp.swelling,deformity  Kailash Berman MD, [facility]

## 2021-01-03 NOTE — ED PROVIDER NOTE - PHYSICAL EXAMINATION
General: non-toxic, NAD  HEENT: NCAT  Cardiac: RRR, no murmurs, 2+ peripheral pulses  Chest: CTA  Abdomen: soft, non-distended, bowel sounds present, +ttp w/ guarding in L abdomen -CVA -suprapubic ttp  Back: +ttp at L SI joint -midline ttp  Extremities: no peripheral edema  Skin: no rashes  Neuro: AAOx4, 5+ motor and sensory grossly intact  Psych: mood and affect appropriate

## 2021-01-03 NOTE — ED ADULT NURSE NOTE - NSIMPLEMENTINTERV_GEN_ALL_ED
Implemented All Universal Safety Interventions:  [place] to call system. Call bell, personal items and telephone within reach. Instruct patient to call for assistance. Room bathroom lighting operational. Non-slip footwear when patient is off stretcher. Physically safe environment: no spills, clutter or unnecessary equipment. Stretcher in lowest position, wheels locked, appropriate side rails in place.

## 2021-01-03 NOTE — PROCEDURE NOTE - ADDITIONAL PROCEDURE DETAILS
Emergency Department Focused Ultrasound performed at patient's bedside for educational purposes. The study will have a follow up study performed or was performed in the direct supervision of an ultrasound trained attending.    Findings:  - 150cc volume in bladder  - R kidney w/o hydro  - L kidney with multiple cortical cysts      F/U CT abdomen pelvis

## 2021-01-03 NOTE — ED PROVIDER NOTE - CLINICAL SUMMARY MEDICAL DECISION MAKING FREE TEXT BOX
Pt w/ abdominal pain in the L for 5 days starting in the flank. VSS. Exam w/ L abdominal ttp and SI joint pain. Concern for renal stone given hx of having them, also concern for diverticular pain. Labs, CT, bladder scan, fluids, tylenol, reassess.

## 2021-01-03 NOTE — ED PROVIDER NOTE - PATIENT PORTAL LINK FT
You can access the FollowMyHealth Patient Portal offered by [facility] by registering at the following website: [email or web address]. By joining [facility]’s FollowMyHealth portal, you will also be able to view your health information using other applications (apps) compatible with our system.

## 2021-01-03 NOTE — PROCEDURE NOTE - SUPERVISORY STATEMENT
immediately available for procedure assistance  Kailash Berman MD, [facility]

## 2021-01-03 NOTE — ED PROVIDER NOTE - OBJECTIVE STATEMENT
66yo M hx of CAD s/p stents, HTN, HLD, DM comes to ED for abdominal pain. States L side into the flank, he has had this for 5 days, gradually worsening, now affecting ambulation, responsive mildly to ibuprofen. He denies bloody or melanotic stools, last BM yesterday. Says he has a hard time producing urine and has only had small volumes for a while. Not a dialysis pt. Had an episode of small blood w/ clots in urine 2 days ago, since has resolved. Back pain is to the L low back, no acute triggering event, no trauma or falls. No bowel incontinence or saddle anesthesia. Denies f/c, vomiting, cp, sob. Only abdominal surgery is gastric bypass.

## 2021-01-03 NOTE — ED PROVIDER NOTE - NS ED ROS FT
Constitutional: no fevers, chills  HEENT: no cough, rhinorrhea  Cardiac: no chest pain, palpitations  Respiratory: no SOB  GI: + abdominal pain  : no dysuria, frequency, or hematuria  MSK: no joint pain +back pain  Skin: no rashes  Neuro: no headache, change in vision, weakness  Psych: negative

## 2021-01-03 NOTE — ED ADULT NURSE NOTE - OBJECTIVE STATEMENT
Patient is a 65 yr old male with a PMH of gastric sleeve/stent/MI who presents to the ED from home complaining of hematuria. Per patient he has been having problems urinating with left sided flank pain for 5 days and didn't come in to ER because it was the holidays and was scared of covid. Patient endorses having bloody urine and only being able to urinate small amounts. Upon assessment, patient is AOx4, afebrile, breathing spontaneously on RA, abdomen soft/non tender, +left flank pain, skin intact, +pulses and denies n/v/d/f/chills/SOB/CP/headache/cough. Provider assessed at bedside, heplock placed with labs drawn/sent, meds ordered given, call bell at bedside and will continue to monitor.

## 2021-01-03 NOTE — ED PROVIDER NOTE - PROGRESS NOTE DETAILS
Endorsed to Dr MATTIE Berman MD, [facility] NOEL Woodruff (Resident) - CT showing 5mm stone on L. Pt feeling better after urinating. Will dc with pain meds.

## 2021-01-04 LAB
CULTURE RESULTS: NO GROWTH — SIGNIFICANT CHANGE UP
SPECIMEN SOURCE: SIGNIFICANT CHANGE UP

## 2021-01-06 NOTE — DISCHARGE NOTE ADULT - WEIGHT IN KG
You may be receiving a patient experience survey by mail or e-mail from the Pediatric Staff regarding your visit today.  We value your feedback and hope you can provide us your insight.  Thank you,  Dr. Joseph and Kayleen, CMA      Your Child's Health  Nine-Month-Old Visit    Toni Timmons  January 8, 2021             Visit Vitals  Pulse 138   Temp 97.6 °F (36.4 °C) (Axillary)   Ht 29.25\" (74.3 cm)   Wt 9.823 kg   HC 46.5 cm (18.31\")   BMI 17.80 kg/m²     Weight:     YOUR CHILD'S 9 MONTH-OLD VISIT      Key points at this age…  • Car seat safety is critical! Make sure your baby is properly secured in a rear-facing car seat.    • Continue to breast feed your baby at this age if you are able. If breastfeeding, you will need to make sure they are getting some extra iron by this age.     • Thinking about safety in your home is essential as your baby will be rolling, scooting and crawling in the next few months.      NUTRITION  Your baby will start doing a lot more finger feeding now. You can offer them nearly any type of table food, as long as it is well-cooked and soft. Try new textures (pureed, mashed, soft lumps) but continue to avoid anything that is hard, chunky, chewy or sticky (grapes, popcorn, nuts, hot dogs, raw carrots or celery) which your baby could choke on. Continue to avoid honey (which has been associated with a very rare but dangerous infection) until one year of age.       Remember that learning to eat is a learning process--your baby may need to try a food several times before accepting it. Refusal or rejection of a food does not mean that your baby will never eat it again. So keep trying, avoid distractions (like TV) during feeding and be patient!    Continue formula or breast milk; regular cow’s milk should not be given until 12 months of age. However, start working on getting them off of the bottle (completely!) by one year. Give them a sippy cup more often and put smaller amounts of formula in their  bottles. Keep them on vitamin D or a multivitamin with iron supplement if they are still getting primarily breast milk.     Juice is not recommended for babies this age. Avoid it because it has lots of sugar that is bad for your baby’s teeth and may put them at risk for becoming overweight. Avoid other sweet drinks (Phill-Aid®, fruit drinks, sodas) and unhealthy snacks or “junk food” as well. While it’s tempting to offer French fries or a bite of chips or candy (and your child will probably like them!), most of those foods are choking hazards and, more importantly, you are starting very unhealthy eating habits very early in life. Start your child on a healthy course by only offering healthy foods at this young age!           DEVELOPMENT & BEHAVIOR   Your baby may already be or will soon be crawling, pulling themselves up and taking steps while holding onto things. There are lots of different styles of crawling, and some babies don’t crawl at all--they may scoot or roll to get themselves from place to place. They are gaining more control over their hands and fingers and will  the tiniest little things (and most likely put it in their mouth!). They also are starting to realize that when you put something out of their sight, that ‘something’ is still there. So be extra careful to keep things that could be dangerous (e.g., things that are sharp, hot, toxic or small enough to choke on, especially button batteries) well out of their reach.      Babies are very vocal at this age. They will start repeating sounds (“baba”, “ivan”, “mama”). This is a great age to encourage their developing language skills by naming things that you are looking at together, like their own body parts and pictures in books. Reading books, in fact, is one of the best things to do with your baby at this age. Early reading is a wonderful parent-child bonding opportunity, plus it has been scientifically shown to promote a child’s brain  development, language and reading skills.     Television and videos (even ones labelled as “educational”) do not help infants with their language, learning skills or future school performance--this has been proven. Babies may “like” watching screens, but it doesn’t help them. Talk, play back and forth games (peek-a-calabrese, Memvuke), sing songs with hand motions and look at books instead. (And remember to put your own phone down--your baby doesn’t learn if you are not interacting with them.)    Believe it or not, it is time to start thinking about disciplining your baby. The word “discipline” means “to teach”--parents and caregivers “teach” their children how to behave well. At this age, when you let your child know that you approve of their behavior by smiling and speaking kindly, they are learning how to get a positive response out of you. Try to avoid using the word “no” as much as you can. That’s an easy word for them to learn to say back to you, plus it only tells them what you do not want them to do--it doesn’t really teach them a better option. Use positive language when you can: say “let’s not do this” followed by “let’s do this” and “time to sit” rather than “don’t stand”. Save “no” for situations when your baby might be getting in harm’s way. (It will mean more if you don’t say it all the time.) It is also important to be very consistent in how you respond to your baby. If one caregiver does not allow the baby to do one thing, but another caregiver does, the baby is getting a very mixed message--and will likely lead to behavioral problems later on.     If you haven’t already established a regular bedtime routine, this is a good time to do it. Bedtime routines help babies regulate their sleeping patterns. Babies who have been sleeping through the night may start waking up at night again--this is because they realize you are not there! Console them with as little interaction as possible so they don’t start  expecting a fun, interactive late night visit.     TEETHING & DENTAL CARE  Teething babies may experience drooling, swollen gums, crabbiness, or changes in their eating habits; some have no symptoms. The American Academy of Pediatric Dentists recommends cleaning with a small amount (the size of a grain of rice!) of regular (fluoridated) toothpaste as soon as teeth erupt--you can use a washcloth, gauze, or soft toothbrush. Do not share spoons or clean off pacifiers in your mouth--this transfers your germs to your baby and increases their risk of cavities. Fluoride is very important for protection from cavities. Make sure your baby gets some tap water (in their formula or from cooking) to provide it. If you have well water, you should have it tested for fluoride content to determine whether your child might need fluoride supplements.     SAFETY & ACCIDENT PREVENTION  Remember, your baby is cannot really “learn rules” at this age, so try to make your home as safe as possible so you are not always redirecting them away from things they should not be touching.     1. Car Safety:  A rear-facing car seat in the backseat is required by [place] law until 12 months of age. When you replace the infant car seat, buy a convertible car seat so you can keep your baby rear-facing for as long as possible; they are simply safer in a rear-facing position.    2. Burns & Electrical Shocks:  Hot liquids, hot foods, and electrical cords must be kept out of reach. Use outlet protectors and hide extension cords. Your water heater should be set at 120-125°F to prevent scald burns. Keep hot items (irons, curling irons, cookware) out of baby’s reach and make sure they cannot reach pot handles on the stove or electrical cords of hot things (to prevent them from pulling them down on themselves). Make sure you have working smoke and carbon monoxide detectors in your home.   3. Falls: Block stairs with parker. If you keep windows open, use window  locks or guards, especially on upstairs windows. Your baby should not be using a walker--at this age, they often lead to accidents (and they do not help them walk). And never leave them alone on a high surface--they will find a way to scoot or roll their way off of it!  4. Water Safety:  Children can drown in just a couple inches of water, including tubs, play pools, buckets and toilets. Never leave an infant or toddler alone in a tub and do not rely on older children to properly supervise the younger ones. An adult should always be within arm’s reach whenever a young child is in or around water. Permanent pools (in ground and above ground) should be fenced off, and wading pools should be drained when not in use. Keep toilet seat lids down and secured with a safety lock if possible.   5. Poisons and Choking Hazards:  Make sure products like , chemicals and medicines are locked up or stored up high. Keep any small object that your baby would be tempted to put into their mouth (and choke on!) out of reach.  Be aware of what you keep under your bathroom sink and in your laundry room (colorful detergent pods are very tempting to young ones!), as well as what you keep under the kitchen sink.   Make sure purses (belonging to you or any visitors) are out of reach; curious crawling babies can quickly find medicines, cigarettes, and small objects to ingest and choke on.     For all medicines, including vitamins, keep the original safety caps that came with the bottle; do not transfer medicines to non-child-proofed or unlabeled containers. Be especially careful when around older people because they may keep their medicines out in the open or in non-childproofed containers.   Call the Poison Center at 1-233.176.2828 for any known or suspected poisoning (it’s a good idea to put this phone number in your phone for quick reference!).     Magnets and button batteries, in particular, are VERY dangerous if swallowed. In  addition to causing dangerous choking spells, they can cause severe internal damage.    6. Smoke Exposure: Do not let anyone smoke around your baby in the house OR in the car. If you smoke and are ready to consider quitting, talk to your doctor. Nicotine replacement products can be very helpful in breaking this tough addiction.     7. Sun Exposure: Keep your baby in the shade and try to protect them from direct sun as much as possible. Use protective clothing (including hats and sunglasses) when you are out. At this age infant sunscreens (always choose one with an SPF of 15 or higher) are safe. Apply carefully according to directions and always apply at least 20 to 30 minutes before going out in the sun.   8. Violence: Violence in the home can have negative effects on a child’s physical and emotional well-being, even at this young age. If you don’t feel safe in your home or you or your children are threatened with physical violence (hitting, kicking, shoving, etc), it is important to find ways to ensure a safer environment. Talk to your doctors or a . In [place], resources include [facility] (964-957-0501) and the [facility] (24 hour hotline is 207- 071-6783); the National Domestic Violence Hotline is 7-603-151-FDTL (0675). If you are pregnant or have a child less than one year old and are experiencing domestic violence in [place], call Safe Mom, Safe Baby at 060-098-4711.     9. Lead Exposure: Babies at this age are more at risk for lead poisoning because they are moving around on the floor and putting everything into their mouths. Lead poisoning can have a harmful and irreversible effect on your child’s behavior, intelligence and learning potential, so it is very important to prevent and screen for lead exposure. Let us know if any of the following apply:  1.  Your home (or any place that your baby spends time) was built before 1978 and has peeling or chipping paint.  If you live in an older home, find out if it has lead pipes.  2.  There is another child in your home being followed or treated for a high lead level.   3.  Someone in your home (or another caretaker for Toni) has a job or hobby involving lead (soldering, battery plants, recycling, casting, stained glass, etc.). Lead can also be found in pottery, pewter, and folk medicines.     Read this if you live in an older home in [place]:  Doctors and dentists recommend children drink the city water because it contains fluoride which is proven to help fight and prevent cavities. News reports in 2016 raised concerns about a risk of lead in the city water supply. The [place] water supply is very safe--there is no lead in the water that leaves the local water treatment center, and this is tested regularly. In some areas of [place], there may be lead in the service pipe lines (which carry water from the main water pipe in the street to individual homes). When water sits in these pipes for prolonged periods, some lead may dissolve into the water. As a precaution, the [facility] recommends a water filter at the tap of homes which have lead service lines. Houses built before 1951 are the only ones likely to have lead service lines. If you are not sure when your home was built, do an internet search for \"[place] lead awareness and drinking water safety\". From this web page, you can search for your address to find out if your home was built with lead service lines. There are also helpful hints regarding water safety on this site. Another option is to call the Customer Service line at [phone].    MEDICATION FOR FEVER OR PAIN:   Acetaminophen liquid (e.g., Tylenol or Tempra) may be given every four hours as needed for pain or fever.  Be sure to check which product CONCENTRATION you are using.    INFANT/CHILDREN’S Tylenol/Acetaminophen  (160 MG/5 mL)  Child’s Weight: Dose:  12 - 17 pounds:   80 mg (2.5 mL  (1/2  Teaspoon))  18 - 23 pounds:   120 mg (3.75 mL (3/4 Teaspoon))    INFANT Ibuprofen (50 mg/1.25 ml)  liquid (for example Advil or Motrin) may be given every 6 hours as needed for pain or fever.    Child’s Weight: Dose:  12 - 17 pounds:           50 mg (1.25 mL)    18 - 23 pounds:           75 mg (1.875 mL)    CHILDREN'S Ibuprofen (100 mg/5 mL) liquid (for example Advil or Motrin) may be given every 6 hours as needed for pain or fever.    Child’s Weight: Dose:  12 - 17 pounds:           50 mg (2.5 mL (1/2 Teaspoon))  18 - 23 pounds:           75 mg (3.75  mL (3/4 Teaspoon))    If Toni is outside these weight ranges, call your pediatrician's office for advice.    Most Recent Immunizations   Administered Date(s) Administered   • DTaP/Hep B/IPV 2020   • Hep B, adolescent or pediatric 2020   • Hib (PRP-OMP) 2020   • Influenza, injectable, quadrivalent, preservative-free 2020   • Pneumococcal Conjugate 13 valent 2020   • Rotavirus - pentavalent 2020       If Toni develops any of the following reactions within 72 hours after an immunization, notify your pediatrician by calling the pediatric phone nurse:  1. A temperature of 105 degrees or above.  2. More than 3 hours of continuous crying.  3. A shrill, high-pitched cry.  4. A pale, limp spell.  5. A seizure or fainting spell.  In this case, you should call 911 or go immediately to the emergency room.    NEXT VISIT: ONE YEAR OF AGE    NOTE:  Toni should have the One-Year-Old Exam after his first birthday.  The immunizations given at that visit must take place after Toni’s birthday in order to meet [facility] requirements.    Thank you for entrusting your care to [facility].      Also, check out “Children’s Health” on the [facility] Blog for updates on timely topics regarding children’s health!Patient Education   Home  Back    Dermatitis De Contacto [Bebés] (Contact Dermatitis, Infant/Toddler)  Chung bebé tiene  un trastorno llamado dermatitis de contacto. Se trata de eileen erupción cutánea debida a alguna sustancia que irrita e inflama la piel.  La piel afectada estará john, hinchada, reseca y posiblemente agrietada. Pueden formarse ampollas con supuración, y la dermatitis causará picazón. La dermatitis de contacto suele ocurrir en la melvin, el raul, el dorso de las roxanne, los antebrazos, los genitales y las piernas (entre las rodillas y los pies). Los bebés pueden desarrollar dermatitis por muchas causas, entre ellas la exposición a animales, jabones, detergentes o plantas (veena el zumaque venenoso) o incluso los pañales.  El tratamiento se basa en el alivio de la comezón, dejar de rascarse y prevenir que la erupción reaparezca. La mayoría de los casos se resuelven a los pocos días o semanas.  Cuidados En [place]:  Medicamentos : Song médico podría recetarle medicamentos para reducir la hinchazón y aliviar la comezón. Siga las instrucciones del médico al usar estos medicamentos.  Atención General:   1. Siga las instrucciones del médico sobre el cuidado de la erupción cutánea del abby.  2. Bañe al baby en agua tibia (no muy caliente) con agua y jabón suave. Aplíquele eileen pomada o crema (veena vaselina o Eucerin) después del baño.  3. Hable con el médico para tratar de determinar lo que puede carolyn causado la erupción cutánea del abby. Es posible que sea necesario un período de pruebas y tanteos hasta llegar a identificar la causa de problema para poder eliminarla. De todas formas, en algunos casos, la causa de la dermatitis nunca llega a identificarse.  4. Exponga al aire la piel ligeramente irritada para que se seque completamente. No use un secador de pelo, ya que el calor podría quemar la piel.  5. Intente impedir que el abby se rasque la sharan irritada, ya que esto retardará song curación. Puede ser útil cubrir las roxanne del bebé con guantes o mitones para evitar que se rasque. Para reducir la comezón, intente aplicar  compresas húmedas a la sharan afectada.  6. Vigile la piel del abby todos los días para tristan si muestra señales persistentes de irritación (salpullido) o infección (brie la información que se presenta más abajo).  Bree eileen VISITA DE CONTROL según le indique el médico o el personal del centro. Póngase en contacto con el médico del abby si el salpullido no mejora al cabo de dos semanas.  Nota Especial Para Los Padres:  Lávese jalil las roxanne con Viejas y jabón antes y después de atender al abby.  Obtenga Atención Médica Inmediata  en cualquiera de los siguientes casos:  · Fiebre superior a 100.4°F [38°C] (oral/rectal)  · Señales de infección, veena enrojecimiento, hinchazón, dolor o supuración maloliente de la sharan afectada  © [id number] Omar [address], [place] [address]. Todos los derechos reservados. Esta información no pretende sustituir la atención médica profesional. Sólo song médico puede diagnosticar y tratar un problema de coy.          122.5

## 2021-01-16 DIAGNOSIS — Z01.818 ENCOUNTER FOR OTHER PREPROCEDURAL EXAMINATION: ICD-10-CM

## 2021-01-22 ENCOUNTER — APPOINTMENT (OUTPATIENT)
Dept: DISASTER EMERGENCY | Facility: CLINIC | Age: 66
End: 2021-01-22

## 2021-05-12 ENCOUNTER — INPATIENT (INPATIENT)
Facility: HOSPITAL | Age: 66
LOS: 0 days | Discharge: HOME | DRG: 287 | End: 2021-05-13
Attending: INTERNAL MEDICINE | Admitting: HOSPITALIST
Payer: MEDICAID

## 2021-05-12 ENCOUNTER — TRANSCRIPTION ENCOUNTER (OUTPATIENT)
Age: 66
End: 2021-05-12

## 2021-05-12 VITALS
HEIGHT: 71 IN | SYSTOLIC BLOOD PRESSURE: 138 MMHG | OXYGEN SATURATION: 99 % | TEMPERATURE: 98 F | HEART RATE: 69 BPM | WEIGHT: 220.02 LBS | DIASTOLIC BLOOD PRESSURE: 84 MMHG | RESPIRATION RATE: 16 BRPM

## 2021-05-12 DIAGNOSIS — N40.0 BENIGN PROSTATIC HYPERPLASIA WITHOUT LOWER URINARY TRACT SYMPTOMS: ICD-10-CM

## 2021-05-12 DIAGNOSIS — I20.0 UNSTABLE ANGINA: ICD-10-CM

## 2021-05-12 DIAGNOSIS — I20.9 ANGINA PECTORIS, UNSPECIFIED: ICD-10-CM

## 2021-05-12 DIAGNOSIS — I10 ESSENTIAL (PRIMARY) HYPERTENSION: ICD-10-CM

## 2021-05-12 DIAGNOSIS — E78.00 PURE HYPERCHOLESTEROLEMIA, UNSPECIFIED: ICD-10-CM

## 2021-05-12 DIAGNOSIS — Z95.5 PRESENCE OF CORONARY ANGIOPLASTY IMPLANT AND GRAFT: Chronic | ICD-10-CM

## 2021-05-12 DIAGNOSIS — Z98.84 BARIATRIC SURGERY STATUS: Chronic | ICD-10-CM

## 2021-05-12 DIAGNOSIS — E11.9 TYPE 2 DIABETES MELLITUS WITHOUT COMPLICATIONS: ICD-10-CM

## 2021-05-12 LAB
ALBUMIN SERPL ELPH-MCNC: 4.8 G/DL — SIGNIFICANT CHANGE UP (ref 3.3–5)
ALP SERPL-CCNC: 51 U/L — SIGNIFICANT CHANGE UP (ref 40–120)
ALT FLD-CCNC: 32 U/L — SIGNIFICANT CHANGE UP (ref 10–45)
ANION GAP SERPL CALC-SCNC: 15 MMOL/L — SIGNIFICANT CHANGE UP (ref 5–17)
APTT BLD: 36.1 SEC — HIGH (ref 27.5–35.5)
AST SERPL-CCNC: 27 U/L — SIGNIFICANT CHANGE UP (ref 10–40)
BASOPHILS # BLD AUTO: 0.06 K/UL — SIGNIFICANT CHANGE UP (ref 0–0.2)
BASOPHILS NFR BLD AUTO: 0.9 % — SIGNIFICANT CHANGE UP (ref 0–2)
BILIRUB SERPL-MCNC: 0.4 MG/DL — SIGNIFICANT CHANGE UP (ref 0.2–1.2)
BUN SERPL-MCNC: 14 MG/DL — SIGNIFICANT CHANGE UP (ref 7–23)
CALCIUM SERPL-MCNC: 9.7 MG/DL — SIGNIFICANT CHANGE UP (ref 8.4–10.5)
CHLORIDE SERPL-SCNC: 101 MMOL/L — SIGNIFICANT CHANGE UP (ref 96–108)
CK MB CFR SERPL CALC: 1.8 NG/ML — SIGNIFICANT CHANGE UP (ref 0–6.7)
CK SERPL-CCNC: 61 U/L — SIGNIFICANT CHANGE UP (ref 30–200)
CO2 SERPL-SCNC: 23 MMOL/L — SIGNIFICANT CHANGE UP (ref 22–31)
CREAT SERPL-MCNC: 0.91 MG/DL — SIGNIFICANT CHANGE UP (ref 0.5–1.3)
EOSINOPHIL # BLD AUTO: 0.13 K/UL — SIGNIFICANT CHANGE UP (ref 0–0.5)
EOSINOPHIL NFR BLD AUTO: 2 % — SIGNIFICANT CHANGE UP (ref 0–6)
GLUCOSE BLDC GLUCOMTR-MCNC: 115 MG/DL — HIGH (ref 70–99)
GLUCOSE BLDC GLUCOMTR-MCNC: 122 MG/DL — HIGH (ref 70–99)
GLUCOSE BLDC GLUCOMTR-MCNC: 138 MG/DL — HIGH (ref 70–99)
GLUCOSE SERPL-MCNC: 129 MG/DL — HIGH (ref 70–99)
HCT VFR BLD CALC: 46.1 % — SIGNIFICANT CHANGE UP (ref 39–50)
HGB BLD-MCNC: 15.3 G/DL — SIGNIFICANT CHANGE UP (ref 13–17)
IMM GRANULOCYTES NFR BLD AUTO: 0.3 % — SIGNIFICANT CHANGE UP (ref 0–1.5)
INR BLD: 0.95 RATIO — SIGNIFICANT CHANGE UP (ref 0.88–1.16)
LYMPHOCYTES # BLD AUTO: 2.02 K/UL — SIGNIFICANT CHANGE UP (ref 1–3.3)
LYMPHOCYTES # BLD AUTO: 31.7 % — SIGNIFICANT CHANGE UP (ref 13–44)
MAGNESIUM SERPL-MCNC: 1.7 MG/DL — SIGNIFICANT CHANGE UP (ref 1.6–2.6)
MCHC RBC-ENTMCNC: 30.3 PG — SIGNIFICANT CHANGE UP (ref 27–34)
MCHC RBC-ENTMCNC: 33.2 GM/DL — SIGNIFICANT CHANGE UP (ref 32–36)
MCV RBC AUTO: 91.3 FL — SIGNIFICANT CHANGE UP (ref 80–100)
MONOCYTES # BLD AUTO: 0.53 K/UL — SIGNIFICANT CHANGE UP (ref 0–0.9)
MONOCYTES NFR BLD AUTO: 8.3 % — SIGNIFICANT CHANGE UP (ref 2–14)
NEUTROPHILS # BLD AUTO: 3.61 K/UL — SIGNIFICANT CHANGE UP (ref 1.8–7.4)
NEUTROPHILS NFR BLD AUTO: 56.8 % — SIGNIFICANT CHANGE UP (ref 43–77)
NRBC # BLD: 0 /100 WBCS — SIGNIFICANT CHANGE UP (ref 0–0)
NT-PROBNP SERPL-SCNC: 100 PG/ML — SIGNIFICANT CHANGE UP (ref 0–300)
PLATELET # BLD AUTO: 192 K/UL — SIGNIFICANT CHANGE UP (ref 150–400)
POTASSIUM SERPL-MCNC: 4.1 MMOL/L — SIGNIFICANT CHANGE UP (ref 3.5–5.3)
POTASSIUM SERPL-SCNC: 4.1 MMOL/L — SIGNIFICANT CHANGE UP (ref 3.5–5.3)
PROT SERPL-MCNC: 7.6 G/DL — SIGNIFICANT CHANGE UP (ref 6–8.3)
PROTHROM AB SERPL-ACNC: 11.4 SEC — SIGNIFICANT CHANGE UP (ref 10.6–13.6)
RBC # BLD: 5.05 M/UL — SIGNIFICANT CHANGE UP (ref 4.2–5.8)
RBC # FLD: 12.3 % — SIGNIFICANT CHANGE UP (ref 10.3–14.5)
SARS-COV-2 RNA SPEC QL NAA+PROBE: SIGNIFICANT CHANGE UP
SODIUM SERPL-SCNC: 139 MMOL/L — SIGNIFICANT CHANGE UP (ref 135–145)
TROPONIN T, HIGH SENSITIVITY RESULT: 12 NG/L — SIGNIFICANT CHANGE UP (ref 0–51)
TROPONIN T, HIGH SENSITIVITY RESULT: 13 NG/L — SIGNIFICANT CHANGE UP (ref 0–51)
TROPONIN T, HIGH SENSITIVITY RESULT: 13 NG/L — SIGNIFICANT CHANGE UP (ref 0–51)
WBC # BLD: 6.37 K/UL — SIGNIFICANT CHANGE UP (ref 3.8–10.5)
WBC # FLD AUTO: 6.37 K/UL — SIGNIFICANT CHANGE UP (ref 3.8–10.5)

## 2021-05-12 PROCEDURE — 71045 X-RAY EXAM CHEST 1 VIEW: CPT | Mod: 26

## 2021-05-12 PROCEDURE — 93306 TTE W/DOPPLER COMPLETE: CPT | Mod: 26

## 2021-05-12 PROCEDURE — 99223 1ST HOSP IP/OBS HIGH 75: CPT

## 2021-05-12 PROCEDURE — 93454 CORONARY ARTERY ANGIO S&I: CPT | Mod: 26

## 2021-05-12 PROCEDURE — 93010 ELECTROCARDIOGRAM REPORT: CPT

## 2021-05-12 PROCEDURE — 99152 MOD SED SAME PHYS/QHP 5/>YRS: CPT

## 2021-05-12 PROCEDURE — 99285 EMERGENCY DEPT VISIT HI MDM: CPT

## 2021-05-12 RX ORDER — CLOPIDOGREL BISULFATE 75 MG/1
75 TABLET, FILM COATED ORAL DAILY
Refills: 0 | Status: DISCONTINUED | OUTPATIENT
Start: 2021-05-12 | End: 2021-05-13

## 2021-05-12 RX ORDER — METOPROLOL TARTRATE 50 MG
200 TABLET ORAL DAILY
Refills: 0 | Status: DISCONTINUED | OUTPATIENT
Start: 2021-05-12 | End: 2021-05-13

## 2021-05-12 RX ORDER — ZOLPIDEM TARTRATE 10 MG/1
5 TABLET ORAL AT BEDTIME
Refills: 0 | Status: DISCONTINUED | OUTPATIENT
Start: 2021-05-12 | End: 2021-05-13

## 2021-05-12 RX ORDER — INSULIN LISPRO 100/ML
VIAL (ML) SUBCUTANEOUS
Refills: 0 | Status: DISCONTINUED | OUTPATIENT
Start: 2021-05-12 | End: 2021-05-13

## 2021-05-12 RX ORDER — DEXTROSE 50 % IN WATER 50 %
15 SYRINGE (ML) INTRAVENOUS ONCE
Refills: 0 | Status: DISCONTINUED | OUTPATIENT
Start: 2021-05-12 | End: 2021-05-13

## 2021-05-12 RX ORDER — CLOPIDOGREL BISULFATE 75 MG/1
75 TABLET, FILM COATED ORAL ONCE
Refills: 0 | Status: COMPLETED | OUTPATIENT
Start: 2021-05-12 | End: 2021-05-12

## 2021-05-12 RX ORDER — DEXTROSE 50 % IN WATER 50 %
12.5 SYRINGE (ML) INTRAVENOUS ONCE
Refills: 0 | Status: DISCONTINUED | OUTPATIENT
Start: 2021-05-12 | End: 2021-05-13

## 2021-05-12 RX ORDER — SODIUM CHLORIDE 9 MG/ML
1000 INJECTION, SOLUTION INTRAVENOUS
Refills: 0 | Status: DISCONTINUED | OUTPATIENT
Start: 2021-05-12 | End: 2021-05-13

## 2021-05-12 RX ORDER — INSULIN LISPRO 100/ML
VIAL (ML) SUBCUTANEOUS AT BEDTIME
Refills: 0 | Status: DISCONTINUED | OUTPATIENT
Start: 2021-05-12 | End: 2021-05-13

## 2021-05-12 RX ORDER — GLUCAGON INJECTION, SOLUTION 0.5 MG/.1ML
1 INJECTION, SOLUTION SUBCUTANEOUS ONCE
Refills: 0 | Status: DISCONTINUED | OUTPATIENT
Start: 2021-05-12 | End: 2021-05-13

## 2021-05-12 RX ORDER — METFORMIN HYDROCHLORIDE 850 MG/1
1 TABLET ORAL
Qty: 0 | Refills: 0 | DISCHARGE

## 2021-05-12 RX ORDER — TAMSULOSIN HYDROCHLORIDE 0.4 MG/1
0.4 CAPSULE ORAL AT BEDTIME
Refills: 0 | Status: DISCONTINUED | OUTPATIENT
Start: 2021-05-12 | End: 2021-05-13

## 2021-05-12 RX ORDER — ATORVASTATIN CALCIUM 80 MG/1
20 TABLET, FILM COATED ORAL AT BEDTIME
Refills: 0 | Status: DISCONTINUED | OUTPATIENT
Start: 2021-05-12 | End: 2021-05-13

## 2021-05-12 RX ORDER — LOSARTAN POTASSIUM 100 MG/1
50 TABLET, FILM COATED ORAL DAILY
Refills: 0 | Status: DISCONTINUED | OUTPATIENT
Start: 2021-05-12 | End: 2021-05-13

## 2021-05-12 RX ORDER — ASPIRIN/CALCIUM CARB/MAGNESIUM 324 MG
81 TABLET ORAL DAILY
Refills: 0 | Status: DISCONTINUED | OUTPATIENT
Start: 2021-05-12 | End: 2021-05-13

## 2021-05-12 RX ORDER — ASPIRIN/CALCIUM CARB/MAGNESIUM 324 MG
324 TABLET ORAL ONCE
Refills: 0 | Status: COMPLETED | OUTPATIENT
Start: 2021-05-12 | End: 2021-05-12

## 2021-05-12 RX ORDER — NITROGLYCERIN 6.5 MG
0.4 CAPSULE, EXTENDED RELEASE ORAL ONCE
Refills: 0 | Status: COMPLETED | OUTPATIENT
Start: 2021-05-12 | End: 2021-05-12

## 2021-05-12 RX ORDER — SENNA PLUS 8.6 MG/1
2 TABLET ORAL AT BEDTIME
Refills: 0 | Status: DISCONTINUED | OUTPATIENT
Start: 2021-05-12 | End: 2021-05-13

## 2021-05-12 RX ORDER — RANOLAZINE 500 MG/1
1000 TABLET, FILM COATED, EXTENDED RELEASE ORAL
Refills: 0 | Status: DISCONTINUED | OUTPATIENT
Start: 2021-05-12 | End: 2021-05-13

## 2021-05-12 RX ORDER — METOPROLOL TARTRATE 50 MG
1 TABLET ORAL
Qty: 0 | Refills: 0 | DISCHARGE

## 2021-05-12 RX ORDER — NITROGLYCERIN 6.5 MG
0.4 CAPSULE, EXTENDED RELEASE ORAL ONCE
Refills: 0 | Status: DISCONTINUED | OUTPATIENT
Start: 2021-05-12 | End: 2021-05-12

## 2021-05-12 RX ORDER — DEXTROSE 50 % IN WATER 50 %
25 SYRINGE (ML) INTRAVENOUS ONCE
Refills: 0 | Status: DISCONTINUED | OUTPATIENT
Start: 2021-05-12 | End: 2021-05-13

## 2021-05-12 RX ORDER — PANTOPRAZOLE SODIUM 20 MG/1
40 TABLET, DELAYED RELEASE ORAL
Refills: 0 | Status: DISCONTINUED | OUTPATIENT
Start: 2021-05-12 | End: 2021-05-13

## 2021-05-12 RX ADMIN — TAMSULOSIN HYDROCHLORIDE 0.4 MILLIGRAM(S): 0.4 CAPSULE ORAL at 21:15

## 2021-05-12 RX ADMIN — Medication 324 MILLIGRAM(S): at 07:19

## 2021-05-12 RX ADMIN — CLOPIDOGREL BISULFATE 75 MILLIGRAM(S): 75 TABLET, FILM COATED ORAL at 07:19

## 2021-05-12 RX ADMIN — ATORVASTATIN CALCIUM 20 MILLIGRAM(S): 80 TABLET, FILM COATED ORAL at 21:15

## 2021-05-12 RX ADMIN — Medication 0.4 MILLIGRAM(S): at 09:00

## 2021-05-12 RX ADMIN — RANOLAZINE 1000 MILLIGRAM(S): 500 TABLET, FILM COATED, EXTENDED RELEASE ORAL at 17:33

## 2021-05-12 NOTE — ED PROVIDER NOTE - CLINICAL SUMMARY MEDICAL DECISION MAKING FREE TEXT BOX
ATTG: : chest pain with sig cardiac hx, cath last in 2020. ekg with t wave inversions leads II III avf and lateral leads V4-6. concern for cardiac / pulm / resp / infectious will check labs, check xray, cardiac work up and re eval for dispo

## 2021-05-12 NOTE — ED ADULT NURSE REASSESSMENT NOTE - NS ED NURSE REASSESS COMMENT FT1
Pt reports his pain has improve from 6/10 to 4/10. pt sitting comfortably with safety measures in place. pt denies: SOB, N/V, skin is warm, dry and normal for race.

## 2021-05-12 NOTE — H&P ADULT - NSICDXPASTMEDICALHX_GEN_ALL_CORE_FT
PAST MEDICAL HISTORY:  CAD (Coronary Artery Disease)     Coronary Stent from 1998 - 2016 (total 7 stents)    Diabetes Mellitus with neuropathy    GERD (Gastroesophageal Reflux Disease)     H/O: GI Bleed post colonoscopy in 2010, treated with blood transfusion.    Heart Attack 1998 with stent and 2001 with stent, stents again in approx 2006 and 2/08    Hemorrhoids     History of coronary angiogram in July of 2011 with STENTS to RPDA & RCA    HTN     Hypercholesterolemia      PAST MEDICAL HISTORY:  CAD (Coronary Artery Disease)     Coronary Stent from 1998 - 2020    Diabetes Mellitus with neuropathy    GERD (Gastroesophageal Reflux Disease)     H/O: GI Bleed post colonoscopy in 2010, treated with blood transfusion.    Heart Attack 1998 with stent and 2001 with stent, stents again in approx 2006 and 2/08    Hemorrhoids     History of coronary angiogram in July of 2011 with STENTS to RPDA & RCA    HTN     Hypercholesterolemia

## 2021-05-12 NOTE — H&P ADULT - PROBLEM SELECTOR PLAN 2
Previously was on insulin now no longer. only on oral   Hold oral meds and start on SS.   check A1c and consider starting if high

## 2021-05-12 NOTE — DISCHARGE NOTE PROVIDER - CARE PROVIDER_API CALL
wheelchair Ac Blue)  Cardiology Medicine  [address] [facility], [place] [address]  Phone: [phone]  Fax: [phone]  Follow Up Time: 2 weeks

## 2021-05-12 NOTE — ED PROVIDER NOTE - CPE EDP EYES NORM
1515 - pt noted to be posturing with rigid muscles and not responding to voice. Eloina, NP notified. Came to bedside to assess patient. States to give 2 mg ativan. Pt breifly stopped posturing, but continued after approximately 5 mins. Additional 2 mg of Ativan given. Pt neuro exam did not improve. Decision was made to intubate patient.     1730 - patient taken for CT scan.     1800 - Dr. Gage with neurosurgery notified this RN to prepare patient for emergent EVD placement.    normal...

## 2021-05-12 NOTE — DISCHARGE NOTE PROVIDER - NSDCFUADDINST_GEN_ALL_CORE_FT
No heavy lifting, strenuous activity, bending, straining, or unnecessary stair climbing for 2 weeks. No driving for 2 days. You may shower 24 hours following the procedure but avoid baths/swimming for 1 week. Check your groin site for bleeding and/or swelling daily following procedure and call your doctor immediately if it occurs or if you experience increased pain at the site. Follow up with your cardiologist in 1-2 weeks. You may call [facility] if you have any questions/concerns regarding your procedure [phone].

## 2021-05-12 NOTE — H&P ADULT - PROBLEM SELECTOR PLAN 1
Typical cardiac pain with long standing cardiac history.   Plan for cardiac cath today.   Cont with ASA and Plavix. no hep gtt unless +CE  Cont with BB and statin.

## 2021-05-12 NOTE — DISCHARGE NOTE PROVIDER - NSDCMRMEDTOKEN_GEN_ALL_CORE_FT
Ambien 10 mg oral tablet: 1 tab(s) orally once a day (at bedtime)  Aspirin Enteric Coated 81 mg oral delayed release tablet: 1 tab(s) orally once a day  atorvastatin 20 mg oral tablet: 1 tab(s) orally once a day  clopidogrel 75 mg oral tablet: 1 tab(s) orally once a day  Colace 100 mg oral capsule: 1 cap(s) orally 3 times a day, As Needed  Flomax 0.4 mg oral capsule: 1 cap(s) orally once a day  metFORMIN 500 mg oral tablet: 1 tab(s) orally 2 times a day  metoprolol succinate 200 mg oral tablet, extended release: 1 tab(s) orally once a day  olmesartan 20 mg oral tablet: 1 tab(s) orally once a day  Protonix 40 mg oral delayed release tablet: 1 tab(s) orally once a day  Ranexa 1000 mg oral tablet, extended release: 1 tab(s) orally 2 times a day   Ambien 10 mg oral tablet: 1 tab(s) orally once a day (at bedtime)  Aspirin Enteric Coated 81 mg oral delayed release tablet: 1 tab(s) orally once a day  atorvastatin 20 mg oral tablet: 1 tab(s) orally once a day  clopidogrel 75 mg oral tablet: 1 tab(s) orally once a day  Colace 100 mg oral capsule: 1 cap(s) orally 3 times a day, As Needed  Flomax 0.4 mg oral capsule: 1 cap(s) orally once a day  metFORMIN 500 mg oral tablet: 1 tab(s) orally 2 times a day. DO NOT TAKE on 5/13 or 5/14, restart on Saturday 5/15.  metoprolol succinate 200 mg oral tablet, extended release: 1 tab(s) orally once a day  olmesartan 20 mg oral tablet: 1 tab(s) orally once a day  Protonix 40 mg oral delayed release tablet: 1 tab(s) orally once a day  Ranexa 1000 mg oral tablet, extended release: 1 tab(s) orally 2 times a day

## 2021-05-12 NOTE — ED PROVIDER NOTE - ATTENDING CONTRIBUTION TO CARE
64 y/o m with pmhx HTN HLD CAD s/p stents x 7, GERD, presents for approx 6 days of not feeling well. Complains of shortness of breath initially, then developed some chest pressure radiating to left arm 3 days ago. no fever or chills no cough. went to cardiologist Dr. Forte who did labs and ekg in office and was neg, also did COVID test and was neg in office. today worse pain, did not take ASA. no associated heart palp or diaphoresis. no medications helped or worsened.   Gen. no acute resp distress but appears discomfortable.   HEENT:  ALEXANDER EOMI  Lungs:  b/l bs  CVS: S1S2   Abd;  soft non tender no distention  Ext: trace lower ext pitting edema no erythema no calf tenderness  Neuro: aaox3 no focal deficits  MSK: moving all ext spon.

## 2021-05-12 NOTE — ED ADULT NURSE NOTE - OBJECTIVE STATEMENT
65y old male PMH CABG, Stents, DM, HTN, walk in from triage c/o chest pain. A&Ox3. Patient states 6 days ago he began having chest pain on left pectoral region, gradually getting worse, yesterday began having SOB w/ pain radiating to left arm, worse w/ exertion. He endorses his symptoms feel as the last time he had stents placed in March 2020. He denies ha, n/v/d, abdominal pain, f/c, urinary symptoms, hematuria, sick contacts. Patient is well appearing, A&Ox4 lungs cta bilaterally, no difficulty speaking in complete sentences, s1s2 heart sounds heard, pulses x 4,abdomen soft nontender skin intact. IV inserted, cardiac monitor in place, call bell within reach. 65y old male PMH CABG, Stents, DM, HTN, walk in from triage c/o chest pain. A&Ox3. Patient states 6 days ago he began having chest pain on left pectoral region, gradually getting worse, yesterday began having SOB w/ pain radiating to left arm, worse w/ exertion. He endorses his symptoms feel as the last time he had stents placed in March 2020. On Plavix and Aspirin did not take any today. He denies ha, n/v/d, abdominal pain, f/c, urinary symptoms, hematuria, sick contacts. Patient is well appearing, A&Ox4 lungs cta bilaterally, no difficulty speaking in complete sentences, s1s2 heart sounds heard, pulses x 4,abdomen soft nontender skin intact. IV inserted, cardiac monitor in place, call bell within reach.

## 2021-05-12 NOTE — DISCHARGE NOTE PROVIDER - HOSPITAL COURSE
HPI:  64 yo m with significant cardiac history with multiple stents, DM2, HTN, HLD, GERD presented with left sided chest heaviness radiation to left arm similar to previous episodes when he required interventions. Symptoms started about 6 days ago with left sided heaviness. progressively has worsening with time and activity, unable to ambulate too much due to symptoms. denies SOB or palpitations. afebrile. no cough or fevers. Was scheduled to have outpt LHC but due to persistence and severity decided to come to ED today.     5/12/21:   S/P Diagnostic LHC via RFA, D1 and RPDA: 80%- not amendable to stent. Cardiac enzymes ___.    HPI:  64 yo m with significant cardiac history with multiple stents, DM2, HTN, HLD, GERD presented with left sided chest heaviness radiation to left arm similar to previous episodes when he required interventions. Symptoms started about 6 days ago with left sided heaviness. progressively has worsening with time and activity, unable to ambulate too much due to symptoms. denies SOB or palpitations. afebrile. no cough or fevers. Was scheduled to have outpt LHC but due to persistence and severity decided to come to ED today.     5/12/21:   S/P Diagnostic LHC via RFA, D1 and RPDA: 80%- not amendable to stent. Cardiac enzymes were negative x3, echo showed no signif wall motion abnormalities.

## 2021-05-12 NOTE — ED PROVIDER NOTE - PROGRESS NOTE DETAILS
Received signout on patient from resident Dr. Samano, unstable angina sxs, requiring cath in past, will require admission and likely catheterization. pending labwork, cxr, and admission. - Miller Davis PA-C Received signout on patient from resident Dr. Samano, unstable angina sxs, requiring cath in past, will require admission and likely catheterization. pending labwork, cxr, and admission. Pt did not take his home ASA/plavix today, will give doses now. - Miller Davis PA-C Repeat EKG unchanged. - Miller Davis PA-C cardiology fellow Dr. Saucedo aware, coming to see pt. - Miller Davis PA-C cards at bedside Cards evaluated pt at bedside, requested one dose of sublingual nitro, likely cath today but will d/w their attending. Will obtain R sided EKG prior to giving nitro. - Miller Davis PA-C Cards fellow Dr. Saucedo called, plan for cath this afternoon with Dr. Ramírez, requested tele hospitalist admission. Page sent out to hospitalist. - Miller Davis PA-C Second page sent out to hospitalist. - Miller Davis PA-C Cards fellow Dr. Saucedo called, plan for cath this afternoon with Dr. Ramírez, requested tele hospitalist admission. Pt received ASA/plavix and nitro x 1 here, d/w Dr. Saucedo, no other therapy at this time. Page sent out to hospitalist. - Miller Davis PA-C Patient endorsed to Dr. Lowe for admission to tele, aware of plan for cath this afternoon. - JOS CarmenC

## 2021-05-12 NOTE — H&P ADULT - HISTORY OF PRESENT ILLNESS
66 yo m with significant cardiac history with multiple stents, DM2, HTN, HLD, GERD presented with left sided chest heaviness radiation to left arm similar to previous episodes when he required interventions. Symptoms started about 6 days ago with left sided heaviness. progressively has worsening with time and activity, unable to ambulate too much due to symptoms. denies SOB or palpitations. afebrile. no cough or fevers. Was scheduled to have outpt [facility] but due to persistence and severity decided to come to ED today.     In ED: VS: 36.5, 131/76, 61, 18, 97% RA.   Patient seen at [facility] lying . still with some vague chest discomfort.

## 2021-05-12 NOTE — CONSULT NOTE ADULT - SUBJECTIVE AND OBJECTIVE BOX
Patient seen and evaluated at bedside    Chief Complaint:    HPI:  66 y/o m with pmhx HTN HLD, DM on insulin pump, CAD s/p multiple PCI (prior in 1998, 2001, 2006, 2008, 2011, 2015, RCA 2017, pLAD 2020) GERD, presents for approx 6 days of not feeling well. Complains of shortness of breath initially, then developed some chest pressure radiating to left arm 3 days ago. no fever or chills no cough. went to cardiologist Dr. Forte who did labs and ekg in office and was neg, also did COVID test and was neg in office. today worse pain, did not take ASA. no associated heart palp or diaphoresis. no medications helped or worsened.       last cath 7/2020  CORONARY VESSELS: The coronary circulation is right dominant.  LM:   --  LM: Angiography showed minor luminal irregularities with no flow  limiting lesions.  LAD:   --  Proximal LAD: There was a 90 % stenosis.  CX:   --  Circumflex: Angiography showed minor luminal irregularities with  no flow limiting lesions.  RCA:   --  RCA: Angiography showed minor luminal irregularities with no  flow limiting lesions.    Home meds:  lipitor 80  repatha  ASA 81  plavix 75          PMHx:   HTN    Diabetes Mellitus    GERD (Gastroesophageal Reflux Disease)    Heart Attack    CAD (Coronary Artery Disease)    Coronary Stent    Hypercholesterolemia    H/O: GI Bleed    History of coronary angiogram    Hemorrhoids        PSHx:   GERD (Gastroesophageal Reflux Disease)    No Past Surgical History    Stented coronary artery    Benign essential HTN    H/O gastric bypass        Allergies:  No Known Allergies      Home Meds:    Current Medications:       FAMILY HISTORY:  Family history of cardiac disorder (Grandparent)    Family history of diabetes mellitus          Physical Exam:  T(F): 97.7 (05-12), Max: 97.7 (05-12)  HR: 62 (05-12) (62 - 82)  BP: 172/88 (05-12) (138/84 - 172/88)  RR: 20 (05-12)  SpO2: 96% (05-12)  GENERAL: No acute distress, well-developed  HEAD:  Atraumatic, Normocephalic  ENT: EOMI, PERRLA, conjunctiva and sclera clear, Neck supple, No JVD, moist mucosa  CHEST/LUNG: Clear to auscultation bilaterally; No wheeze, equal breath sounds bilaterally   BACK: No spinal tenderness  HEART: Regular rate and rhythm; No murmurs, rubs, or gallops  ABDOMEN: Soft, Nontender, Nondistended; Bowel sounds present  EXTREMITIES:  No clubbing, cyanosis, or edema  PSYCH: Nl behavior, nl affect  NEUROLOGY: AAOx3, non-focal, cranial nerves intact  SKIN: Normal color, No rashes or lesions  LINES:    Cardiovascular Diagnostic Testing:    ECG: Personally reviewed:    Echo: Personally reviewed:    Stress Testing:    Cath:    Imaging:    CXR: Personally reviewed    Labs: Personally reviewed                        15.3   6.37  )-----------( 192      ( 12 May 2021 07:15 )             46.1     05-12    139  |  101  |  14  ----------------------------<  129<H>  4.1   |  23  |  0.91    Ca    9.7      12 May 2021 07:15  Mg     1.7     05-12    TPro  7.6  /  Alb  4.8  /  TBili  0.4  /  DBili  x   /  AST  27  /  ALT  32  /  AlkPhos  51  05-12      Serum Pro-Brain Natriuretic Peptide: 100 pg/mL (05-12 @ 07:15)         Patient seen and evaluated at bedside    Chief Complaint:    HPI:  66 y/o m with pmhx HTN HLD, DM on insulin pump, CAD s/p multiple PCI (prior in 1998, 2001, 2006, 2008, 2011, 2015, RCA 2017, pLAD 2020) GERD, presents for approx 6 days of not feeling well. Complains of shortness of breath initially, then developed some chest pressure radiating to left arm that started Monday. Pain is not related to exertion or activity.  He went to his outpatient cardiologist Dr Gates who tried to arranged for scheduled LHC however patient was not able to wait for the appointment so he came to the ED today. Denies any fever or chills no cough. States he feels this pressure/pain/SOB every time he needed a prior PCI in the past. Notes mild relief with sublingual nitro. At this time complaining of mild chest pressure - improved since earlier this week but still preset.       last cath 7/2020  CORONARY VESSELS: The coronary circulation is right dominant.  LM:   --  LM: Angiography showed minor luminal irregularities with no flow  limiting lesions.  LAD:   --  Proximal LAD: There was a 90 % stenosis.  CX:   --  Circumflex: Angiography showed minor luminal irregularities with  no flow limiting lesions.  RCA:   --  RCA: Angiography showed minor luminal irregularities with no  flow limiting lesions.    Home meds:  lipitor 80  repatha  ASA 81  plavix 75          PMHx:   HTN    Diabetes Mellitus    GERD (Gastroesophageal Reflux Disease)    Heart Attack    CAD (Coronary Artery Disease)    Coronary Stent    Hypercholesterolemia    H/O: GI Bleed    History of coronary angiogram    Hemorrhoids        PSHx:   GERD (Gastroesophageal Reflux Disease)    No Past Surgical History    Stented coronary artery    Benign essential HTN    H/O gastric bypass        Allergies:  No Known Allergies      Home Meds:    Current Medications:       FAMILY HISTORY:  Family history of cardiac disorder (Grandparent)    Family history of diabetes mellitus          Physical Exam:  T(F): 97.7 (05-12), Max: 97.7 (05-12)  HR: 62 (05-12) (62 - 82)  BP: 172/88 (05-12) (138/84 - 172/88)  RR: 20 (05-12)  SpO2: 96% (05-12)  GENERAL: No acute distress, well-developed  HEAD:  Atraumatic, Normocephalic  ENT: EOMI, PERRLA, conjunctiva and sclera clear, Neck supple, No JVD, moist mucosa  CHEST/LUNG: Clear to auscultation bilaterally; No wheeze, equal breath sounds bilaterally   BACK: No spinal tenderness  HEART: Regular rate and rhythm; No murmurs, rubs, or gallops  ABDOMEN: Soft, Nontender, Nondistended; Bowel sounds present  EXTREMITIES:  No clubbing, cyanosis, or edema  PSYCH: Nl behavior, nl affect  NEUROLOGY: AAOx3, non-focal, cranial nerves intact  SKIN: Normal color, No rashes or lesions  LINES:    Cardiovascular Diagnostic Testing:    ECG: Personally reviewed:    Echo: Personally reviewed:    Stress Testing:    Cath:    Imaging:    CXR: Personally reviewed    Labs: Personally reviewed                        15.3   6.37  )-----------( 192      ( 12 May 2021 07:15 )             46.1     05-12    139  |  101  |  14  ----------------------------<  129<H>  4.1   |  23  |  0.91    Ca    9.7      12 May 2021 07:15  Mg     1.7     05-12    TPro  7.6  /  Alb  4.8  /  TBili  0.4  /  DBili  x   /  AST  27  /  ALT  32  /  AlkPhos  51  05-12      Serum Pro-Brain Natriuretic Peptide: 100 pg/mL (05-12 @ 07:15)

## 2021-05-12 NOTE — DISCHARGE NOTE PROVIDER - NSDCCPTREATMENT_GEN_ALL_CORE_FT
PRINCIPAL PROCEDURE  Procedure: Catheterization, coronary artery, with angiogram  Findings and Treatment: S/P diagnostic LHC via RFA.

## 2021-05-12 NOTE — H&P ADULT - ASSESSMENT
66 yo m with significant cardiac history with multiple stents, DM2, HTN, HLD, GERD presented with likely unstable angina

## 2021-05-12 NOTE — CONSULT NOTE ADULT - ASSESSMENT
66 y/o m with pmhx HTN HLD, DM on insulin pump, CAD s/p multiple PCI (prior in 1998, 2001, 2006, 2008, 2011, 2015, RCA 2017, pLAD 2020) GERD, presents for approx 6 days of chest pressure/SOB not related to exertion.    #Chest pain- likely unstable angina based on clinical symptoms and presentaiton. EKG with Q wave in lead III, TWI in V4-V6. Trop ~13 x1  -please trend trop and CKMB x2  -plan for [facility] this afternoon  -continue  ASA, plavix  please call cardiology if there are any clinical changes    Venancio Saucedo MD  Cardiology Fellow PGY-4  [phone]  All Cardiology service information can be found 24/7 on [email or web address], password: [email or web address]

## 2021-05-12 NOTE — ED PROVIDER NOTE - OBJECTIVE STATEMENT
64 y/o m with pmhx HTN HLD CAD s/p stents x 7, GERD, presents for approx 6 days of not feeling well. Complains of shortness of breath initially, then developed some chest pressure radiating to left arm 3 days ago. no fever or chills no cough. went to cardiologist Dr. Forte who did labs and ekg in office and was neg, also did COVID test and was neg in office. today worse pain, did not take ASA. no associated heart palp or diaphoresis. no medications helped or worsened.

## 2021-05-12 NOTE — H&P ADULT - NSICDXFAMILYHX_GEN_ALL_CORE_FT
FAMILY HISTORY:  Family history of diabetes mellitus    Grandparent  Still living? Unknown  Family history of cardiac disorder, Age at diagnosis: Age Unknown

## 2021-05-12 NOTE — H&P ADULT - PROBLEM SELECTOR PLAN 4
Cont with statin.   previously was on high intensity statin but now only 40mg   May need to increase back. ?contraindication or SE

## 2021-05-12 NOTE — ED PROVIDER NOTE - DATE/TIME 4
You can access the FollowMyHealth Patient Portal offered by [facility] by registering at the following website: [email or web address]. By joining [employer]’s FollowMyHealth portal, you will also be able to view your health information using other applications (apps) compatible with our system.
12-May-2021 08:41

## 2021-05-12 NOTE — DISCHARGE NOTE PROVIDER - NSDCCPCAREPLAN_GEN_ALL_CORE_FT
PRINCIPAL DISCHARGE DIAGNOSIS  Diagnosis: Angina pectoris  Assessment and Plan of Treatment: *-Please take your medications as ordered. If you were prescribed medications such as aspirin, Plavix, Brilinta, or Effient it is important that you do not miss any doses, as these medications keep your stent(s) open. ONLY your cardiologist can decide when these medications can be stopped. -Follow a heart healthy diet to prevent further cardiac disease.      SECONDARY DISCHARGE DIAGNOSES  Diagnosis: Benign essential HTN  Assessment and Plan of Treatment: Low salt diet  Activity as tolerated.  Take all medication as prescribed.  Follow up with your medical doctor for routine blood pressure monitoring at your next visit.  Notify your doctor if you have any of the following symptoms:   Dizziness, Lightheadedness, Blurry vision, Headache, Chest pain, Shortness of breath

## 2021-05-12 NOTE — H&P ADULT - NSICDXPASTSURGICALHX_GEN_ALL_CORE_FT
PAST SURGICAL HISTORY:  H/O gastric bypass     No Past Surgical History     Stented coronary artery x 7 ( Last PCI in 2016)

## 2021-05-13 ENCOUNTER — TRANSCRIPTION ENCOUNTER (OUTPATIENT)
Age: 66
End: 2021-05-13

## 2021-05-13 VITALS
OXYGEN SATURATION: 97 % | RESPIRATION RATE: 18 BRPM | SYSTOLIC BLOOD PRESSURE: 116 MMHG | DIASTOLIC BLOOD PRESSURE: 76 MMHG | HEART RATE: 59 BPM

## 2021-05-13 DIAGNOSIS — Z02.9 ENCOUNTER FOR ADMINISTRATIVE EXAMINATIONS, UNSPECIFIED: ICD-10-CM

## 2021-05-13 LAB
A1C WITH ESTIMATED AVERAGE GLUCOSE RESULT: 6.5 % — HIGH (ref 4–5.6)
ANION GAP SERPL CALC-SCNC: 14 MMOL/L — SIGNIFICANT CHANGE UP (ref 5–17)
BASOPHILS # BLD AUTO: 0.05 K/UL — SIGNIFICANT CHANGE UP (ref 0–0.2)
BASOPHILS NFR BLD AUTO: 0.7 % — SIGNIFICANT CHANGE UP (ref 0–2)
BUN SERPL-MCNC: 13 MG/DL — SIGNIFICANT CHANGE UP (ref 7–23)
CALCIUM SERPL-MCNC: 9.7 MG/DL — SIGNIFICANT CHANGE UP (ref 8.4–10.5)
CHLORIDE SERPL-SCNC: 103 MMOL/L — SIGNIFICANT CHANGE UP (ref 96–108)
CO2 SERPL-SCNC: 21 MMOL/L — LOW (ref 22–31)
CREAT SERPL-MCNC: 0.89 MG/DL — SIGNIFICANT CHANGE UP (ref 0.5–1.3)
EOSINOPHIL # BLD AUTO: 0.14 K/UL — SIGNIFICANT CHANGE UP (ref 0–0.5)
EOSINOPHIL NFR BLD AUTO: 2 % — SIGNIFICANT CHANGE UP (ref 0–6)
ESTIMATED AVERAGE GLUCOSE: 140 MG/DL — HIGH (ref 68–114)
GLUCOSE BLDC GLUCOMTR-MCNC: 128 MG/DL — HIGH (ref 70–99)
GLUCOSE BLDC GLUCOMTR-MCNC: 133 MG/DL — HIGH (ref 70–99)
GLUCOSE SERPL-MCNC: 127 MG/DL — HIGH (ref 70–99)
HCT VFR BLD CALC: 42.6 % — SIGNIFICANT CHANGE UP (ref 39–50)
HGB BLD-MCNC: 14.3 G/DL — SIGNIFICANT CHANGE UP (ref 13–17)
IMM GRANULOCYTES NFR BLD AUTO: 0.6 % — SIGNIFICANT CHANGE UP (ref 0–1.5)
LYMPHOCYTES # BLD AUTO: 1.72 K/UL — SIGNIFICANT CHANGE UP (ref 1–3.3)
LYMPHOCYTES # BLD AUTO: 24.7 % — SIGNIFICANT CHANGE UP (ref 13–44)
MCHC RBC-ENTMCNC: 30.1 PG — SIGNIFICANT CHANGE UP (ref 27–34)
MCHC RBC-ENTMCNC: 33.6 GM/DL — SIGNIFICANT CHANGE UP (ref 32–36)
MCV RBC AUTO: 89.7 FL — SIGNIFICANT CHANGE UP (ref 80–100)
MONOCYTES # BLD AUTO: 0.55 K/UL — SIGNIFICANT CHANGE UP (ref 0–0.9)
MONOCYTES NFR BLD AUTO: 7.9 % — SIGNIFICANT CHANGE UP (ref 2–14)
NEUTROPHILS # BLD AUTO: 4.45 K/UL — SIGNIFICANT CHANGE UP (ref 1.8–7.4)
NEUTROPHILS NFR BLD AUTO: 64.1 % — SIGNIFICANT CHANGE UP (ref 43–77)
NRBC # BLD: 0 /100 WBCS — SIGNIFICANT CHANGE UP (ref 0–0)
PLATELET # BLD AUTO: 195 K/UL — SIGNIFICANT CHANGE UP (ref 150–400)
POTASSIUM SERPL-MCNC: 3.9 MMOL/L — SIGNIFICANT CHANGE UP (ref 3.5–5.3)
POTASSIUM SERPL-SCNC: 3.9 MMOL/L — SIGNIFICANT CHANGE UP (ref 3.5–5.3)
RBC # BLD: 4.75 M/UL — SIGNIFICANT CHANGE UP (ref 4.2–5.8)
RBC # FLD: 12.2 % — SIGNIFICANT CHANGE UP (ref 10.3–14.5)
SODIUM SERPL-SCNC: 138 MMOL/L — SIGNIFICANT CHANGE UP (ref 135–145)
WBC # BLD: 6.95 K/UL — SIGNIFICANT CHANGE UP (ref 3.8–10.5)
WBC # FLD AUTO: 6.95 K/UL — SIGNIFICANT CHANGE UP (ref 3.8–10.5)

## 2021-05-13 PROCEDURE — 80048 BASIC METABOLIC PNL TOTAL CA: CPT

## 2021-05-13 PROCEDURE — C1894: CPT

## 2021-05-13 PROCEDURE — 82962 GLUCOSE BLOOD TEST: CPT

## 2021-05-13 PROCEDURE — C1887: CPT

## 2021-05-13 PROCEDURE — 85610 PROTHROMBIN TIME: CPT

## 2021-05-13 PROCEDURE — 83036 HEMOGLOBIN GLYCOSYLATED A1C: CPT

## 2021-05-13 PROCEDURE — 99285 EMERGENCY DEPT VISIT HI MDM: CPT | Mod: 25

## 2021-05-13 PROCEDURE — 71045 X-RAY EXAM CHEST 1 VIEW: CPT

## 2021-05-13 PROCEDURE — 85025 COMPLETE CBC W/AUTO DIFF WBC: CPT

## 2021-05-13 PROCEDURE — 83880 ASSAY OF NATRIURETIC PEPTIDE: CPT

## 2021-05-13 PROCEDURE — 84484 ASSAY OF TROPONIN QUANT: CPT

## 2021-05-13 PROCEDURE — 82553 CREATINE MB FRACTION: CPT

## 2021-05-13 PROCEDURE — 80053 COMPREHEN METABOLIC PANEL: CPT

## 2021-05-13 PROCEDURE — 93306 TTE W/DOPPLER COMPLETE: CPT

## 2021-05-13 PROCEDURE — 83735 ASSAY OF MAGNESIUM: CPT

## 2021-05-13 PROCEDURE — 93454 CORONARY ARTERY ANGIO S&I: CPT

## 2021-05-13 PROCEDURE — 93005 ELECTROCARDIOGRAM TRACING: CPT

## 2021-05-13 PROCEDURE — C1769: CPT

## 2021-05-13 PROCEDURE — 85730 THROMBOPLASTIN TIME PARTIAL: CPT

## 2021-05-13 PROCEDURE — U0003: CPT

## 2021-05-13 PROCEDURE — 99152 MOD SED SAME PHYS/QHP 5/>YRS: CPT

## 2021-05-13 PROCEDURE — 99239 HOSP IP/OBS DSCHRG MGMT >30: CPT

## 2021-05-13 PROCEDURE — 82550 ASSAY OF CK (CPK): CPT

## 2021-05-13 RX ORDER — METFORMIN HYDROCHLORIDE 850 MG/1
1 TABLET ORAL
Qty: 0 | Refills: 0 | DISCHARGE

## 2021-05-13 RX ADMIN — PANTOPRAZOLE SODIUM 40 MILLIGRAM(S): 20 TABLET, DELAYED RELEASE ORAL at 06:04

## 2021-05-13 RX ADMIN — RANOLAZINE 1000 MILLIGRAM(S): 500 TABLET, FILM COATED, EXTENDED RELEASE ORAL at 06:03

## 2021-05-13 RX ADMIN — LOSARTAN POTASSIUM 50 MILLIGRAM(S): 100 TABLET, FILM COATED ORAL at 06:04

## 2021-05-13 RX ADMIN — Medication 200 MILLIGRAM(S): at 06:04

## 2021-05-13 RX ADMIN — CLOPIDOGREL BISULFATE 75 MILLIGRAM(S): 75 TABLET, FILM COATED ORAL at 06:04

## 2021-05-13 RX ADMIN — Medication 81 MILLIGRAM(S): at 06:03

## 2021-05-13 NOTE — DISCHARGE NOTE NURSING/CASE MANAGEMENT/SOCIAL WORK - PATIENT PORTAL LINK FT
You can access the FollowMyHealth Patient Portal offered by [facility] by registering at the following website: [email or web address]. By joining [employer]’s FollowMyHealth portal, you will also be able to view your health information using other applications (apps) compatible with our system.

## 2021-05-13 NOTE — PROGRESS NOTE ADULT - SUBJECTIVE AND OBJECTIVE BOX
Patient seen and examined at bedside.    Overnight Events:       REVIEW OF SYSTEMS:  Constitutional:     [ ] negative [ ] fevers [ ] chills [ ] weight loss [ ] weight gain  HEENT:                  [ ] negative [ ] dry eyes [ ] eye irritation [ ] postnasal drip [ ] nasal congestion  CV:                         [ ] negative  [ ] chest pain [ ] orthopnea [ ] palpitations [ ] murmur  Resp:                     [ ] negative [ ] cough [ ] shortness of breath [ ] dyspnea [ ] wheezing [ ] sputum [ ]hemoptysis  GI:                          [ ] negative [ ] nausea [ ] vomiting [ ] diarrhea [ ] constipation [ ] abd pain [ ] dysphagia   :                        [ ] negative [ ] dysuria [ ] nocturia [ ] hematuria [ ] increased urinary frequency  Musculoskeletal: [ ] negative [ ] back pain [ ] myalgias [ ] arthralgias [ ] fracture  Skin:                       [ ] negative [ ] rash [ ] itch  Neurological:        [ ] negative [ ] headache [ ] dizziness [ ] syncope [ ] weakness [ ] numbness  Psychiatric:           [ ] negative [ ] anxiety [ ] depression  Endocrine:            [ ] negative [ ] diabetes [ ] thyroid problem  Heme/Lymph:      [ ] negative [ ] anemia [ ] bleeding problem  Allergic/Immune: [ ] negative [ ] itchy eyes [ ] nasal discharge [ ] hives [ ] angioedema    [ ] All other systems negative  [ ] Unable to assess ROS due to    Current Meds:  aspirin enteric coated 81 milliGRAM(s) Oral daily  atorvastatin 20 milliGRAM(s) Oral at bedtime  clopidogrel Tablet 75 milliGRAM(s) Oral daily  dextrose 40% Gel 15 Gram(s) Oral once  dextrose 5%. 1000 milliLiter(s) IV Continuous <Continuous>  dextrose 5%. 1000 milliLiter(s) IV Continuous <Continuous>  dextrose 50% Injectable 25 Gram(s) IV Push once  dextrose 50% Injectable 12.5 Gram(s) IV Push once  dextrose 50% Injectable 25 Gram(s) IV Push once  glucagon  Injectable 1 milliGRAM(s) IntraMuscular once  insulin lispro (ADMELOG) corrective regimen sliding scale   SubCutaneous three times a day before meals  insulin lispro (ADMELOG) corrective regimen sliding scale   SubCutaneous at bedtime  losartan 50 milliGRAM(s) Oral daily  metoprolol succinate  milliGRAM(s) Oral daily  pantoprazole    Tablet 40 milliGRAM(s) Oral before breakfast  ranolazine 1000 milliGRAM(s) Oral two times a day  senna 2 Tablet(s) Oral at bedtime PRN  tamsulosin 0.4 milliGRAM(s) Oral at bedtime  zolpidem 5 milliGRAM(s) Oral at bedtime PRN  zolpidem 5 milliGRAM(s) Oral at bedtime PRN      PAST MEDICAL & SURGICAL HISTORY:  HTN    Diabetes Mellitus  with neuropathy    GERD (Gastroesophageal Reflux Disease)    Heart Attack  1998 with stent and 2001 with stent, stents again in approx 2006 and 2/08    CAD (Coronary Artery Disease)    Coronary Stent  from 1998 - 2020    Hypercholesterolemia    H/O: GI Bleed  post colonoscopy in 2010, treated with blood transfusion.    History of coronary angiogram  in July of 2011 with STENTS to RPDA &amp; RCA    Hemorrhoids    No Past Surgical History    Stented coronary artery  x 7 ( Last PCI in 2016)    H/O gastric bypass        Vitals:  T(F): 97.7 (05-13), Max: 98 (05-12)  HR: 64 (05-13) (53 - 82)  BP: 124/75 (05-13) (123/75 - 172/88)  RR: 17 (05-13)  SpO2: 94% (05-13)  I&O's Summary    12 May 2021 07:01  -  13 May 2021 06:21  --------------------------------------------------------  IN: 120 mL / OUT: 0 mL / NET: 120 mL        Physical Exam:  Appearance: No acute distress; well appearing  Eyes: PERRL, EOMI, pink conjunctiva  HENT: Normal oral mucosa  Cardiovascular: RRR, S1, S2, no murmurs, rubs, or gallops; no edema; no JVD  Respiratory: Clear to auscultation bilaterally  Gastrointestinal: soft, non-tender, non-distended with normal bowel sounds  Musculoskeletal: No clubbing; no joint deformity   Neurologic: Non-focal  Lymphatic: No lymphadenopathy  Psychiatry: AAOx3, mood & affect appropriate  Skin: No rashes, ecchymoses, or cyanosis                          14.3   6.95  )-----------( 195      ( 13 May 2021 00:17 )             42.6     05-13    138  |  103  |  13  ----------------------------<  127<H>  3.9   |  21<L>  |  0.89    Ca    9.7      13 May 2021 00:17  Mg     1.7     05-12    TPro  7.6  /  Alb  4.8  /  TBili  0.4  /  DBili  x   /  AST  27  /  ALT  32  /  AlkPhos  51  05-12    PT/INR - ( 12 May 2021 07:15 )   PT: 11.4 sec;   INR: 0.95 ratio         PTT - ( 12 May 2021 07:15 )  PTT:36.1 sec  CARDIAC MARKERS ( 12 May 2021 19:21 )  x     / x     / 61 U/L / x     / 1.8 ng/mL      Serum Pro-Brain Natriuretic Peptide: 100 pg/mL (05-12 @ 07:15)    Cath]  LM:   --  LM: Angiography showed minor luminal irregularities with no flow  limiting lesions.  LAD:   --  LAD: Angiography showed minor luminal irregularities with no  flow limiting lesions.  --  Mid LAD: There was a 10 % stenosis at the site of a prior stent.  CX:   --  Circumflex: Angiography showed minor luminal irregularities with  no flow limiting lesions.  RCA:   --  Mid RCA: There was a 10 % stenosis at the site of a prior stent.  COMPLICATIONS: There were no complications.  DIAGNOSTIC RECOMMENDATIONS: The patient should continue with the present  medications.      New ECG(s): Personally reviewed    Echo:    Stress Testing:     Cath:    Imaging:    Interpretation of Telemetry:   Patient seen and examined at bedside.    Overnight Events:   No acute events overnight. Denies any chest pain or SOB.     REVIEW OF SYSTEMS:  Constitutional:     [ ] negative [ ] fevers [ ] chills [ ] weight loss [ ] weight gain  HEENT:                  [ ] negative [ ] dry eyes [ ] eye irritation [ ] postnasal drip [ ] nasal congestion  CV:                         [ ] negative  [ ] chest pain [ ] orthopnea [ ] palpitations [ ] murmur  Resp:                     [ ] negative [ ] cough [ ] shortness of breath [ ] dyspnea [ ] wheezing [ ] sputum [ ]hemoptysis  GI:                          [ ] negative [ ] nausea [ ] vomiting [ ] diarrhea [ ] constipation [ ] abd pain [ ] dysphagia   :                        [ ] negative [ ] dysuria [ ] nocturia [ ] hematuria [ ] increased urinary frequency  Musculoskeletal: [ ] negative [ ] back pain [ ] myalgias [ ] arthralgias [ ] fracture  Skin:                       [ ] negative [ ] rash [ ] itch  Neurological:        [ ] negative [ ] headache [ ] dizziness [ ] syncope [ ] weakness [ ] numbness  Psychiatric:           [ ] negative [ ] anxiety [ ] depression  Endocrine:            [ ] negative [ ] diabetes [ ] thyroid problem  Heme/Lymph:      [ ] negative [ ] anemia [ ] bleeding problem  Allergic/Immune: [ ] negative [ ] itchy eyes [ ] nasal discharge [ ] hives [ ] angioedema    [ ] All other systems negative  [ ] Unable to assess ROS due to    Current Meds:  aspirin enteric coated 81 milliGRAM(s) Oral daily  atorvastatin 20 milliGRAM(s) Oral at bedtime  clopidogrel Tablet 75 milliGRAM(s) Oral daily  dextrose 40% Gel 15 Gram(s) Oral once  dextrose 5%. 1000 milliLiter(s) IV Continuous <Continuous>  dextrose 5%. 1000 milliLiter(s) IV Continuous <Continuous>  dextrose 50% Injectable 25 Gram(s) IV Push once  dextrose 50% Injectable 12.5 Gram(s) IV Push once  dextrose 50% Injectable 25 Gram(s) IV Push once  glucagon  Injectable 1 milliGRAM(s) IntraMuscular once  insulin lispro (ADMELOG) corrective regimen sliding scale   SubCutaneous three times a day before meals  insulin lispro (ADMELOG) corrective regimen sliding scale   SubCutaneous at bedtime  losartan 50 milliGRAM(s) Oral daily  metoprolol succinate  milliGRAM(s) Oral daily  pantoprazole    Tablet 40 milliGRAM(s) Oral before breakfast  ranolazine 1000 milliGRAM(s) Oral two times a day  senna 2 Tablet(s) Oral at bedtime PRN  tamsulosin 0.4 milliGRAM(s) Oral at bedtime  zolpidem 5 milliGRAM(s) Oral at bedtime PRN  zolpidem 5 milliGRAM(s) Oral at bedtime PRN      PAST MEDICAL & SURGICAL HISTORY:  HTN    Diabetes Mellitus  with neuropathy    GERD (Gastroesophageal Reflux Disease)    Heart Attack  1998 with stent and 2001 with stent, stents again in approx 2006 and 2/08    CAD (Coronary Artery Disease)    Coronary Stent  from 1998 - 2020    Hypercholesterolemia    H/O: GI Bleed  post colonoscopy in 2010, treated with blood transfusion.    History of coronary angiogram  in July of 2011 with STENTS to RPDA &amp; RCA    Hemorrhoids    No Past Surgical History    Stented coronary artery  x 7 ( Last PCI in 2016)    H/O gastric bypass        Vitals:  T(F): 97.7 (05-13), Max: 98 (05-12)  HR: 64 (05-13) (53 - 82)  BP: 124/75 (05-13) (123/75 - 172/88)  RR: 17 (05-13)  SpO2: 94% (05-13)  I&O's Summary    12 May 2021 07:01  -  13 May 2021 06:21  --------------------------------------------------------  IN: 120 mL / OUT: 0 mL / NET: 120 mL        Physical Exam:  Appearance: No acute distress; well appearing  Eyes: PERRL, EOMI, pink conjunctiva  HENT: Normal oral mucosa  Cardiovascular: RRR, S1, S2, no murmurs, rubs, or gallops; no edema; no JVD  Respiratory: Clear to auscultation bilaterally  Gastrointestinal: soft, non-tender, non-distended with normal bowel sounds  Musculoskeletal: No clubbing; no joint deformity   Neurologic: Non-focal  Lymphatic: No lymphadenopathy  Psychiatry: AAOx3, mood & affect appropriate  Skin: No rashes, ecchymoses, or cyanosis                          14.3   6.95  )-----------( 195      ( 13 May 2021 00:17 )             42.6     05-13    138  |  103  |  13  ----------------------------<  127<H>  3.9   |  21<L>  |  0.89    Ca    9.7      13 May 2021 00:17  Mg     1.7     05-12    TPro  7.6  /  Alb  4.8  /  TBili  0.4  /  DBili  x   /  AST  27  /  ALT  32  /  AlkPhos  51  05-12    PT/INR - ( 12 May 2021 07:15 )   PT: 11.4 sec;   INR: 0.95 ratio         PTT - ( 12 May 2021 07:15 )  PTT:36.1 sec  CARDIAC MARKERS ( 12 May 2021 19:21 )  x     / x     / 61 U/L / x     / 1.8 ng/mL      Serum Pro-Brain Natriuretic Peptide: 100 pg/mL (05-12 @ 07:15)    Cath]  LM:   --  LM: Angiography showed minor luminal irregularities with no flow  limiting lesions.  LAD:   --  LAD: Angiography showed minor luminal irregularities with no  flow limiting lesions.  --  Mid LAD: There was a 10 % stenosis at the site of a prior stent.  CX:   --  Circumflex: Angiography showed minor luminal irregularities with  no flow limiting lesions.  RCA:   --  Mid RCA: There was a 10 % stenosis at the site of a prior stent.  COMPLICATIONS: There were no complications.  DIAGNOSTIC RECOMMENDATIONS: The patient should continue with the present  medications.      New ECG(s): Personally reviewed    Echo:    Stress Testing:     Cath:    Imaging:    Interpretation of Telemetry:

## 2021-05-13 NOTE — PROGRESS NOTE ADULT - SUBJECTIVE AND OBJECTIVE BOX
Hi Paulson MD  Pager [phone]  Spectra [phone]  Cell Phone [phone]    Patient is a 65y old  Male who presents with a chief complaint of chest pain (13 May 2021 06:20)        SUBJECTIVE / OVERNIGHT EVENTS: Patient without complaints. Denies CP/SOB      MEDICATIONS  (STANDING):  aspirin enteric coated 81 milliGRAM(s) Oral daily  atorvastatin 20 milliGRAM(s) Oral at bedtime  clopidogrel Tablet 75 milliGRAM(s) Oral daily  dextrose 40% Gel 15 Gram(s) Oral once  dextrose 5%. 1000 milliLiter(s) (50 mL/Hr) IV Continuous <Continuous>  dextrose 5%. 1000 milliLiter(s) (100 mL/Hr) IV Continuous <Continuous>  dextrose 50% Injectable 25 Gram(s) IV Push once  dextrose 50% Injectable 12.5 Gram(s) IV Push once  dextrose 50% Injectable 25 Gram(s) IV Push once  glucagon  Injectable 1 milliGRAM(s) IntraMuscular once  insulin lispro (ADMELOG) corrective regimen sliding scale   SubCutaneous three times a day before meals  insulin lispro (ADMELOG) corrective regimen sliding scale   SubCutaneous at bedtime  losartan 50 milliGRAM(s) Oral daily  metoprolol succinate  milliGRAM(s) Oral daily  pantoprazole    Tablet 40 milliGRAM(s) Oral before breakfast  ranolazine 1000 milliGRAM(s) Oral two times a day  tamsulosin 0.4 milliGRAM(s) Oral at bedtime    MEDICATIONS  (PRN):  senna 2 Tablet(s) Oral at bedtime PRN Constipation  zolpidem 5 milliGRAM(s) Oral at bedtime PRN Insomnia  zolpidem 5 milliGRAM(s) Oral at bedtime PRN Insomnia      Vital Signs Last 24 Hrs  T(C): 36.7 (13 May 2021 09:07), Max: 36.7 (13 May 2021 09:07)  T(F): 98 (13 May 2021 09:07), Max: 98 (13 May 2021 09:07)  HR: 59 (13 May 2021 11:53) (52 - 79)  BP: 116/76 (13 May 2021 11:53) (114/69 - 168/95)  BP(mean): 90 (13 May 2021 04:30) (84 - 114)  RR: 18 (13 May 2021 11:53) (17 - 18)  SpO2: 97% (13 May 2021 11:53) (94% - 98%)  CAPILLARY BLOOD GLUCOSE      POCT Blood Glucose.: 128 mg/dL (13 May 2021 11:05)  POCT Blood Glucose.: 133 mg/dL (13 May 2021 06:57)  POCT Blood Glucose.: 138 mg/dL (12 May 2021 21:03)  POCT Blood Glucose.: 122 mg/dL (12 May 2021 15:54)    I&O's Summary    12 May 2021 07:01  -  13 May 2021 07:00  --------------------------------------------------------  IN: 120 mL / OUT: 0 mL / NET: 120 mL    13 May 2021 07:01  -  13 May 2021 12:50  --------------------------------------------------------  IN: 240 mL / OUT: 0 mL / NET: 240 mL          PHYSICAL EXAM  GENERAL: NAD, well-developed  HEAD:  Atraumatic, Normocephalic  EYES: EOMI, PERRLA, conjunctiva and sclera clear  CHEST/LUNG: Clear to auscultation bilaterally; No wheeze  HEART: Regular rate and rhythm; No murmurs, rubs, or gallops  ABDOMEN: Soft, Nontender, Nondistended; Bowel sounds present  EXTREMITIES:  2+ Peripheral Pulses, No clubbing, cyanosis, or edema  PSYCH: AAOx3      LABS:                        14.3   6.95  )-----------( 195      ( 13 May 2021 00:17 )             42.6     05-13    138  |  103  |  13  ----------------------------<  127<H>  3.9   |  21<L>  |  0.89    Ca    9.7      13 May 2021 00:17  Mg     1.7     05-12    TPro  7.6  /  Alb  4.8  /  TBili  0.4  /  DBili  x   /  AST  27  /  ALT  32  /  AlkPhos  51  05-12    PT/INR - ( 12 May 2021 07:15 )   PT: 11.4 sec;   INR: 0.95 ratio         PTT - ( 12 May 2021 07:15 )  PTT:36.1 sec  CARDIAC MARKERS ( 12 May 2021 19:21 )  x     / x     / 61 U/L / x     / 1.8 ng/mL            RADIOLOGY & ADDITIONAL TESTS:    Imaging Personally Reviewed:  Consultant(s) Notes Reviewed:    Care Discussed with Consultants/Other Providers:

## 2021-05-13 NOTE — PROGRESS NOTE ADULT - ASSESSMENT
66 yo m with significant cardiac history with multiple stents, DM2, HTN, HLD, GERD presented with angina cath revealed patent stents and non-obstructive disease
66 y/o m with pmhx HTN HLD, DM on insulin pump, CAD s/p multiple PCI (prior in 1998, 2001, 2006, 2008, 2011, 2015, RCA 2017, pLAD 2020) GERD, presents for approx 6 days of chest pressure/SOB not related to exertion. LHC on 5/12 with no obstructing coronary lesions    #Chest pain- LHC with no obstructive lesions  -continue  ASA, plavix  -continue risk factor modification (enforce heart healthy diet and exercise)  -continue lipitor, ranolazine, and losartan  -continue outpatient cardiology follow up  -no further inpatient cardiology work up needed    Venancio Saucedo MD  Cardiology Fellow PGY-4  [phone]  All Cardiology service information can be found 24/7 on [email or web address], password: [employer] [address] [email or web address]

## 2021-06-27 NOTE — PRE-OP CHECKLIST - IDENTIFICATION BAND VERIFIED
10: 02AM - CM received call from ED requesting assistance with setting up transportation home for pt who has Medicaid. CM attempted to arrange through [address], but unable to do so with pt's Medicaid information. CM called [facility] [employer] ([phone]) to setup ambulatory cab transportation. Currently on hold waiting to speak with agent. 10:10AM - Medicaid Cab transportation arranged (#[id number]). Once they find a transportation company, they will call the ED .  Company will call [employer] ([phone]) with details on arrival.     Mala Carranza, [address] [facility]  [phone]
done

## 2022-03-02 NOTE — PATIENT PROFILE ADULT - ANY SIGNIFICANT CHANGE IN ABILITY TO PERFORM THE FOLLOWING ADL SINCE THE ONSET OF PRESENT ILLNESS?
Continue mechanical ventilation. Not a candidate for weaning.  FIO2 remains at 60%.  Continue to monitor oxygen saturation.  Repeat CXR shows worsening infiltrates  Last day of  albumin 25% every 6 hours followed by lasix. no

## 2022-03-19 ENCOUNTER — EMERGENCY (EMERGENCY)
Facility: HOSPITAL | Age: 67
LOS: 1 days | Discharge: HOME | End: 2022-03-19
Attending: EMERGENCY MEDICINE
Payer: MEDICARE

## 2022-03-19 VITALS
RESPIRATION RATE: 19 BRPM | SYSTOLIC BLOOD PRESSURE: 130 MMHG | HEART RATE: 62 BPM | OXYGEN SATURATION: 95 % | DIASTOLIC BLOOD PRESSURE: 79 MMHG | TEMPERATURE: 98 F

## 2022-03-19 VITALS
DIASTOLIC BLOOD PRESSURE: 84 MMHG | HEIGHT: 71 IN | HEART RATE: 65 BPM | WEIGHT: 205.03 LBS | SYSTOLIC BLOOD PRESSURE: 136 MMHG | TEMPERATURE: 98 F | RESPIRATION RATE: 18 BRPM | OXYGEN SATURATION: 97 %

## 2022-03-19 DIAGNOSIS — Z95.5 PRESENCE OF CORONARY ANGIOPLASTY IMPLANT AND GRAFT: Chronic | ICD-10-CM

## 2022-03-19 DIAGNOSIS — Z98.84 BARIATRIC SURGERY STATUS: Chronic | ICD-10-CM

## 2022-03-19 LAB
ALBUMIN SERPL ELPH-MCNC: 4.5 G/DL — SIGNIFICANT CHANGE UP (ref 3.3–5)
ALP SERPL-CCNC: 52 U/L — SIGNIFICANT CHANGE UP (ref 40–120)
ALT FLD-CCNC: 22 U/L — SIGNIFICANT CHANGE UP (ref 10–45)
ANION GAP SERPL CALC-SCNC: 14 MMOL/L — SIGNIFICANT CHANGE UP (ref 5–17)
APPEARANCE UR: CLEAR — SIGNIFICANT CHANGE UP
AST SERPL-CCNC: 33 U/L — SIGNIFICANT CHANGE UP (ref 10–40)
BACTERIA # UR AUTO: NEGATIVE — SIGNIFICANT CHANGE UP
BASOPHILS # BLD AUTO: 0.04 K/UL — SIGNIFICANT CHANGE UP (ref 0–0.2)
BASOPHILS NFR BLD AUTO: 0.5 % — SIGNIFICANT CHANGE UP (ref 0–2)
BILIRUB SERPL-MCNC: 0.4 MG/DL — SIGNIFICANT CHANGE UP (ref 0.2–1.2)
BILIRUB UR-MCNC: ABNORMAL
BUN SERPL-MCNC: 12 MG/DL — SIGNIFICANT CHANGE UP (ref 7–23)
CALCIUM SERPL-MCNC: 9.4 MG/DL — SIGNIFICANT CHANGE UP (ref 8.4–10.5)
CHLORIDE SERPL-SCNC: 106 MMOL/L — SIGNIFICANT CHANGE UP (ref 96–108)
CO2 SERPL-SCNC: 21 MMOL/L — LOW (ref 22–31)
COLOR SPEC: YELLOW — SIGNIFICANT CHANGE UP
COMMENT - URINE: SIGNIFICANT CHANGE UP
CREAT SERPL-MCNC: 0.88 MG/DL — SIGNIFICANT CHANGE UP (ref 0.5–1.3)
DIFF PNL FLD: ABNORMAL
EGFR: 95 ML/MIN/1.73M2 — SIGNIFICANT CHANGE UP
EOSINOPHIL # BLD AUTO: 0.07 K/UL — SIGNIFICANT CHANGE UP (ref 0–0.5)
EOSINOPHIL NFR BLD AUTO: 0.9 % — SIGNIFICANT CHANGE UP (ref 0–6)
EPI CELLS # UR: 1 /HPF — SIGNIFICANT CHANGE UP
GLUCOSE SERPL-MCNC: 177 MG/DL — HIGH (ref 70–99)
GLUCOSE UR QL: NEGATIVE — SIGNIFICANT CHANGE UP
HCT VFR BLD CALC: 41.7 % — SIGNIFICANT CHANGE UP (ref 39–50)
HGB BLD-MCNC: 13.5 G/DL — SIGNIFICANT CHANGE UP (ref 13–17)
HYALINE CASTS # UR AUTO: 24 /LPF — HIGH (ref 0–2)
IMM GRANULOCYTES NFR BLD AUTO: 0.5 % — SIGNIFICANT CHANGE UP (ref 0–1.5)
KETONES UR-MCNC: SIGNIFICANT CHANGE UP
LEUKOCYTE ESTERASE UR-ACNC: NEGATIVE — SIGNIFICANT CHANGE UP
LYMPHOCYTES # BLD AUTO: 0.95 K/UL — LOW (ref 1–3.3)
LYMPHOCYTES # BLD AUTO: 11.8 % — LOW (ref 13–44)
MCHC RBC-ENTMCNC: 29.6 PG — SIGNIFICANT CHANGE UP (ref 27–34)
MCHC RBC-ENTMCNC: 32.4 GM/DL — SIGNIFICANT CHANGE UP (ref 32–36)
MCV RBC AUTO: 91.4 FL — SIGNIFICANT CHANGE UP (ref 80–100)
MONOCYTES # BLD AUTO: 0.52 K/UL — SIGNIFICANT CHANGE UP (ref 0–0.9)
MONOCYTES NFR BLD AUTO: 6.5 % — SIGNIFICANT CHANGE UP (ref 2–14)
NEUTROPHILS # BLD AUTO: 6.41 K/UL — SIGNIFICANT CHANGE UP (ref 1.8–7.4)
NEUTROPHILS NFR BLD AUTO: 79.8 % — HIGH (ref 43–77)
NITRITE UR-MCNC: NEGATIVE — SIGNIFICANT CHANGE UP
NRBC # BLD: 0 /100 WBCS — SIGNIFICANT CHANGE UP (ref 0–0)
PH UR: 5 — SIGNIFICANT CHANGE UP (ref 5–8)
PLATELET # BLD AUTO: 187 K/UL — SIGNIFICANT CHANGE UP (ref 150–400)
POTASSIUM SERPL-MCNC: 5.1 MMOL/L — SIGNIFICANT CHANGE UP (ref 3.5–5.3)
POTASSIUM SERPL-SCNC: 5.1 MMOL/L — SIGNIFICANT CHANGE UP (ref 3.5–5.3)
PROT SERPL-MCNC: 7 G/DL — SIGNIFICANT CHANGE UP (ref 6–8.3)
PROT UR-MCNC: ABNORMAL
RBC # BLD: 4.56 M/UL — SIGNIFICANT CHANGE UP (ref 4.2–5.8)
RBC # FLD: 13 % — SIGNIFICANT CHANGE UP (ref 10.3–14.5)
RBC CASTS # UR COMP ASSIST: 4 /HPF — SIGNIFICANT CHANGE UP (ref 0–4)
SODIUM SERPL-SCNC: 141 MMOL/L — SIGNIFICANT CHANGE UP (ref 135–145)
SP GR SPEC: 1.03 — HIGH (ref 1.01–1.02)
UROBILINOGEN FLD QL: NEGATIVE — SIGNIFICANT CHANGE UP
WBC # BLD: 8.03 K/UL — SIGNIFICANT CHANGE UP (ref 3.8–10.5)
WBC # FLD AUTO: 8.03 K/UL — SIGNIFICANT CHANGE UP (ref 3.8–10.5)
WBC UR QL: 2 /HPF — SIGNIFICANT CHANGE UP (ref 0–5)

## 2022-03-19 PROCEDURE — 99284 EMERGENCY DEPT VISIT MOD MDM: CPT | Mod: 25

## 2022-03-19 PROCEDURE — 85025 COMPLETE CBC W/AUTO DIFF WBC: CPT

## 2022-03-19 PROCEDURE — 81001 URINALYSIS AUTO W/SCOPE: CPT

## 2022-03-19 PROCEDURE — 80053 COMPREHEN METABOLIC PANEL: CPT

## 2022-03-19 PROCEDURE — 96361 HYDRATE IV INFUSION ADD-ON: CPT

## 2022-03-19 PROCEDURE — 96374 THER/PROPH/DIAG INJ IV PUSH: CPT

## 2022-03-19 PROCEDURE — 76770 US EXAM ABDO BACK WALL COMP: CPT | Mod: 26

## 2022-03-19 PROCEDURE — 76770 US EXAM ABDO BACK WALL COMP: CPT

## 2022-03-19 PROCEDURE — 87086 URINE CULTURE/COLONY COUNT: CPT

## 2022-03-19 PROCEDURE — 99285 EMERGENCY DEPT VISIT HI MDM: CPT

## 2022-03-19 RX ORDER — KETOROLAC TROMETHAMINE 30 MG/ML
15 SYRINGE (ML) INJECTION ONCE
Refills: 0 | Status: DISCONTINUED | OUTPATIENT
Start: 2022-03-19 | End: 2022-03-19

## 2022-03-19 RX ORDER — TAMSULOSIN HYDROCHLORIDE 0.4 MG/1
1 CAPSULE ORAL
Qty: 30 | Refills: 0
Start: 2022-03-19 | End: 2022-04-17

## 2022-03-19 RX ORDER — SODIUM CHLORIDE 9 MG/ML
1000 INJECTION INTRAMUSCULAR; INTRAVENOUS; SUBCUTANEOUS ONCE
Refills: 0 | Status: COMPLETED | OUTPATIENT
Start: 2022-03-19 | End: 2022-03-19

## 2022-03-19 RX ADMIN — Medication 15 MILLIGRAM(S): at 12:44

## 2022-03-19 RX ADMIN — SODIUM CHLORIDE 1000 MILLILITER(S): 9 INJECTION INTRAMUSCULAR; INTRAVENOUS; SUBCUTANEOUS at 11:46

## 2022-03-19 RX ADMIN — Medication 15 MILLIGRAM(S): at 11:46

## 2022-03-19 RX ADMIN — SODIUM CHLORIDE 1000 MILLILITER(S): 9 INJECTION INTRAMUSCULAR; INTRAVENOUS; SUBCUTANEOUS at 12:44

## 2022-03-19 NOTE — ED PROVIDER NOTE - PROGRESS NOTE DETAILS
Josiah, PGY2: bedside ultrasound bladder <8 cc of urine. likely spasm 2/2 renal colic. Josiah, PGY2: patient pain resolved. updated on lab and radiology findings. will follow up outpatient with private urologist.

## 2022-03-19 NOTE — ED ADULT NURSE NOTE - OBJECTIVE STATEMENT
Patient  is  alert and  oriented x3. Color is good and skin warm to touch.  He is  c/o  right flank pain with radiation to  abdomen and dysuria.  He  is  also  c/o oliguria. He denies nausea or  vomiting.

## 2022-03-19 NOTE — ED PROVIDER NOTE - CLINICAL SUMMARY MEDICAL DECISION MAKING FREE TEXT BOX
65 yo M with hx of nephrolithiasis, CAD with multiple stents, DM2, HTN, HLD, GERD presenting with R flank pain and difficulty urinating since last night. Uncomfortable, nontoxic appearing, R flank tenderness, otherwise benign abdomen. Bladder nearly empty on bedside us. Plan for labs, urine, IV hydration, US to eval for hydro, symptom control and reassess.

## 2022-03-19 NOTE — ED PROVIDER NOTE - ATTENDING CONTRIBUTION TO CARE
R flank pain similar to prior renal colic, reporting feeling of need to urinate but unable to, abdomen benign, bladder scan noting no urine in bladder so suspect spasm related to stone - plan for labs, UA, pain control, renal US, likely outpatient follow up with urology.

## 2022-03-19 NOTE — ED PROVIDER NOTE - NS ED ROS FT
Constitutional:  (-) fever, (-) chills, (-) fatigue  Eyes:  (-) eye pain (-) visual changes  ENMT: (-) nasal discharge, (-) sore throat. (-) neck pain or stiffness  Cardiac: (-) chest pain (-) palpitations  Respiratory:  (-) cough (-) shortness of breath  GI:  (-) nausea (-) vomiting (-) diarrhea (-) abdominal pain  :  (+) difficulty urinating (-) dysuria (-) frequency (-) burning  MS:  (+) back pain (-) joint pain  Neuro:  (-) headache (-) numbness (-) tingling (-) focal weakness  Skin:  (-) rash  Except as documented in the HPI,  all other systems are negative

## 2022-03-19 NOTE — ED ADULT TRIAGE NOTE - CHIEF COMPLAINT QUOTE
Pt presents to ED with c/o urinary retention, urgency, and back pain since last night.  Denies fevers.

## 2022-03-19 NOTE — ED PROVIDER NOTE - NSFOLLOWUPINSTRUCTIONS_ED_ALL_ED_FT
You were evaluated in the Emergency Department for flank pain and difficulty urinating. You had an ultrasound performed with a likely diagnosis of kidney stone.      There are no signs of emergency conditions requiring admission to the hospital on today's workup.      Please continue to take all of your medications as prescribed and take the medication FLOMAX as directed.     We recommend that you see your UROLOGIST within the next 3 days for follow up.  Bring a copy of your discharge paperwork (including any test results) to your doctor.    We recommend that you see your primary care physician within the next 3 days for follow up.  Bring a copy of your discharge paperwork (including any test results) to your doctor.    ***Return to the emergency department if you have any other new/concerning symptoms, including but not limited to: worsening pain, vomiting, fevers***

## 2022-03-19 NOTE — ED PROVIDER NOTE - PATIENT PORTAL LINK FT
You can access the FollowMyHealth Patient Portal offered by [facility] by registering at the following website: [email or web address]. By joining [employer]’s FollowMyHealth portal, you will also be able to view your health information using other applications (apps) compatible with our system.

## 2022-03-19 NOTE — ED PROVIDER NOTE - OBJECTIVE STATEMENT
65 yo M with hx of nephrolithiasis, CAD with multiple stents, DM2, HTN, HLD, GERD presenting with R flank pain and difficulty urinating since last night. Feels similar to previous stone. He was scheduled at that time for urology intervention, ended up passing stone without intervention. Took Tylenol this morning with minimal relief. Denies hematuria, dysuria, fevers/chills, n/v. No recent trauma/injury, midline spinal tenderness, saddle anesthesia. 67 yo M with hx of nephrolithiasis, CAD with multiple stents, DM2, HTN, HLD, GERD presenting with R flank pain and difficulty urinating since last night. Intermittent sharp/crampy R lower back pain radiating to the groin. Feels similar to previous stone. Feels urge to urinate but unable to. He was scheduled for urology intervention for stone last year, ended up passing stone without intervention. Took Tylenol this morning with minimal relief. Denies hematuria, dysuria, fevers/chills, n/v. No recent trauma/injury, midline spinal tenderness, saddle anesthesia.

## 2022-03-19 NOTE — ED PROVIDER NOTE - PHYSICAL EXAMINATION
Gen: NAD, AAOx3, non-toxic appearing  HEENT: NCAT, normal conjunctiva, oral mucosa moist  Lung: speaking in full sentences, good aeration bilaterally, lungs CTA b/l  CV: regular rate and rhythm. cap refill <2x. peripheral pulses 2+bilaterally   Abd: soft, ND, NT  MSK: R lower back tenderness, no midline spinal tenderness.   Neuro: No focal deficits  Skin: Intact  Psych: normal affect Gen: NAD, AAOx3, uncomfortable, non-toxic appearing  HEENT: NCAT, normal conjunctiva, oral mucosa moist  Lung: speaking in full sentences, good aeration bilaterally, lungs CTA b/l  CV: regular rate and rhythm. cap refill <2x. peripheral pulses 2+bilaterally   Abd: soft, ND, NT  MSK: R lower back tenderness, no midline spinal tenderness.   Neuro: No focal deficits  Skin: Intact  Psych: normal affect

## 2022-03-19 NOTE — ED PROVIDER NOTE - NSICDXPASTMEDICALHX_GEN_ALL_CORE_FT
PAST MEDICAL HISTORY:  CAD (Coronary Artery Disease)     Coronary Stent from 1998 - 2020    Diabetes Mellitus with neuropathy    GERD (Gastroesophageal Reflux Disease)     H/O: GI Bleed post colonoscopy in 2010, treated with blood transfusion.    Heart Attack 1998 with stent and 2001 with stent, stents again in approx 2006 and 2/08    Hemorrhoids     History of coronary angiogram in July of 2011 with STENTS to RPDA & RCA    HTN     Hypercholesterolemia

## 2022-03-20 LAB
CULTURE RESULTS: SIGNIFICANT CHANGE UP
SPECIMEN SOURCE: SIGNIFICANT CHANGE UP

## 2022-08-15 ENCOUNTER — NON-APPOINTMENT (OUTPATIENT)
Age: 67
End: 2022-08-15

## 2022-08-17 ENCOUNTER — NON-APPOINTMENT (OUTPATIENT)
Age: 67
End: 2022-08-17

## 2022-08-19 ENCOUNTER — OUTPATIENT (OUTPATIENT)
Dept: OUTPATIENT SERVICES | Facility: HOSPITAL | Age: 67
LOS: 1 days | End: 2022-08-19
Payer: MEDICARE

## 2022-08-19 ENCOUNTER — TRANSCRIPTION ENCOUNTER (OUTPATIENT)
Age: 67
End: 2022-08-19

## 2022-08-19 VITALS
RESPIRATION RATE: 18 BRPM | TEMPERATURE: 98 F | HEIGHT: 71 IN | HEART RATE: 62 BPM | DIASTOLIC BLOOD PRESSURE: 70 MMHG | SYSTOLIC BLOOD PRESSURE: 142 MMHG | WEIGHT: 223.99 LBS | OXYGEN SATURATION: 97 %

## 2022-08-19 VITALS
OXYGEN SATURATION: 95 % | DIASTOLIC BLOOD PRESSURE: 74 MMHG | HEART RATE: 68 BPM | RESPIRATION RATE: 16 BRPM | SYSTOLIC BLOOD PRESSURE: 128 MMHG

## 2022-08-19 DIAGNOSIS — R07.89 OTHER CHEST PAIN: ICD-10-CM

## 2022-08-19 DIAGNOSIS — Z98.84 BARIATRIC SURGERY STATUS: Chronic | ICD-10-CM

## 2022-08-19 DIAGNOSIS — Z95.5 PRESENCE OF CORONARY ANGIOPLASTY IMPLANT AND GRAFT: Chronic | ICD-10-CM

## 2022-08-19 LAB
ANION GAP SERPL CALC-SCNC: 12 MMOL/L — SIGNIFICANT CHANGE UP (ref 5–17)
BUN SERPL-MCNC: 10 MG/DL — SIGNIFICANT CHANGE UP (ref 7–23)
CALCIUM SERPL-MCNC: 9.6 MG/DL — SIGNIFICANT CHANGE UP (ref 8.4–10.5)
CHLORIDE SERPL-SCNC: 103 MMOL/L — SIGNIFICANT CHANGE UP (ref 96–108)
CO2 SERPL-SCNC: 25 MMOL/L — SIGNIFICANT CHANGE UP (ref 22–31)
CREAT SERPL-MCNC: 0.9 MG/DL — SIGNIFICANT CHANGE UP (ref 0.5–1.3)
EGFR: 95 ML/MIN/1.73M2 — SIGNIFICANT CHANGE UP
GLUCOSE BLDC GLUCOMTR-MCNC: 192 MG/DL — HIGH (ref 70–99)
GLUCOSE SERPL-MCNC: 179 MG/DL — HIGH (ref 70–99)
HCT VFR BLD CALC: 43.2 % — SIGNIFICANT CHANGE UP (ref 39–50)
HGB BLD-MCNC: 14.4 G/DL — SIGNIFICANT CHANGE UP (ref 13–17)
MCHC RBC-ENTMCNC: 29.4 PG — SIGNIFICANT CHANGE UP (ref 27–34)
MCHC RBC-ENTMCNC: 33.3 GM/DL — SIGNIFICANT CHANGE UP (ref 32–36)
MCV RBC AUTO: 88.2 FL — SIGNIFICANT CHANGE UP (ref 80–100)
NRBC # BLD: 0 /100 WBCS — SIGNIFICANT CHANGE UP (ref 0–0)
PLATELET # BLD AUTO: 180 K/UL — SIGNIFICANT CHANGE UP (ref 150–400)
POTASSIUM SERPL-MCNC: 4.1 MMOL/L — SIGNIFICANT CHANGE UP (ref 3.5–5.3)
POTASSIUM SERPL-SCNC: 4.1 MMOL/L — SIGNIFICANT CHANGE UP (ref 3.5–5.3)
RBC # BLD: 4.9 M/UL — SIGNIFICANT CHANGE UP (ref 4.2–5.8)
RBC # FLD: 13.1 % — SIGNIFICANT CHANGE UP (ref 10.3–14.5)
SODIUM SERPL-SCNC: 140 MMOL/L — SIGNIFICANT CHANGE UP (ref 135–145)
WBC # BLD: 5.97 K/UL — SIGNIFICANT CHANGE UP (ref 3.8–10.5)
WBC # FLD AUTO: 5.97 K/UL — SIGNIFICANT CHANGE UP (ref 3.8–10.5)

## 2022-08-19 PROCEDURE — 82962 GLUCOSE BLOOD TEST: CPT

## 2022-08-19 PROCEDURE — 93005 ELECTROCARDIOGRAM TRACING: CPT

## 2022-08-19 PROCEDURE — 93458 L HRT ARTERY/VENTRICLE ANGIO: CPT

## 2022-08-19 PROCEDURE — 93010 ELECTROCARDIOGRAM REPORT: CPT

## 2022-08-19 PROCEDURE — C1769: CPT

## 2022-08-19 PROCEDURE — 93571 IV DOP VEL&/PRESS C FLO 1ST: CPT | Mod: 52,LD

## 2022-08-19 PROCEDURE — 85027 COMPLETE CBC AUTOMATED: CPT

## 2022-08-19 PROCEDURE — C1887: CPT

## 2022-08-19 PROCEDURE — 93458 L HRT ARTERY/VENTRICLE ANGIO: CPT | Mod: 26

## 2022-08-19 PROCEDURE — C1894: CPT

## 2022-08-19 PROCEDURE — 80048 BASIC METABOLIC PNL TOTAL CA: CPT

## 2022-08-19 PROCEDURE — 93571 IV DOP VEL&/PRESS C FLO 1ST: CPT | Mod: 26,52,LD

## 2022-08-19 PROCEDURE — 99152 MOD SED SAME PHYS/QHP 5/>YRS: CPT

## 2022-08-19 RX ORDER — DOCUSATE SODIUM 100 MG
1 CAPSULE ORAL
Qty: 0 | Refills: 0 | DISCHARGE

## 2022-08-19 RX ORDER — OLMESARTAN MEDOXOMIL 5 MG/1
1 TABLET, FILM COATED ORAL
Qty: 0 | Refills: 0 | DISCHARGE

## 2022-08-19 RX ORDER — SODIUM CHLORIDE 9 MG/ML
3 INJECTION INTRAMUSCULAR; INTRAVENOUS; SUBCUTANEOUS EVERY 8 HOURS
Refills: 0 | Status: DISCONTINUED | OUTPATIENT
Start: 2022-08-19 | End: 2022-09-02

## 2022-08-19 RX ORDER — METOPROLOL TARTRATE 50 MG
1 TABLET ORAL
Qty: 0 | Refills: 0 | DISCHARGE

## 2022-08-19 RX ORDER — METFORMIN HYDROCHLORIDE 850 MG/1
1 TABLET ORAL
Qty: 0 | Refills: 0 | DISCHARGE

## 2022-08-19 RX ORDER — ATORVASTATIN CALCIUM 80 MG/1
1 TABLET, FILM COATED ORAL
Qty: 0 | Refills: 0 | DISCHARGE

## 2022-08-19 RX ORDER — SODIUM CHLORIDE 9 MG/ML
1000 INJECTION INTRAMUSCULAR; INTRAVENOUS; SUBCUTANEOUS
Refills: 0 | Status: DISCONTINUED | OUTPATIENT
Start: 2022-08-19 | End: 2022-09-02

## 2022-08-19 RX ORDER — ZOLPIDEM TARTRATE 10 MG/1
1 TABLET ORAL
Qty: 0 | Refills: 0 | DISCHARGE

## 2022-08-19 RX ORDER — RANOLAZINE 500 MG/1
1 TABLET, FILM COATED, EXTENDED RELEASE ORAL
Qty: 0 | Refills: 0 | DISCHARGE

## 2022-08-19 RX ADMIN — SODIUM CHLORIDE 250 MILLILITER(S): 9 INJECTION INTRAMUSCULAR; INTRAVENOUS; SUBCUTANEOUS at 08:26

## 2022-08-19 NOTE — ASU DISCHARGE PLAN (ADULT/PEDIATRIC) - NS MD DC FALL RISK RISK
For information on Fall & Injury Prevention, visit: [email or web address] OR  [email or web address] OR  https://www.cdc.gov/steadi/patient.html

## 2022-08-19 NOTE — H&P CARDIOLOGY - HISTORY OF PRESENT ILLNESS
64 yo m with significant cardiac history with multiple stents, DM2, HTN, HLD, GERD presented with left sided chest heaviness radiation to left arm similar to previous episodes when he required interventions. Symptoms started about 6 days ago with left sided heaviness. progressively has worsening with time and activity, unable to ambulate too much due to symptoms. denies SOB or palpitations. afebrile. no cough or fevers. Was scheduled to have outpt [facility] but due to persistence and severity decided to come to ED today.     In ED: VS: 36.5, 131/76, 61, 18, 97% RA.   Patient seen at [facility] lying . still with some vague chest discomfort.  65 yo M with PMHx of CAD with multiple stents, DM2, HTN, HLD, GERD, gastric bypass surgery 3 years ago, former heavy smoker (90p/yr), CHRISTIANE use CPAP prn presented with left sided chest pressure with SOB and dizziness radiating to left arm similar to previous episodes when he required interventions for past several months. Pt was evaluated by Dr. Ac Blue and referred for [facility]. Pt reports he still has mild pressure in his chest, denies SOB or palpitations.    Card: Dr. Ac Blue    < from: Transthoracic Echocardiogram (05.12.21 @ 16:43) >  1. Mitral annular calcification and calcified mitral  leaflets with normal diastolic opening.  2. Aortic valve not well visualized; appears calcified.  3. Normal left ventricular internal dimensions and wall  thicknesses.  4. Low/Normal left ventricular systolic function.  No  obvious or severe wall motion abnormalities.  5. Normal right ventricular size and function.  *** No previous Echo exam.  EF (Visual Estimate): 50-55 %  EF (Teicholtz): 51 %  EF (Saldana Rule): 50 %Doppler Peak Velocity (m/sec):AoV=0.9 < end of copied text >    < from: Cardiac Cath Lab - Adult (05.12.21 @ 14:07) >  CORONARY VESSELS: The coronary circulation is right dominant.  LM:   --  LM: Angiography showed minor luminal irregularities with no flow  limiting lesions.  LAD:   --  LAD: Angiography showed minor luminal irregularities with no  flow limiting lesions.  --  Mid LAD: There was a 10 % stenosis at the site of a prior stent.  CX:   --  Circumflex: Angiography showed minor luminal irregularities with  no flow limiting lesions.  RCA:   --  Mid RCA: There was a 10 % stenosis at the site of a prior stent.  COMPLICATIONS: There were no complications.  DIAGNOSTICRECOMMENDATIONS: The patient should continue with the present  medications.    < end of copied text >

## 2022-08-19 NOTE — ASU DISCHARGE PLAN (ADULT/PEDIATRIC) - ASU DC SPECIAL INSTRUCTIONSFT
Wound Care:   the day AFTER your procedure remove bandage GENTLY, and clean using  mild soap and gentle warm, water stream, pat dry. leave OPEN to air. YOU MAY SHOWER   DO NOT apply lotions, creams, ointments, powder, perfumes to your incision site  DO NOT SOAK your site for 1 week ( no baths, no pools, no tubs, etc...)  Check  your groin and /or wrist daily. A small amount of bruising, and soreness are normal    ACTIVITY: for 24 hours   - DO NOT DRIVE  - DO NOT make any important decisions or sign legal documents   - DO NOT operate heavy machineries   - you may resume sexual activity in 48 hours, unless otherwise instructed by your cardiologist     If your procedure was done through the WRIST: for the NEXT 3DAYS:  - avoid pushing, pulling, with that affected wrist   - avoid repeated movement of that hand and wrist ( e.g: typing, hammering)  - DO NOT LIFT anything more than 5 lbs     If your procedure was done through the GROIN: for the NEXT 5 DAYS  - Limit climbing stairs, DO NOT soak in bathtub or pool  - no strenuous activities, pushing, pulling, straining  - Do not lift anything 10lbs or heavier     MEDICATION:   take your medications as explained ( see discharge paperwork)   If you received a STENT, you will be taking antiplatelet medications to KEEP YOUR STENT OPEN ( e.g: Aspirin, Plavix, Brilinta, Effient, etc).  Take as prescribed DO NOT STOP taking them without consulting with your cardiologist first.     Follow heart healthy diet recommended by your doctor, , if you smoke STOP SMOKING ( may call [phone] for center of tobacco control if you need assistance)     CALL your doctor to make appointment in 2 WEEKS     ***CALL YOUR DOCTOR***  if you experience: fever, chills, body aches, or severe pain, swelling, redness, heat or yellow discharge at incision site  If you experience Bleeding or excruciating pain at the procedural site, swelling ( golf ball size) at your procedural site  If you experience CHEST PAIN  If you experience extremity numbness, tingling, temperature change ( of your procedural site)   If you are unable to reach your doctor, you may contact:   -Cardiology Office at [facility] at [phone] or   - [facility] [phone]  - [facility] [phone]

## 2022-08-19 NOTE — ASU DISCHARGE PLAN (ADULT/PEDIATRIC) - CARE PROVIDER_API CALL
Ac Blue)  Cardiology Medicine  [address] [facility], [place] [address]  Phone: [phone]  Fax: [phone]  Follow Up Time:

## 2022-08-19 NOTE — H&P CARDIOLOGY - NSICDXPASTMEDICALHX_GEN_ALL_CORE_FT
PAST MEDICAL HISTORY:  CAD (Coronary Artery Disease)     Coronary Stent from 1998 - 2020    Diabetes Mellitus with neuropathy    GERD (Gastroesophageal Reflux Disease)     H/O: GI Bleed post colonoscopy in 2010, treated with blood transfusion.    Heart Attack 1998 with stent and 2001 with stent, stents again in approx 2006 and 2/08    Hemorrhoids     History of coronary angiogram in July of 2011 with STENTS to RPDA & RCA    HTN     Hypercholesterolemia      PAST MEDICAL HISTORY:  CAD (Coronary Artery Disease)     Coronary Stent from 1998 - 2020    Diabetes Mellitus with neuropathy    Former smoker     GERD (Gastroesophageal Reflux Disease)     H/O: GI Bleed post colonoscopy in 2010, treated with blood transfusion.    Heart Attack 1998 with stent and 2001 with stent, stents again in approx 2006 and 2/08    Hemorrhoids     History of coronary angiogram in July of 2011 with STENTS to RPDA & RCA    HTN     Hypercholesterolemia     CHRISTIANE (obstructive sleep apnea)

## 2023-07-31 NOTE — ED PROVIDER NOTE - CROS ED CARDIOVAS ALL NEG
Patient with known CLAD/JONELLE.  Dyspnea has been worsening over the last several months. Patient is a DNR.   - - -

## 2023-09-29 NOTE — ED PROVIDER NOTE - PATIENT/CAREGIVER ACCEPTED INTERPRETER SERVICES
Heparin gtt held from 0293-4709 due to uncertainty if PICC was ok to use. Per vascular heparin is ok and they will replace PICC this evening. Heparin gtt restarted at 1400   Noted.   yes

## 2023-10-06 NOTE — DISCHARGE NOTE NURSING/CASE MANAGEMENT/SOCIAL WORK - NSDCFUADDAPPT_GEN_ALL_CORE_FT
Prep Survey      Flowsheet Row Responses   [facility] facility patient discharged from? [facility]   Is LACE score < 7 ? No   Eligibility Not Eligible   What are the reasons patient is not eligible? Behavioral Health   Does the patient have one of the following disease processes/diagnoses(primary or secondary)? Other   Prep survey completed? Yes            KEVIN BOSWELL - Registered Nurse          
Please follow up with your cardiologist in 1-2 weeks.

## 2023-11-29 PROBLEM — G47.33 OBSTRUCTIVE SLEEP APNEA (ADULT) (PEDIATRIC): Chronic | Status: ACTIVE | Noted: 2022-08-19

## 2023-11-29 PROBLEM — Z87.891 PERSONAL HISTORY OF NICOTINE DEPENDENCE: Chronic | Status: ACTIVE | Noted: 2022-08-19

## 2023-12-04 ENCOUNTER — TRANSCRIPTION ENCOUNTER (OUTPATIENT)
Age: 68
End: 2023-12-04

## 2023-12-04 ENCOUNTER — OUTPATIENT (OUTPATIENT)
Dept: OUTPATIENT SERVICES | Facility: HOSPITAL | Age: 68
LOS: 1 days | End: 2023-12-04
Payer: MEDICARE

## 2023-12-04 VITALS
DIASTOLIC BLOOD PRESSURE: 89 MMHG | TEMPERATURE: 98 F | OXYGEN SATURATION: 96 % | RESPIRATION RATE: 17 BRPM | SYSTOLIC BLOOD PRESSURE: 129 MMHG | HEART RATE: 63 BPM

## 2023-12-04 VITALS
TEMPERATURE: 98 F | WEIGHT: 203.93 LBS | OXYGEN SATURATION: 97 % | DIASTOLIC BLOOD PRESSURE: 82 MMHG | HEIGHT: 71 IN | RESPIRATION RATE: 16 BRPM | HEART RATE: 65 BPM | SYSTOLIC BLOOD PRESSURE: 141 MMHG

## 2023-12-04 DIAGNOSIS — Z98.84 BARIATRIC SURGERY STATUS: Chronic | ICD-10-CM

## 2023-12-04 DIAGNOSIS — Z95.5 PRESENCE OF CORONARY ANGIOPLASTY IMPLANT AND GRAFT: Chronic | ICD-10-CM

## 2023-12-04 DIAGNOSIS — R06.02 SHORTNESS OF BREATH: ICD-10-CM

## 2023-12-04 LAB
ANION GAP SERPL CALC-SCNC: 13 MMOL/L — SIGNIFICANT CHANGE UP (ref 5–17)
ANION GAP SERPL CALC-SCNC: 13 MMOL/L — SIGNIFICANT CHANGE UP (ref 5–17)
BUN SERPL-MCNC: 11 MG/DL — SIGNIFICANT CHANGE UP (ref 7–23)
BUN SERPL-MCNC: 11 MG/DL — SIGNIFICANT CHANGE UP (ref 7–23)
CALCIUM SERPL-MCNC: 10 MG/DL — SIGNIFICANT CHANGE UP (ref 8.4–10.5)
CALCIUM SERPL-MCNC: 10 MG/DL — SIGNIFICANT CHANGE UP (ref 8.4–10.5)
CHLORIDE SERPL-SCNC: 104 MMOL/L — SIGNIFICANT CHANGE UP (ref 96–108)
CHLORIDE SERPL-SCNC: 104 MMOL/L — SIGNIFICANT CHANGE UP (ref 96–108)
CO2 SERPL-SCNC: 24 MMOL/L — SIGNIFICANT CHANGE UP (ref 22–31)
CO2 SERPL-SCNC: 24 MMOL/L — SIGNIFICANT CHANGE UP (ref 22–31)
CREAT SERPL-MCNC: 0.75 MG/DL — SIGNIFICANT CHANGE UP (ref 0.5–1.3)
CREAT SERPL-MCNC: 0.75 MG/DL — SIGNIFICANT CHANGE UP (ref 0.5–1.3)
EGFR: 99 ML/MIN/1.73M2 — SIGNIFICANT CHANGE UP
EGFR: 99 ML/MIN/1.73M2 — SIGNIFICANT CHANGE UP
GLUCOSE BLDC GLUCOMTR-MCNC: 104 MG/DL — HIGH (ref 70–99)
GLUCOSE BLDC GLUCOMTR-MCNC: 104 MG/DL — HIGH (ref 70–99)
GLUCOSE BLDC GLUCOMTR-MCNC: 84 MG/DL — SIGNIFICANT CHANGE UP (ref 70–99)
GLUCOSE BLDC GLUCOMTR-MCNC: 84 MG/DL — SIGNIFICANT CHANGE UP (ref 70–99)
GLUCOSE BLDC GLUCOMTR-MCNC: 94 MG/DL — SIGNIFICANT CHANGE UP (ref 70–99)
GLUCOSE BLDC GLUCOMTR-MCNC: 94 MG/DL — SIGNIFICANT CHANGE UP (ref 70–99)
GLUCOSE SERPL-MCNC: 114 MG/DL — HIGH (ref 70–99)
GLUCOSE SERPL-MCNC: 114 MG/DL — HIGH (ref 70–99)
HCT VFR BLD CALC: 42 % — SIGNIFICANT CHANGE UP (ref 39–50)
HCT VFR BLD CALC: 42 % — SIGNIFICANT CHANGE UP (ref 39–50)
HGB BLD-MCNC: 13.6 G/DL — SIGNIFICANT CHANGE UP (ref 13–17)
HGB BLD-MCNC: 13.6 G/DL — SIGNIFICANT CHANGE UP (ref 13–17)
MCHC RBC-ENTMCNC: 28.9 PG — SIGNIFICANT CHANGE UP (ref 27–34)
MCHC RBC-ENTMCNC: 28.9 PG — SIGNIFICANT CHANGE UP (ref 27–34)
MCHC RBC-ENTMCNC: 32.4 GM/DL — SIGNIFICANT CHANGE UP (ref 32–36)
MCHC RBC-ENTMCNC: 32.4 GM/DL — SIGNIFICANT CHANGE UP (ref 32–36)
MCV RBC AUTO: 89.2 FL — SIGNIFICANT CHANGE UP (ref 80–100)
MCV RBC AUTO: 89.2 FL — SIGNIFICANT CHANGE UP (ref 80–100)
NRBC # BLD: 0 /100 WBCS — SIGNIFICANT CHANGE UP (ref 0–0)
NRBC # BLD: 0 /100 WBCS — SIGNIFICANT CHANGE UP (ref 0–0)
PLATELET # BLD AUTO: 173 K/UL — SIGNIFICANT CHANGE UP (ref 150–400)
PLATELET # BLD AUTO: 173 K/UL — SIGNIFICANT CHANGE UP (ref 150–400)
POTASSIUM SERPL-MCNC: 4.1 MMOL/L — SIGNIFICANT CHANGE UP (ref 3.5–5.3)
POTASSIUM SERPL-MCNC: 4.1 MMOL/L — SIGNIFICANT CHANGE UP (ref 3.5–5.3)
POTASSIUM SERPL-SCNC: 4.1 MMOL/L — SIGNIFICANT CHANGE UP (ref 3.5–5.3)
POTASSIUM SERPL-SCNC: 4.1 MMOL/L — SIGNIFICANT CHANGE UP (ref 3.5–5.3)
RBC # BLD: 4.71 M/UL — SIGNIFICANT CHANGE UP (ref 4.2–5.8)
RBC # BLD: 4.71 M/UL — SIGNIFICANT CHANGE UP (ref 4.2–5.8)
RBC # FLD: 13.2 % — SIGNIFICANT CHANGE UP (ref 10.3–14.5)
RBC # FLD: 13.2 % — SIGNIFICANT CHANGE UP (ref 10.3–14.5)
SODIUM SERPL-SCNC: 141 MMOL/L — SIGNIFICANT CHANGE UP (ref 135–145)
SODIUM SERPL-SCNC: 141 MMOL/L — SIGNIFICANT CHANGE UP (ref 135–145)
WBC # BLD: 4.18 K/UL — SIGNIFICANT CHANGE UP (ref 3.8–10.5)
WBC # BLD: 4.18 K/UL — SIGNIFICANT CHANGE UP (ref 3.8–10.5)
WBC # FLD AUTO: 4.18 K/UL — SIGNIFICANT CHANGE UP (ref 3.8–10.5)
WBC # FLD AUTO: 4.18 K/UL — SIGNIFICANT CHANGE UP (ref 3.8–10.5)

## 2023-12-04 PROCEDURE — C1725: CPT

## 2023-12-04 PROCEDURE — 93010 ELECTROCARDIOGRAM REPORT: CPT

## 2023-12-04 PROCEDURE — 93458 L HRT ARTERY/VENTRICLE ANGIO: CPT | Mod: 26,59

## 2023-12-04 PROCEDURE — C9600: CPT | Mod: RC

## 2023-12-04 PROCEDURE — 93005 ELECTROCARDIOGRAM TRACING: CPT

## 2023-12-04 PROCEDURE — 82962 GLUCOSE BLOOD TEST: CPT

## 2023-12-04 PROCEDURE — 85027 COMPLETE CBC AUTOMATED: CPT

## 2023-12-04 PROCEDURE — 93010 ELECTROCARDIOGRAM REPORT: CPT | Mod: 77

## 2023-12-04 PROCEDURE — C1887: CPT

## 2023-12-04 PROCEDURE — C1894: CPT

## 2023-12-04 PROCEDURE — C1769: CPT

## 2023-12-04 PROCEDURE — 92928 PRQ TCAT PLMT NTRAC ST 1 LES: CPT | Mod: RC

## 2023-12-04 PROCEDURE — C1874: CPT

## 2023-12-04 PROCEDURE — 99152 MOD SED SAME PHYS/QHP 5/>YRS: CPT

## 2023-12-04 PROCEDURE — 80048 BASIC METABOLIC PNL TOTAL CA: CPT

## 2023-12-04 PROCEDURE — 93458 L HRT ARTERY/VENTRICLE ANGIO: CPT | Mod: 59

## 2023-12-04 RX ORDER — TAMSULOSIN HYDROCHLORIDE 0.4 MG/1
1 CAPSULE ORAL
Qty: 0 | Refills: 0 | DISCHARGE

## 2023-12-04 RX ORDER — RANOLAZINE 500 MG/1
1 TABLET, FILM COATED, EXTENDED RELEASE ORAL
Refills: 0 | DISCHARGE

## 2023-12-04 RX ORDER — SODIUM CHLORIDE 9 MG/ML
1000 INJECTION INTRAMUSCULAR; INTRAVENOUS; SUBCUTANEOUS
Refills: 0 | Status: DISCONTINUED | OUTPATIENT
Start: 2023-12-04 | End: 2023-12-18

## 2023-12-04 RX ORDER — TAMSULOSIN HYDROCHLORIDE 0.4 MG/1
0.4 CAPSULE ORAL AT BEDTIME
Refills: 0 | Status: DISCONTINUED | OUTPATIENT
Start: 2023-12-04 | End: 2023-12-18

## 2023-12-04 RX ORDER — DEXTROSE 50 % IN WATER 50 %
25 SYRINGE (ML) INTRAVENOUS ONCE
Refills: 0 | Status: DISCONTINUED | OUTPATIENT
Start: 2023-12-04 | End: 2023-12-18

## 2023-12-04 RX ORDER — INSULIN LISPRO 100/ML
VIAL (ML) SUBCUTANEOUS AT BEDTIME
Refills: 0 | Status: DISCONTINUED | OUTPATIENT
Start: 2023-12-04 | End: 2023-12-18

## 2023-12-04 RX ORDER — ROSUVASTATIN CALCIUM 5 MG/1
1 TABLET ORAL
Qty: 0 | Refills: 0 | DISCHARGE

## 2023-12-04 RX ORDER — RANOLAZINE 500 MG/1
1000 TABLET, FILM COATED, EXTENDED RELEASE ORAL
Refills: 0 | Status: DISCONTINUED | OUTPATIENT
Start: 2023-12-04 | End: 2023-12-18

## 2023-12-04 RX ORDER — PANTOPRAZOLE SODIUM 20 MG/1
1 TABLET, DELAYED RELEASE ORAL
Qty: 0 | Refills: 0 | DISCHARGE

## 2023-12-04 RX ORDER — SODIUM CHLORIDE 9 MG/ML
250 INJECTION INTRAMUSCULAR; INTRAVENOUS; SUBCUTANEOUS ONCE
Refills: 0 | Status: COMPLETED | OUTPATIENT
Start: 2023-12-04 | End: 2023-12-04

## 2023-12-04 RX ORDER — INSULIN LISPRO 100/ML
VIAL (ML) SUBCUTANEOUS
Refills: 0 | Status: DISCONTINUED | OUTPATIENT
Start: 2023-12-04 | End: 2023-12-18

## 2023-12-04 RX ORDER — SODIUM CHLORIDE 9 MG/ML
1000 INJECTION, SOLUTION INTRAVENOUS
Refills: 0 | Status: DISCONTINUED | OUTPATIENT
Start: 2023-12-04 | End: 2023-12-18

## 2023-12-04 RX ORDER — METOPROLOL TARTRATE 50 MG
50 TABLET ORAL DAILY
Refills: 0 | Status: DISCONTINUED | OUTPATIENT
Start: 2023-12-04 | End: 2023-12-18

## 2023-12-04 RX ORDER — DEXTROSE 50 % IN WATER 50 %
12.5 SYRINGE (ML) INTRAVENOUS ONCE
Refills: 0 | Status: DISCONTINUED | OUTPATIENT
Start: 2023-12-04 | End: 2023-12-18

## 2023-12-04 RX ORDER — METFORMIN HYDROCHLORIDE 850 MG/1
1 TABLET ORAL
Qty: 0 | Refills: 0 | DISCHARGE

## 2023-12-04 RX ORDER — OLMESARTAN MEDOXOMIL 5 MG/1
1 TABLET, FILM COATED ORAL
Qty: 0 | Refills: 0 | DISCHARGE

## 2023-12-04 RX ORDER — DEXTROSE 50 % IN WATER 50 %
15 SYRINGE (ML) INTRAVENOUS ONCE
Refills: 0 | Status: DISCONTINUED | OUTPATIENT
Start: 2023-12-04 | End: 2023-12-18

## 2023-12-04 RX ORDER — METOPROLOL TARTRATE 50 MG
1 TABLET ORAL
Qty: 0 | Refills: 0 | DISCHARGE

## 2023-12-04 RX ORDER — GLUCAGON INJECTION, SOLUTION 0.5 MG/.1ML
1 INJECTION, SOLUTION SUBCUTANEOUS ONCE
Refills: 0 | Status: DISCONTINUED | OUTPATIENT
Start: 2023-12-04 | End: 2023-12-18

## 2023-12-04 RX ADMIN — SODIUM CHLORIDE 75 MILLILITER(S): 9 INJECTION INTRAMUSCULAR; INTRAVENOUS; SUBCUTANEOUS at 08:43

## 2023-12-04 RX ADMIN — SODIUM CHLORIDE 100 MILLILITER(S): 9 INJECTION INTRAMUSCULAR; INTRAVENOUS; SUBCUTANEOUS at 12:00

## 2023-12-04 RX ADMIN — SODIUM CHLORIDE 750 MILLILITER(S): 9 INJECTION INTRAMUSCULAR; INTRAVENOUS; SUBCUTANEOUS at 08:00

## 2023-12-04 NOTE — CHART NOTE - NSCHARTNOTEFT_GEN_A_CORE
Removal of Femoral Sheath    Pulses in the right/left lower extremity are palpable/audible by doppler/absent.   The patient was placed in the supine position. The insertion site was identified and the sutures were removed per protocol.    The __6__ Croatian femoral sheath was then removed.   Direct pressure was applied for  _20_____ minutes.   Complications: None, VSS, Good Hemostasis.     Monitoring of the right/left groin and both lower extremities including neuro-vascular checks and vital signs every 15 minutes x 4, then every 30 minutes x 2, then every 1 hour was ordered.    Discharge Instruction discussed with patient: ASA, Plavix, statin, diet, activities, access site care, follow up care, reportable signs and symptoms.     A/P   67 yo Male, former cigarette smoker x 90PY with PMHx of  DMT2, HTN, HLD, GERD, gastric bypass surgery 5 years ago,CHRISTIANE use CPAP and CAD with multiple PCI with stents, offering c/o NYHA class III rest left sided chest pressure with dyspnea and dizziness radiating to left arm similar to previous episodes when he required PCI x several months. Patient was evaluated by his Cardiologist, Dr. Ac Blue and given hx and anginal symptomatolgy decision was made to proceed with [facility] for which he presents today.        Plan: continue to monitor, Continue ASA, Plavix, Statin,   discharge home if stable.  Pt will follow up with his/her cardiologist in 1-2weeks. Removal of Femoral Sheath    Pulses in the right/left lower extremity are palpable/audible by doppler/absent.   The patient was placed in the supine position. The insertion site was identified and the sutures were removed per protocol.    The __6__ Turkmen femoral sheath was then removed.   Direct pressure was applied for  _20_____ minutes.   Complications: None, VSS, Good Hemostasis.     Monitoring of the right/left groin and both lower extremities including neuro-vascular checks and vital signs every 15 minutes x 4, then every 30 minutes x 2, then every 1 hour was ordered.    Discharge Instruction discussed with patient: ASA, Plavix, statin, diet, activities, access site care, follow up care, reportable signs and symptoms.     A/P   67 yo Male, former cigarette smoker x 90PY with PMHx of  DMT2, HTN, HLD, GERD, gastric bypass surgery 5 years ago,CHRISTIANE use CPAP and CAD with multiple PCI with stents, offering c/o NYHA class III rest left sided chest pressure with dyspnea and dizziness radiating to left arm similar to previous episodes when he required PCI x several months. Patient was evaluated by his Cardiologist, Dr. Ac Blue and given hx and anginal symptomatolgy decision was made to proceed with [facility] for which he presents today.        Plan: continue to monitor, Continue ASA, Plavix, Statin,   discharge home if stable.  Pt will follow up with his/her cardiologist in 1-2weeks.

## 2023-12-04 NOTE — H&P CARDIOLOGY - HISTORY OF PRESENT ILLNESS
69 yo Male, former cigarette smoker x 90PY with PMHx of  DM2, HTN, HLD, GERD, gastric bypass surgery 5 years ago,CHRISTIANE use CPAP and CAD with multiple PCI with stents, offering c/o NYHA class III rest left sided chest pressure with dyspnea and dizziness radiating to left arm similar to previous episodes when he required PCI x several months. Patient was evaluated by his Cardiologist, Dr. Ac Blue and given hx and anginal symptomatolgy decision was made to proceed with [facility] for which he presents today.     67 yo Male, former cigarette smoker x 90PY with PMHx of  DM2, HTN, HLD, GERD, gastric bypass surgery 5 years ago,CHRISTIANE use CPAP and CAD with multiple PCI with stents, offering c/o NYHA class III rest left sided chest pressure with dyspnea and dizziness radiating to left arm similar to previous episodes when he required PCI x several months. Patient was evaluated by his Cardiologist, Dr. Ac Blue and given hx and anginal symptomatolgy decision was made to proceed with [facility] for which he presents today.

## 2023-12-04 NOTE — ASU DISCHARGE PLAN (ADULT/PEDIATRIC) - CARE PROVIDER_API CALL
Ac Blue  Cardiovascular Disease  [address] [address]  Phone: [phone]  Fax: [phone]  Established Patient  Follow Up Time: 2 weeks   Ac Blue  Cardiovascular Disease  [address] [address]  Phone: [phone]  Fax: [phone]  Established Patient  Follow Up Time: 2 weeks

## 2023-12-04 NOTE — ASU PATIENT PROFILE, ADULT - FALL HARM RISK - UNIVERSAL INTERVENTIONS
Bed in lowest position, wheels locked, appropriate side rails in place/Call bell, personal items and telephone in reach/Instruct patient to call for assistance before getting out of bed or chair/Non-slip footwear when patient is out of bed/[place] to call system/Physically safe environment - no spills, clutter or unnecessary equipment/Purposeful Proactive Rounding/Room/bathroom lighting operational, light cord in reach Bed in lowest position, wheels locked, appropriate side rails in place/Call bell, personal items and telephone in reach/Instruct patient to call for assistance before getting out of bed or chair/Non-slip footwear when patient is out of bed/[place] to call system/Physically safe environment - no spills, clutter or unnecessary equipment/Purposeful Proactive Rounding/Room/bathroom lighting operational, light cord in reach

## 2023-12-04 NOTE — H&P CARDIOLOGY - NSICDXPASTMEDICALHX_GEN_ALL_CORE_FT
PAST MEDICAL HISTORY:  CAD (Coronary Artery Disease)     Coronary Stent from 1998 - 2020    Diabetes Mellitus with neuropathy    Former smoker     GERD (Gastroesophageal Reflux Disease)     H/O: GI Bleed post colonoscopy in 2010, treated with blood transfusion.    Heart Attack 1998 with stent and 2001 with stent, stents again in approx 2006 and 2/08    Hemorrhoids     History of coronary angiogram in July of 2011 with STENTS to RPDA & RCA    HTN     Hypercholesterolemia     CHRISTIANE (obstructive sleep apnea)

## 2023-12-04 NOTE — ASU DISCHARGE PLAN (ADULT/PEDIATRIC) - NS MD DC FALL RISK RISK
For information on Fall & Injury Prevention, visit: [email or web address] OR  [email or web address] OR  https://www.cdc.gov/steadi/patient.html For information on Fall & Injury Prevention, visit: [email or web address] OR  [email or web address]-injury OR  https://www.cdc.gov/steadi/patient.html

## 2023-12-04 NOTE — H&P CARDIOLOGY - NSICDXFAMILYHX_GEN_ALL_CORE_FT
FAMILY HISTORY:  Family history of diabetes mellitus    Father  Still living? No  FH: heart attack, Age at diagnosis: Age Unknown    Mother  Still living? Unknown  FH: heart attack, Age at diagnosis: Age Unknown    Grandparent  Still living? Unknown  Family history of cardiac disorder, Age at diagnosis: Age Unknown

## 2023-12-18 NOTE — ED ADULT NURSE NOTE - PSH
H/O gastric bypass    Stented coronary artery  x 7 ( Last PCI in 2016)  
minimum assist (75% patients effort)

## 2024-02-05 NOTE — ED PROVIDER NOTE - CCCP TRG CHIEF CMPLNT
Subjective:      Patient ID: Itzel Leger is a 20 y.o. female.    Chief Complaint:  Well Woman      History of Present Illness  HPI  HISTORY OF PRESENT ILLNESS:    Itzel Leger is a 20 y.o. female, , Patient's last menstrual period was 2024 (exact date).,  presents for a routine exam and has no complaints.   She is sexually active. She uses OCP for contraception.  History of STDs: GONORRHEA .  She does want STD screening.  Denies any other GYN complaints.      The patient participates in regular exercise: YES.  The patient does smoke- VAPES.  The patient wears seatbelts.   Pt denies any domestic violence. REPORTS tattoos or blood transfusions    SCREENING:   Pap: N/A (AGE)   Gardasil Status: VACCINATED  Mammogram: N/A  Colonoscopy: N/A   DEXA:  N/A     GYN FH:  Breast cancer: none  Colon cancer: none  Ovarian cancer: none  Endometrial cancer: none    LAST WWE 2023 (ME):  HPI  Pt presents today for an annual exam. She has complaints today of thick, white colored discharge that started 2 weeks ago. She denies itching or odor.   Denies nausea, vomiting, diarrhea, constipation, dysuria, dyspareunia, abdominal pain. She would like STD screening at this visit. No other concerns or complaints at this visit  She also reports feelings of anger and sadness and wants to be evaluated. She denies HI or SI.   The patient participates in regular exercise: no.  The patient does not smoke.  The patient wears seatbelts.   Pt denies any domestic violence. reports tattoos, no blood transfusions     SCREENING:   Pap: N/A  Gardasil Status: vaccinated  Covid Vaccine Status:  vaccinated, needs booster  Mammogram: N/A  Colonoscopy: N/A   DEXA:  N/A     GYN FH:  Breast cancer: none  Colon cancer: none  Ovarian cancer: none  Endometrial cancer: none    GYN & OB History  Patient's last menstrual period was 2024 (exact date).   Date of Last Pap: No result found    OB History    Para Term  AB  Living   0 0 0 0 0 0   SAB IAB Ectopic Multiple Live Births   0 0 0 0 0   Obstetric Comments   Gynhx:  12/21/4-8   Denies std   coitarche @ age 16   S/p gardasil      Past Medical History:  History reviewed. No pertinent past medical history.    Past Surgical History:  History reviewed. No pertinent surgical history.    Family History:  Family History   Problem Relation Age of Onset    Hypertension Father     Hypertension Maternal Grandmother     Heart disease Maternal Grandmother     Diabetes Paternal Grandmother        Allergies:  Review of patient's allergies indicates:   Allergen Reactions    Cat dander Other (See Comments)       Medications:  Current Outpatient Medications on File Prior to Visit   Medication Sig Dispense Refill    dextroamphetamine-amphetamine (ADDERALL XR) 10 MG 24 hr capsule Take 10 mg by mouth once daily.      drospirenone-ethinyl estradioL (GRISELDA) 3-0.02 mg per tablet Take 1 tablet by mouth once daily. 30 tablet 12    ergocalciferol (ERGOCALCIFEROL) 50,000 unit Cap Take 1 capsule by mouth once a week.      fluticasone propionate (FLONASE) 50 mcg/actuation nasal spray 2 sprays by Each Nostril route once daily.      lisdexamfetamine (VYVANSE) 30 MG capsule Take 30 mg by mouth every morning.      multivitamin (THERAGRAN) per tablet Take 1 tablet by mouth once daily.      [DISCONTINUED] ergocalciferol (ERGOCALCIFEROL) 50,000 unit Cap ergocalciferol (vitamin D2) 1,250 mcg (50,000 unit) capsule   TAKE 1 CAPSULE BY MOUTH 1 TIME A WEEK       No current facility-administered medications on file prior to visit.       Social History:  Social History     Tobacco Use    Smoking status: Every Day     Types: Vaping with nicotine     Passive exposure: Yes    Smokeless tobacco: Never   Substance Use Topics    Alcohol use: Yes     Comment: Socially.    Drug use: Yes     Types: Marijuana            Review of Systems  Review of Systems   Constitutional:  Negative for activity change and appetite change.    Respiratory:  Negative for shortness of breath.    Cardiovascular:  Negative for chest pain.   Gastrointestinal:  Negative for abdominal pain, diarrhea and nausea.   Genitourinary:  Negative for bladder incontinence, decreased libido, dysmenorrhea, dyspareunia, dysuria, flank pain, frequency, genital sores, hematuria, hot flashes, menorrhagia, menstrual problem, pelvic pain, urgency, vaginal bleeding, vaginal discharge, vaginal pain, urinary incontinence, postcoital bleeding, postmenopausal bleeding, vaginal dryness and vaginal odor.   Integumentary:  Negative for breast tenderness.   Neurological:  Negative for headaches.   Breast: Negative for breast self exam and tenderness         Objective:     Physical Exam:   Constitutional: She is oriented to person, place, and time. She appears well-developed and well-nourished.    HENT:   Head: Normocephalic.      Cardiovascular:       Exam reveals no clubbing.        Pulmonary/Chest: Effort normal and breath sounds normal. Right breast exhibits no mass, no nipple discharge, no skin change, no tenderness, no bleeding and no swelling. Left breast exhibits no mass, no nipple discharge, no skin change, no tenderness, no bleeding and no swelling. Breasts are symmetrical.        Abdominal: Soft. There is no abdominal tenderness. Hernia confirmed negative in the right inguinal area and confirmed negative in the left inguinal area.     Genitourinary:    Inguinal canal, vagina, uterus, right adnexa, left adnexa and rectum normal.   Rectum:      No tenderness.      Pelvic exam was performed with patient supine.   The external female genitalia was normal.   No external genitalia lesions identified,Genitalia hair distrobution normal .     Labial bartholins normal.Cervix is normal. No vaginal discharge or tenderness in the vagina.    No signs of injury in the vagina.   Vagina was moist.Cervix exhibits no discharge and no tenderness. Uterus is not tender. Normal urethral  meatus.Urethra findings: no tendernessBladder findings: no bladder tenderness          Musculoskeletal: Normal range of motion and moves all extremeties.      Lymphadenopathy: No inguinal adenopathy noted on the right or left side.    Neurological: She is alert and oriented to person, place, and time.    Skin: Skin is warm. Nails show no clubbing.    Psychiatric: She has a normal mood and affect. Her behavior is normal. Judgment and thought content normal.         Assessment:     1. Encounter for gynecological examination without abnormal finding    2. Screening breast examination            Plan:     1. Encounter for gynecological examination without abnormal finding  - C. trachomatis/N. gonorrhoeae by AMP DNA  - Vaginosis Screen by DNA Probe  - Pap not yet indicated.  - Screening tests as ordered.  - Diet and exercise encouraged.  Condom use encouraged for STD prevention.  Seat belt use encouraged.  Calcium and vitamin D recommended.     Counseling: Contraception:condoms and OCP:    Smoking  injury prevention: Driving under the influence of alcohol  Weapons  Seatbelts  Bicycle helmets  Adequate sleep  Exercise  Stress management techniques  indications for and frequency of periodic gynecologic exam    The patient was counseled today on ACS PAP guidelines, with recommendations for yearly pelvic exams unless their uterus, cervix, and ovaries were removed for benign reasons; in that case, examinations every 3-5 years are recommended.  The patient was also counseled regarding monthly breast self-examination, routine STD screening for at-risk populations, prophylactic immunizations for transmitted infections such as  HPV, Pertussis, or Influenza as appropriate, and yearly mammograms when indicated by ACS guidelines.  She was advised to see her primary care physician for all other health maintenance.       2. Screening breast examination  - Self breast exams encouraged     FOLLOW-UP with me annually.     Irene Lepe,  FNP-BC       chest pain

## 2024-02-05 NOTE — DISCHARGE NOTE PROVIDER - NSCORESITESY/N_GEN_A_CORE_RD
This is an TRISTAN John [facility]. Understanding verbalized.    No

## 2024-05-12 NOTE — ED ADULT TRIAGE NOTE - BMI (KG/M2)
History  Chief Complaint   Patient presents with    Dental Pain     Pt reports tooth pain on left lower side beginning one month ago. States woke up with swelling of L cheek two days ago. Pt reports does not have dental insurance and can not afford tooth extraction without it.      40 YOF with PMH DM, dipolar disease and poor dentition presents today due to worsening left lower dental pain and swelling. Pt reports she has multiple cracked teeth on that side and did see her dentist about 2 weeks ago who prescribed her 2 weeks of amoxicillin. States she was referred to  [facility] however they told her that her insurance would not cover the dental extractions and she cannot afford the price they gave her. States that she has tried calling around and calling her insurance company but nobody seems to be of any help. Reports this morning she woke up with significant left cheek swelling, which she has not had before. Pain radiates into her left ear and down her neck. Denies fevers, chills.         Prior to Admission Medications   Prescriptions Last Dose Informant Patient Reported? Taking?   Ascorbic Acid (vitamin C) 1000 MG tablet   No No   Sig: Take 1 tablet (1,000 mg total) by mouth 2 (two) times a day for 7 days   Multiple Vitamin (multivitamin) capsule   No No   Sig: Take 1 capsule by mouth daily for 7 days   acetaminophen (TYLENOL) 325 mg tablet   No No   Sig: Take 3 tablets (975 mg total) by mouth every 6 (six) hours as needed for mild pain or fever   albuterol (2.5 mg/3 mL) 0.083 % nebulizer solution   No No   Sig: Take 3 mL (2.5 mg total) by nebulization every 6 (six) hours as needed for wheezing or shortness of breath   albuterol (ProAir HFA) 90 mcg/act inhaler   No No   Sig: Inhale 2 puffs every 6 (six) hours as needed for wheezing   aluminum-magnesium hydroxide-simethicone (MAALOX) 200-200-20 MG/5ML SUSP   No No   Sig: Take 15 mL by mouth 4 (four) times a day (before meals and at bedtime)   buPROPion (WELLBUTRIN SR)  200 MG 12 hr tablet   Yes No   Sig: Take 200 mg by mouth daily   cholecalciferol (VITAMIN D3) 1,000 units tablet   No No   Sig: Take 2 tablets (2,000 Units total) by mouth daily for 7 days   diclofenac sodium (VOLTAREN) 1 %   No No   Sig: Apply 2 g topically 4 (four) times a day   escitalopram (LEXAPRO) 20 mg tablet   Yes No   Sig: Take 5 mg by mouth daily   gabapentin (NEURONTIN) 600 MG tablet   Yes No   Sig: Take 600 mg by mouth 3 (three) times a day   guaiFENesin (ROBITUSSIN) 100 MG/5ML oral liquid   No No   Sig: Take 5-10 mL (100-200 mg total) by mouth every 4 (four) hours as needed for cough   hydrOXYzine HCL (ATARAX) 25 mg tablet   No No   Sig: Take 1 tablet (25 mg total) by mouth every 6 (six) hours   ibuprofen (MOTRIN) 400 mg tablet   No No   Sig: Take 1 tablet (400 mg total) by mouth every 6 (six) hours as needed for mild pain, moderate pain or fever   ibuprofen (MOTRIN) 600 mg tablet   No No   Sig: Take 1 tablet (600 mg total) by mouth every 6 (six) hours as needed for mild pain   ipratropium (ATROVENT) 0.02 % nebulizer solution   No No   Sig: Take 1 vial (0.5 mg total) by nebulization 4 (four) times a day   lidocaine (Lidoderm) 5 %   No No   Sig: Apply 1 patch topically daily Remove & Discard patch within 12 hours or as directed by MD   loratadine (CLARITIN) 10 mg tablet   No No   Sig: Take 1 tablet (10 mg total) by mouth daily   metFORMIN (GLUCOPHAGE) 850 mg tablet   Yes No   Sig: Take 850 mg by mouth daily with breakfast   methocarbamol (ROBAXIN) 500 mg tablet   No No   Sig: Take 1 tablet (500 mg total) by mouth 2 (two) times a day   metoclopramide (Reglan) 10 mg tablet   No No   Sig: Take 1 tablet (10 mg total) by mouth every 6 (six) hours PRN nausea, HA   naproxen (Naprosyn) 500 mg tablet   No No   Sig: Take 1 tablet (500 mg total) by mouth 2 (two) times a day with meals PRN HA   nystatin (MYCOSTATIN) powder   No No   Sig: Apply topically 4 (four) times a day   ondansetron (ZOFRAN) 4 mg tablet   No No    Sig: Take 1 tablet (4 mg total) by mouth every 6 (six) hours   pantoprazole (PROTONIX) 20 mg tablet   No No   Sig: Take 1 tablet (20 mg total) by mouth daily   traMADol (ULTRAM) 50 mg tablet   Yes No   Sig: Take 50 mg by mouth every 6 (six) hours as needed for moderate pain      Facility-Administered Medications: None       Past Medical History:   Diagnosis Date    Anxiety     Arthritis     Bipolar disease, chronic (HCC)     Diabetes mellitus (HCC)        Past Surgical History:   Procedure Laterality Date    NO PAST SURGERIES         History reviewed. No pertinent family history.  I have reviewed and agree with the history as documented.    E-Cigarette/Vaping    E-Cigarette Use Never User      E-Cigarette/Vaping Substances     Social History     Tobacco Use    Smoking status: Every Day     Current packs/day: 0.50     Types: Cigarettes    Smokeless tobacco: Never   Vaping Use    Vaping status: Never Used   Substance Use Topics    Alcohol use: Never    Drug use: Yes     Types: Marijuana     Comment: rare       Review of Systems   Constitutional:  Negative for chills and fever.   HENT:  Positive for dental problem.    Gastrointestinal:  Negative for nausea.   All other systems reviewed and are negative.      Physical Exam  Physical Exam  Vitals and nursing note reviewed.   Constitutional:       General: She is not in acute distress.     Appearance: Normal appearance. She is well-developed. She is not ill-appearing.   HENT:      Head: Normocephalic and atraumatic.        Mouth/Throat:      Dentition: Abnormal dentition. Dental tenderness and gingival swelling present.     Eyes:      Conjunctiva/sclera: Conjunctivae normal.   Cardiovascular:      Rate and Rhythm: Normal rate and regular rhythm.      Heart sounds: No murmur heard.  Pulmonary:      Effort: Pulmonary effort is normal. No respiratory distress.      Breath sounds: Normal breath sounds.   Abdominal:      Palpations: Abdomen is soft.      Tenderness: There is  no abdominal tenderness.   Musculoskeletal:         General: No swelling. Normal range of motion.      Cervical back: Normal range of motion and neck supple.   Skin:     General: Skin is warm and dry.      Capillary Refill: Capillary refill takes less than 2 seconds.   Neurological:      Mental Status: She is alert.   Psychiatric:         Mood and Affect: Mood normal.         Vital Signs  ED Triage Vitals   Temperature Pulse Respirations Blood Pressure SpO2   05/12/24 1004 05/12/24 1004 05/12/24 1004 05/12/24 1004 05/12/24 1004   97.9 °F (36.6 °C) 95 16 127/78 99 %      Temp Source Heart Rate Source Patient Position - Orthostatic VS BP Location FiO2 (%)   05/12/24 1004 -- -- -- --   Oral          Pain Score       05/12/24 1106       10 - Worst Possible Pain           Vitals:    05/12/24 1004   BP: 127/78   Pulse: 95         Visual Acuity  Visual Acuity      Flowsheet Row Most Recent Value   L Pupil Size (mm) 4   R Pupil Size (mm) 4            ED Medications  Medications   lactated ringers bolus 1,000 mL (0 mL Intravenous Stopped 5/12/24 1210)   ketorolac (TORADOL) injection 15 mg (15 mg Intravenous Given 5/12/24 1108)   dexamethasone oral liquid 10 mg 1 mL (10 mg Oral Given 5/12/24 1106)   acetaminophen (TYLENOL) tablet 975 mg (975 mg Oral Given 5/12/24 1106)   ceFAZolin (ANCEF) IVPB (premix in dextrose) 2,000 mg 50 mL (0 mg Intravenous Stopped 5/12/24 1141)   iohexol (OMNIPAQUE) 350 MG/ML injection (MULTI-DOSE) 70 mL (70 mL Intravenous Given 5/12/24 1204)       Diagnostic Studies  Results Reviewed       Procedure Component Value Units Date/Time    POCT pregnancy, urine [[id number]]  (Normal) Resulted: 05/12/24 1142    Lab Status: Final result Updated: 05/12/24 1142     EXT Preg Test, Ur Negative     Control Valid    Comprehensive metabolic panel [[id number]] Collected: 05/12/24 1112    Lab Status: Final result Specimen: Blood from Arm, Left Updated: 05/12/24 1138     Sodium 138 mmol/L      Potassium 4.2 mmol/L       Chloride 108 mmol/L      CO2 24 mmol/L      ANION GAP 6 mmol/L      BUN 12 mg/dL      Creatinine 0.67 mg/dL      Glucose 101 mg/dL      Calcium 9.1 mg/dL      AST 14 U/L      ALT 13 U/L      Alkaline Phosphatase 93 U/L      Total Protein 7.4 g/dL      Albumin 4.2 g/dL      Total Bilirubin 0.28 mg/dL      eGFR 111 ml/min/1.73sq m     Narrative:      [employer] Kid[employer] guidelines for Chronic Kidney Disease (CKD):     Stage 1 with normal or high GFR (GFR > 90 mL/min/1.73 square meters)    Stage 2 Mild CKD (GFR = 60-89 mL/min/1.73 square meters)    Stage 3A Moderate CKD (GFR = 45-59 mL/min/1.73 square meters)    Stage 3B Moderate CKD (GFR = 30-44 mL/min/1.73 square meters)    Stage 4 Severe CKD (GFR = 15-29 mL/min/1.73 square meters)    Stage 5 End Stage CKD (GFR <15 mL/min/1.73 square meters)  Note: GFR calculation is accurate only with a steady state creatinine    CBC and differential [[id number]]  (Abnormal) Collected: 05/12/24 1112    Lab Status: Final result Specimen: Blood from Arm, Left Updated: 05/12/24 1118     WBC 12.39 Thousand/uL      RBC 4.81 Million/uL      Hemoglobin 12.8 g/dL      Hematocrit 41.1 %      MCV 85 fL      MCH 26.6 pg      MCHC 31.1 g/dL      RDW 16.6 %      MPV 10.3 fL      Platelets 288 Thousands/uL      nRBC 0 /100 WBCs      Segmented % 62 %      Immature Grans % 1 %      Lymphocytes % 28 %      Monocytes % 5 %      Eosinophils Relative 3 %      Basophils Relative 1 %      Absolute Neutrophils 7.82 Thousands/µL      Absolute Immature Grans 0.06 Thousand/uL      Absolute Lymphocytes 3.44 Thousands/µL      Absolute Monocytes 0.65 Thousand/µL      Eosinophils Absolute 0.34 Thousand/µL      Basophils Absolute 0.08 Thousands/µL                    CT soft tissue neck with contrast    (Results Pending)              Procedures  Procedures         ED Course  ED Course as of 05/12/24 1250   Sun May 12, 2024   1230 Pt yelling- requesting that her IV is removed and she is discharged. Pt  "states \"you are not doing aynything to help me\". I explained to her all of the blood work and the CT scans. Explained we were waiting on the CT scan results. Pt does not wish to wait any longer.     The patient insists on leaving Against Medical Advice, despite my recommendation to remain for ongoing treatment.    1: Capacity: I have determined that the patient has capacity to make the decision to leave against medical advice based on the following:    A. Ability to express a choice: The patient is able to express his or her choice and communicate that choice.   B. Ability to understand relevant information: The patient is able to verbalize their diagnosis, understand information about the purpose of treatment, remember the information, and show that he or she can be part of the decision-making process.    C. Ability to appreciate the significance of the information and its consequences: The patient understands the consequences of treatment refusal and the risks and benefits of accepting or refusing treatment.    D. Ability to manipulate information: The patient is able to engage in reasoning as it applies to making treatment decisions   2: Psychiatric Consultation: There is not an indication to call psychiatry consultation to determine capacity    3. Alternative Treatment: I have discussed the recommended course of treatment and available alternatives.  4. Risks: I have discussed the specific risks of that patient refusing treatment   5. Follow-up Care: I have discussed the follow-up care and advised to see patient's PCP immediately or return here for worsening.   6. ED Option: I have emphasized that the patient has the option to return to the ED. Reviewed that we can have continuation of the workup at any time and that we are always open.                                  SBIRT 20yo+      Flowsheet Row Most Recent Value   Initial Alcohol Screen: US AUDIT-C     1. How often do you have a drink containing alcohol? 0 " Filed at: 05/12/2024 1209   2. How many drinks containing alcohol do you have on a typical day you are drinking?  0 Filed at: 05/12/2024 1209   3b. FEMALE Any Age, or MALE 65+: How often do you have 4 or more drinks on one occassion? 0 Filed at: 05/12/2024 1209   Audit-C Score 0 Filed at: 05/12/2024 1209   JONNIE: How many times in the past year have you...    Used an illegal drug or used a prescription medication for non-medical reasons? Never Filed at: 05/12/2024 1209                      Medical Decision Making  Pt wished to leave AMA prior to CT scan results. We discussed risk of this. Will send her home with Augmentin to cover for possible dental abscess however discussed that there is a chance that based on CT results she may need surgery and she understands this.     Discharge paperwork was given to pt. Pt also requesting a work note for today.       Amount and/or Complexity of Data Reviewed  Labs: ordered.  Radiology: ordered.    Risk  OTC drugs.  Prescription drug management.             Disposition  Final diagnoses:   Dentalgia     Time reflects when diagnosis was documented in both MDM as applicable and the Disposition within this note       Time User Action Codes Description Comment    5/12/2024 12:31 PM Megan Briscoe Add [K08.89] Dentalgia           ED Disposition       ED Disposition   AMA    Condition   --    Date/Time   Sun May 12, 2024 1234    Comment   Date: 5/12/2024  Patient: Ruben Garner  Admitted: 5/12/2024 10:25 AM  Attending Provider: Rocio Holguin,     Ruben Garner or her authorized caregiver has made the decision for the patient to leave the emergency department  against the advice of the emergency department staff. She or her authorized caregiver has been informed and understands the inherent risks, including death, worsening infection, worsening pain.  She or her authorized caregiver has decided to accept  the responsibility for this decision. Ruben  Pascual and all necessary parties have been advised that she may return for further evaluation or treatment. Her condition at time of discharge was stable.  Ruben Garner had current vital  signs as follows:  /78   Pulse 95   Temp 97.9 °F (36.6 °C) (Oral)   Resp 16   Wt 116 kg (256 lb 2.8 oz)                Follow-up Information    None         Discharge Medication List as of 5/12/2024 12:34 PM        START taking these medications    Details   amoxicillin-clavulanate (AUGMENTIN) 875-125 mg per tablet Take 1 tablet by mouth every 12 (twelve) hours for 7 days, Starting Sun 5/12/2024, Until Sun 5/19/2024, Normal           CONTINUE these medications which have NOT CHANGED    Details   acetaminophen (TYLENOL) 325 mg tablet Take 3 tablets (975 mg total) by mouth every 6 (six) hours as needed for mild pain or fever, Starting Fri 2/28/2020, Normal      albuterol (2.5 mg/3 mL) 0.083 % nebulizer solution Take 3 mL (2.5 mg total) by nebulization every 6 (six) hours as needed for wheezing or shortness of breath, Starting Tue 10/19/2021, Print      albuterol (ProAir HFA) 90 mcg/act inhaler Inhale 2 puffs every 6 (six) hours as needed for wheezing, Starting Tue 10/19/2021, Print      aluminum-magnesium hydroxide-simethicone (MAALOX) 200-200-20 MG/5ML SUSP Take 15 mL by mouth 4 (four) times a day (before meals and at bedtime), Starting Sun 5/19/2019, Print      Ascorbic Acid (vitamin C) 1000 MG tablet Take 1 tablet (1,000 mg total) by mouth 2 (two) times a day for 7 days, Starting Sun 12/20/2020, Until Fri 9/2/2022, Normal      buPROPion (WELLBUTRIN SR) 200 MG 12 hr tablet Take 200 mg by mouth daily, Historical Med      cholecalciferol (VITAMIN D3) 1,000 units tablet Take 2 tablets (2,000 Units total) by mouth daily for 7 days, Starting Sun 12/20/2020, Until Fri 9/2/2022, Normal      diclofenac sodium (VOLTAREN) 1 % Apply 2 g topically 4 (four) times a day, Starting Mon 1/20/2020, Normal      escitalopram  (LEXAPRO) 20 mg tablet Take 5 mg by mouth daily, Historical Med      gabapentin (NEURONTIN) 600 MG tablet Take 600 mg by mouth 3 (three) times a day, Historical Med      guaiFENesin (ROBITUSSIN) 100 MG/5ML oral liquid Take 5-10 mL (100-200 mg total) by mouth every 4 (four) hours as needed for cough, Starting Sun 12/20/2020, Normal      hydrOXYzine HCL (ATARAX) 25 mg tablet Take 1 tablet (25 mg total) by mouth every 6 (six) hours, Starting u 3/7/2024, Normal      !! ibuprofen (MOTRIN) 400 mg tablet Take 1 tablet (400 mg total) by mouth every 6 (six) hours as needed for mild pain, moderate pain or fever, Starting Sat 10/13/2018, Normal      !! ibuprofen (MOTRIN) 600 mg tablet Take 1 tablet (600 mg total) by mouth every 6 (six) hours as needed for mild pain, Starting Fri 2/28/2020, Normal      ipratropium (ATROVENT) 0.02 % nebulizer solution Take 1 vial (0.5 mg total) by nebulization 4 (four) times a day, Starting Mon 9/7/2020, Normal      lidocaine (Lidoderm) 5 % Apply 1 patch topically daily Remove & Discard patch within 12 hours or as directed by MD, Starting Fri 9/2/2022, Normal      loratadine (CLARITIN) 10 mg tablet Take 1 tablet (10 mg total) by mouth daily, Starting Mon 9/7/2020, Normal      metFORMIN (GLUCOPHAGE) 850 mg tablet Take 850 mg by mouth daily with breakfast, Historical Med      methocarbamol (ROBAXIN) 500 mg tablet Take 1 tablet (500 mg total) by mouth 2 (two) times a day, Starting Fri 9/2/2022, Normal      metoclopramide (Reglan) 10 mg tablet Take 1 tablet (10 mg total) by mouth every 6 (six) hours PRN nausea, HA, Starting Wed 8/30/2023, Normal      Multiple Vitamin (multivitamin) capsule Take 1 capsule by mouth daily for 7 days, Starting Sun 12/20/2020, Until Sun 12/27/2020, Normal      naproxen (Naprosyn) 500 mg tablet Take 1 tablet (500 mg total) by mouth 2 (two) times a day with meals PRN CASAREZ, Starting Wed 8/30/2023, Normal      nystatin (MYCOSTATIN) powder Apply topically 4 (four) times a  day, Starting Mon 3/27/2023, Normal      ondansetron (ZOFRAN) 4 mg tablet Take 1 tablet (4 mg total) by mouth every 6 (six) hours, Starting Fri 2/28/2020, Normal      pantoprazole (PROTONIX) 20 mg tablet Take 1 tablet (20 mg total) by mouth daily, Starting Mon 9/30/2019, Print      traMADol (ULTRAM) 50 mg tablet Take 50 mg by mouth every 6 (six) hours as needed for moderate pain, Historical Med       !! - Potential duplicate medications found. Please discuss with provider.              PDMP Review       None            ED Provider  Electronically Signed by             Megan Briscoe PA-C  05/12/24 [id number]     37.2

## 2024-06-28 ENCOUNTER — APPOINTMENT (OUTPATIENT)
Dept: VASCULAR SURGERY | Facility: CLINIC | Age: 69
End: 2024-06-28
Payer: MEDICARE

## 2024-06-28 VITALS
HEART RATE: 56 BPM | SYSTOLIC BLOOD PRESSURE: 125 MMHG | DIASTOLIC BLOOD PRESSURE: 81 MMHG | BODY MASS INDEX: 27.16 KG/M2 | WEIGHT: 194 LBS | HEIGHT: 71 IN

## 2024-06-28 VITALS — HEART RATE: 60 BPM | SYSTOLIC BLOOD PRESSURE: 119 MMHG | DIASTOLIC BLOOD PRESSURE: 77 MMHG

## 2024-06-28 DIAGNOSIS — Z86.39 PERSONAL HISTORY OF OTHER ENDOCRINE, NUTRITIONAL AND METABOLIC DISEASE: ICD-10-CM

## 2024-06-28 DIAGNOSIS — I25.2 OLD MYOCARDIAL INFARCTION: ICD-10-CM

## 2024-06-28 DIAGNOSIS — Z78.9 OTHER SPECIFIED HEALTH STATUS: ICD-10-CM

## 2024-06-28 DIAGNOSIS — Z86.79 PERSONAL HISTORY OF OTHER DISEASES OF THE CIRCULATORY SYSTEM: ICD-10-CM

## 2024-06-28 DIAGNOSIS — Z80.3 FAMILY HISTORY OF MALIGNANT NEOPLASM OF BREAST: ICD-10-CM

## 2024-06-28 DIAGNOSIS — Z87.891 PERSONAL HISTORY OF NICOTINE DEPENDENCE: ICD-10-CM

## 2024-06-28 PROCEDURE — 99203 OFFICE O/P NEW LOW 30 MIN: CPT

## 2024-07-09 NOTE — ED ADULT NURSE NOTE - VOIDING
PCP - Maxi Gaines MD  Subjective:   Patient ID:  Dominick KENNEDY MD Duncan is a 78 y.o. male who presents for      HPI:   78 M w HTN, moderate/severe MR, BRENTON (uses CPAP), interstitial lung disease, and paroxysmal AFib (s/p ablation and PPM) with chronic anemia requiring anticoagulation for which ALMA occlusion device was recommended.   He has chronic anemia with a positive stool occult blood. He is chronically on eliquis for his PAF with a CHADSVASC 6 and HASBLED 4. While undergoing some workup for occluder device his repeat echo showed a normal LVEF, mod-severe MR with a posterior eccentric jet, dilated IVC. After doing some research he was interested obtaining more information about mitral clip and LAAO occluder devices.  On 5/23/24 he underwent successful ALMA occlusion with 31 mm Amulet. Eliquis was discontinued and he is currently on dapt asa and plavix.      Today, here for 6-week PEPE. No peridevice leak, iatrogenic ASD noted. No acute complaints. No issues or pain at R groin access site.   History:     Past Medical History:   Diagnosis Date    Anticoagulant long-term use     Anxiety     Arthritis     Atrial fibrillation     BPH (benign prostatic hyperplasia)     Cataract     CKD (chronic kidney disease) stage 3, GFR 30-59 ml/min 07/10/2017    Followed by Dr. Jeevan Pond    Colon polyp     benign    Depression     Elevated PSA     Erectile dysfunction     Gastric ulcer with hemorrhage     Hep B w/o coma 1977    History of bleeding peptic ulcer     History of prostatitis     Hypertension     PAF (paroxysmal atrial fibrillation)     Sleep apnea     PT DENIES    Stroke     TIA December 2019    Thyroid disease      Past Surgical History:   Procedure Laterality Date    A-V CARDIAC PACEMAKER INSERTION Left 9/23/2023    Procedure: Dual Chamber PPM (Heart) Rm 2620;  Surgeon: Pan Moss MD;  Location: [facility] CATH;  Service: Cardiology;  Laterality: Left;    ABDOMINAL HERNIA REPAIR      ABLATION OF  ARRHYTHMOGENIC FOCUS FOR ATRIAL FIBRILLATION N/A 6/15/2020    Procedure: Ablation atrial fibrillation;  Surgeon: Wyatt Beatty MD;  Location: [facility] EP LAB;  Service: Cardiology;  Laterality: N/A;  PAF, AFl, PEPE, PVI re-do, CTI, RFA, JUSTIN, Gen, SK, 3 Prep    APPLICATION OF WOUND VACUUM-ASSISTED CLOSURE DEVICE Right 12/9/2023    Procedure: APPLICATION, WOUND VAC;  Surgeon: Jelani Tello II, MD;  Location: [facility] OR;  Service: Orthopedics;  Laterality: Right;    CARDIOVERSION N/A 2/28/2024    Procedure: Cardioversion;  Surgeon: Pao Hare MD;  Location: [facility];  Service: Cardiology;  Laterality: N/A;    CATARACT EXTRACTION  11/25/2013    bilateral    CHOLECYSTECTOMY      CLOSURE OF LEFT ATRIAL APPENDAGE USING DEVICE N/A 5/23/2024    Procedure: Left atrial appendage closure device;  Surgeon: Tony Duvall MD;  Location: [facility] CATH LAB;  Service: Cardiology;  Laterality: N/A;    COLONOSCOPY N/A 11/25/2015    Procedure: COLONOSCOPY;  Surgeon: Toby Hernandez MD;  Location: [facility] ENDO;  Service: Endoscopy;  Laterality: N/A;    COLONOSCOPY N/A 11/13/2020    Procedure: COLONOSCOPY;  Surgeon: Toby Hernandez MD;  Location: [facility] ENDO;  Service: Endoscopy;  Laterality: N/A;    COLONOSCOPY N/A 5/1/2024    Procedure: COLONOSCOPY;  Surgeon: Toby Hernandez MD;  Location: [facility] ENDO;  Service: Endoscopy;  Laterality: N/A;    CYSTOSCOPY WITH INSERTION OF MINIMALLY INVASIVE IMPLANT TO ENLARGE PROSTATIC URETHRA N/A 11/28/2022    Procedure: CYSTOSCOPY, WITH INSERTION OF UROLIFT IMPLANT;  Surgeon: Yakov Shetty MD;  Location: [facility] OR;  Service: Urology;  Laterality: N/A;    ESOPHAGOGASTRODUODENOSCOPY N/A 5/1/2024    Procedure: ESOPHAGOGASTRODUODENOSCOPY (EGD);  Surgeon: Toby Hernandez MD;  Location: [facility] ENDO;  Service: Endoscopy;  Laterality: N/A;    EYE SURGERY      GASTRIC BYPASS  1992    INSERTION, PACEMAKER, TEMPORARY TRANSVENOUS  9/22/2023    Procedure: Temp pacer insertion ER Rm 8;  Surgeon: Isa  Pan CANDELARIA MD;  Location: [facility] CATH;  Service: Cardiology;;    INTRAMEDULLARY RODDING OF FEMUR Left 7/18/2020    Procedure: INSERTION, INTRAMEDULLARY ERCI, FEMUR, left, Synthes, [place] table, Large C arm clock side,;  Surgeon: Tony Rodriguez MD;  Location: [facility] OR 2ND FLR;  Service: Orthopedics;  Laterality: Left;    IRRIGATION AND DEBRIDEMENT Right 12/9/2023    Procedure: IRRIGATION AND DEBRIDEMENT KNEE;  Surgeon: Jelani Tello II, MD;  Location: [facility] OR;  Service: Orthopedics;  Laterality: Right;    KNEE ARTHROSCOPY      RT    LASIK  2001    both eyes (Dr. Rabago)    ORIF HUMERUS FRACTURE      LT    ORIF HUMERUS FRACTURE Right     non surgical repair    RADIOFREQUENCY ABLATION      x2    REVISION OF KNEE ARTHROPLASTY Right 12/9/2023    Procedure: REVISION ARTHROPLASTY KNEE- Liner Replacement - dirty/clean;  Surgeon: Jelani Tello II, MD;  Location: [facility] OR;  Service: Orthopedics;  Laterality: Right;  dirty to clean at 1940    Right ankle tendon repair      ROBOT-ASSISTED REPAIR OF INCISIONAL HERNIA USING DA GARETH XI Left 6/13/2022    Procedure: XI ROBOTIC REPAIR, HERNIA, INCISIONAL (LEFT SIDE SPIGELIAN WITH MESH);  Surgeon: Rosendo Marti MD;  Location: [facility] OR 2ND FLR;  Service: General;  Laterality: Left;  consent in am    ROTATOR CUFF REPAIR      right    TOTAL KNEE ARTHROPLASTY Right 5/29/2018    Procedure: REPLACEMENT-KNEE-TOTAL-pro;  Surgeon: Denzel Dallas MD;  Location: [facility] OR 2ND FLR;  Service: Orthopedics;  Laterality: Right;  Pro    TRANSESOPHAGEAL ECHOCARDIOGRAPHY N/A 4/23/2024    Procedure: ECHOCARDIOGRAM, TRANSESOPHAGEAL;  Surgeon: Jan Bravo Diagnostic;  Location: [facility] EP LAB;  Service: Cardiology;  Laterality: N/A;    TRANSESOPHAGEAL ECHOCARDIOGRAPHY N/A 5/23/2024    Procedure: ECHOCARDIOGRAM, TRANSESOPHAGEAL;  Surgeon: Kj Newton MD;  Location: [facility] CATH LAB;  Service: Cardiology;  Laterality: N/A;    TREATMENT OF CARDIAC ARRHYTHMIA  6/15/2020     Procedure: Cardioversion or Defibrillation;  Surgeon: Wyatt Beatty MD;  Location: [facility] EP LAB;  Service: Cardiology;;    TREATMENT OF CARDIAC ARRHYTHMIA N/A 2/28/2024    Procedure: Cardioversion or Defibrillation;  Surgeon: Pao Hare MD;  Location: [facility];  Service: Cardiology;  Laterality: N/A;     Social History     Tobacco Use    Smoking status: Never     Passive exposure: Never    Smokeless tobacco: Never    Tobacco comments:     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    Substance Use Topics    Alcohol use: No     Family History   Problem Relation Name Age of Onset    COPD Father      Diabetes Father      Osteoporosis Father      Aortic stenosis Mother      Heart disease Mother          aortic stenosis    Osteoporosis Mother      Heart attack Brother      No Known Problems Son      No Known Problems Daughter      No Known Problems Daughter      No Known Problems Daughter         Meds:     Review of patient's allergies indicates:   Allergen Reactions    No known drug allergies        Current Outpatient Medications:     aspirin (ECOTRIN) 81 MG EC tablet, Take 1 tablet (81 mg total) by mouth once daily., Disp: 90 tablet, Rfl: 3    buPROPion (WELLBUTRIN XL) 150 MG TB24 tablet, Take 1 tablet (150 mg total) by mouth once daily., Disp: 90 tablet, Rfl: 3    buPROPion (WELLBUTRIN XL) 300 MG 24 hr tablet, Take 1 tablet (300 mg total) by mouth once daily., Disp: 90 tablet, Rfl: 3    carvediloL (COREG) 6.25 MG tablet, Take 12.5 mg by mouth 2 (two) times daily., Disp: , Rfl:     clopidogreL (PLAVIX) 75 mg tablet, First dose, please take 4 tablets ( 300 mg ) by mouth on day 1 and then one tablet (75 mg) by mouth daily after that, Disp: 90 tablet, Rfl: 3    cyclobenzaprine (FLEXERIL) 10 MG tablet, Take 1 tablet (10 mg total) by mouth 3 (three) times daily as needed for Muscle spasms., Disp: 60 tablet, Rfl: 3    droNABinol (MARINOL) 2.5 MG capsule, Take 1 capsule (2.5 mg total) by mouth 2 (two) times daily before meals., Disp:  "60 capsule, Rfl: 2    EScitalopram oxalate (LEXAPRO) 10 MG tablet, TAKE 1 TABLET(10 MG) BY MOUTH EVERY DAY, Disp: 90 tablet, Rfl: 4    iron-vit c-b12-folic acid (ICAR-C PLUS) Tab, Take 1 tablet by mouth once daily., Disp: 90 tablet, Rfl: 3    levothyroxine (SYNTHROID) 75 MCG tablet, Take 1 tablet (75 mcg total) by mouth before breakfast., Disp: 90 tablet, Rfl: 1    OMEGA-3 FATTY ACIDS (FISH OIL CONCENTRATE ORAL), Take 1 capsule by mouth Daily., Disp: , Rfl:     potassium chloride (MICRO-K) 10 MEQ CpSR, Take 1 capsule (10 mEq total) by mouth every Mon, Wed, Fri., Disp: 15 capsule, Rfl: 5    propafenone (RYTHMOL SR) 425 MG Cp12, Take 1 capsule (425 mg total) by mouth every 12 (twelve) hours., Disp: 60 capsule, Rfl: 11    telmisartan (MICARDIS) 40 MG Tab, Take 1 tablet (40 mg total) by mouth once daily., Disp: 90 tablet, Rfl: 3    tiZANidine (ZANAFLEX) 4 MG tablet, Take 1 tablet (4 mg total) by mouth 3 (three) times daily as needed., Disp: 30 tablet, Rfl: 3    torsemide (DEMADEX) 20 MG Tab, Take 1 tablet (20 mg total) by mouth every Mon, Wed, Fri., Disp: 15 tablet, Rfl: 5    alfuzosin (UROXATRAL) 10 mg Tb24, Take 1 tablet (10 mg total) by mouth daily with breakfast. (Patient not taking: Reported on 7/9/2024), Disp: 30 tablet, Rfl: 11  No current facility-administered medications for this visit.      ROS    Objective:   BP (!) 186/88 (BP Location: Right arm, Patient Position: Sitting, BP Method: Large (Automatic))   Pulse (!) 51   Ht 6' (1.829 m)   Wt 81 kg (178 lb 9.2 oz)   SpO2 99%   BMI 24.22 kg/m²   Physical Exam    Labs:     Lab Results   Component Value Date     06/19/2024    K 4.7 06/19/2024     (H) 06/19/2024    CO2 19 (L) 06/19/2024    BUN 39 (H) 06/19/2024    CREATININE 1.6 (H) 06/19/2024    ANIONGAP 8 06/19/2024     Lab Results   Component Value Date    HGBA1C 4.3 06/19/2024     No results found for: "BNP", "BNPTRIAGEBLO"    Lab Results   Component Value Date    WBC 8.85 06/19/2024    HGB " "11.8 (L) 06/19/2024    HCT 37.1 (L) 06/19/2024    HCT 33.0 (L) 12/09/2023     06/19/2024    GRAN 5.5 06/19/2024    GRAN 61.7 06/19/2024     Lab Results   Component Value Date    CHOL 115 (L) 08/30/2023    HDL 49 08/30/2023    LDLCALC 56.8 (L) 08/30/2023    TRIG 46 08/30/2023       Lab Results   Component Value Date     06/19/2024    K 4.7 06/19/2024     (H) 06/19/2024    CO2 19 (L) 06/19/2024    BUN 39 (H) 06/19/2024    CREATININE 1.6 (H) 06/19/2024    ANIONGAP 8 06/19/2024     Lab Results   Component Value Date    HGBA1C 4.3 06/19/2024     No results found for: "BNP", "BNPTRIAGEBLO" Lab Results   Component Value Date    WBC 8.85 06/19/2024    HGB 11.8 (L) 06/19/2024    HCT 37.1 (L) 06/19/2024    HCT 33.0 (L) 12/09/2023     06/19/2024    GRAN 5.5 06/19/2024    GRAN 61.7 06/19/2024     Lab Results   Component Value Date    CHOL 115 (L) 08/30/2023    HDL 49 08/30/2023    LDLCALC 56.8 (L) 08/30/2023    TRIG 46 08/30/2023                Cardiovascular Imaging:       Echo:   EF   Date Value Ref Range Status   09/21/2023 65 % Final     Nuc Stress EF   Date Value Ref Range Status   09/20/2023 60 % Final       Stress test:     LHC:    PEPE 7/9/24:     TTE done as a follow up PEPE six weeks after LAAO.  The device is well seated with no peridevice leak.    Left Ventricle: The left ventricle is normal in size. There is normal systolic function with a visually estimated ejection fraction of 60 - 65%.    Right Ventricle: Normal right ventricular cavity size. Systolic function is normal.    An iatrogenic atrial septal defect (ASD) is present indicated by color flow Doppler.  No patent foramen ovale confirmed by Doppler. There is a closure device within the appendage. The device is a 31mm Amplatzer Amulet. The pulmonary veins have systolic blunting.    Biatrial enlargement    Aortic Valve: The aortic valve is a trileaflet valve. There is normal leaflet mobility. There is mild aortic regurgitation.    " Mitral Valve: There is normal leaflet mobility. There is moderate regurgitation with an eccentric jet and a wall hugging jet.    History of gastric bypass, transgastric views were not performed.    Assessment & Plan:   No problem-specific Assessment & Plan notes found for this encounter.    PAF (paroxysmal atrial fibrillation)  - s/p Amulet on 5/23/24  - 6 week follow up PEPE without marcela-device leak, well seated   - discontinue plavix today, continue daily baby aspirin   - follow up in clinic in 6 months      Ashley Remy MD  Cardiovascular Disease, PGY IV  [facility]    I have seen the patient, reviewed the Fellow's history and physical, assessment and plan. I have personally interviewed and examined the patient and agree with the findings.     FADUMO Duvall MD     without difficulty

## 2024-09-16 ENCOUNTER — INPATIENT (INPATIENT)
Facility: HOSPITAL | Age: 69
LOS: 1 days | Discharge: HOME | DRG: 322 | End: 2024-09-18
Attending: STUDENT IN AN ORGANIZED HEALTH CARE EDUCATION/TRAINING PROGRAM | Admitting: STUDENT IN AN ORGANIZED HEALTH CARE EDUCATION/TRAINING PROGRAM
Payer: MEDICARE

## 2024-09-16 VITALS
WEIGHT: 195.11 LBS | HEIGHT: 71 IN | SYSTOLIC BLOOD PRESSURE: 123 MMHG | DIASTOLIC BLOOD PRESSURE: 73 MMHG | TEMPERATURE: 98 F | OXYGEN SATURATION: 98 % | HEART RATE: 59 BPM | RESPIRATION RATE: 18 BRPM

## 2024-09-16 DIAGNOSIS — Z95.5 PRESENCE OF CORONARY ANGIOPLASTY IMPLANT AND GRAFT: Chronic | ICD-10-CM

## 2024-09-16 DIAGNOSIS — I25.10 ATHEROSCLEROTIC HEART DISEASE OF NATIVE CORONARY ARTERY WITHOUT ANGINA PECTORIS: ICD-10-CM

## 2024-09-16 DIAGNOSIS — I10 ESSENTIAL (PRIMARY) HYPERTENSION: ICD-10-CM

## 2024-09-16 DIAGNOSIS — I20.0 UNSTABLE ANGINA: ICD-10-CM

## 2024-09-16 DIAGNOSIS — E11.9 TYPE 2 DIABETES MELLITUS WITHOUT COMPLICATIONS: ICD-10-CM

## 2024-09-16 DIAGNOSIS — Z29.9 ENCOUNTER FOR PROPHYLACTIC MEASURES, UNSPECIFIED: ICD-10-CM

## 2024-09-16 DIAGNOSIS — Z98.84 BARIATRIC SURGERY STATUS: Chronic | ICD-10-CM

## 2024-09-16 DIAGNOSIS — I24.9 ACUTE ISCHEMIC HEART DISEASE, UNSPECIFIED: ICD-10-CM

## 2024-09-16 LAB
ALBUMIN SERPL ELPH-MCNC: 4.3 G/DL — SIGNIFICANT CHANGE UP (ref 3.3–5)
ALP SERPL-CCNC: 63 U/L — SIGNIFICANT CHANGE UP (ref 40–120)
ALT FLD-CCNC: 13 U/L — SIGNIFICANT CHANGE UP (ref 10–45)
ANION GAP SERPL CALC-SCNC: 13 MMOL/L — SIGNIFICANT CHANGE UP (ref 5–17)
APTT BLD: 32.3 SEC — SIGNIFICANT CHANGE UP (ref 24.5–35.6)
APTT BLD: 76.4 SEC — HIGH (ref 24.5–35.6)
AST SERPL-CCNC: 18 U/L — SIGNIFICANT CHANGE UP (ref 10–40)
BASOPHILS # BLD AUTO: 0.03 K/UL — SIGNIFICANT CHANGE UP (ref 0–0.2)
BASOPHILS NFR BLD AUTO: 0.6 % — SIGNIFICANT CHANGE UP (ref 0–2)
BILIRUB SERPL-MCNC: 0.5 MG/DL — SIGNIFICANT CHANGE UP (ref 0.2–1.2)
BUN SERPL-MCNC: 15 MG/DL — SIGNIFICANT CHANGE UP (ref 7–23)
CALCIUM SERPL-MCNC: 9.8 MG/DL — SIGNIFICANT CHANGE UP (ref 8.4–10.5)
CHLORIDE SERPL-SCNC: 108 MMOL/L — SIGNIFICANT CHANGE UP (ref 96–108)
CO2 SERPL-SCNC: 25 MMOL/L — SIGNIFICANT CHANGE UP (ref 22–31)
CREAT SERPL-MCNC: 0.8 MG/DL — SIGNIFICANT CHANGE UP (ref 0.5–1.3)
EGFR: 97 ML/MIN/1.73M2 — SIGNIFICANT CHANGE UP
EOSINOPHIL # BLD AUTO: 0.11 K/UL — SIGNIFICANT CHANGE UP (ref 0–0.5)
EOSINOPHIL NFR BLD AUTO: 2.2 % — SIGNIFICANT CHANGE UP (ref 0–6)
GLUCOSE BLDC GLUCOMTR-MCNC: 79 MG/DL — SIGNIFICANT CHANGE UP (ref 70–99)
GLUCOSE BLDC GLUCOMTR-MCNC: 88 MG/DL — SIGNIFICANT CHANGE UP (ref 70–99)
GLUCOSE SERPL-MCNC: 88 MG/DL — SIGNIFICANT CHANGE UP (ref 70–99)
HCT VFR BLD CALC: 36.1 % — LOW (ref 39–50)
HCT VFR BLD CALC: 39.1 % — SIGNIFICANT CHANGE UP (ref 39–50)
HGB BLD-MCNC: 11.8 G/DL — LOW (ref 13–17)
HGB BLD-MCNC: 12.6 G/DL — LOW (ref 13–17)
IMM GRANULOCYTES NFR BLD AUTO: 0.2 % — SIGNIFICANT CHANGE UP (ref 0–0.9)
INR BLD: 1.03 RATIO — SIGNIFICANT CHANGE UP (ref 0.85–1.18)
LYMPHOCYTES # BLD AUTO: 1.7 K/UL — SIGNIFICANT CHANGE UP (ref 1–3.3)
LYMPHOCYTES # BLD AUTO: 33.5 % — SIGNIFICANT CHANGE UP (ref 13–44)
MCHC RBC-ENTMCNC: 28.6 PG — SIGNIFICANT CHANGE UP (ref 27–34)
MCHC RBC-ENTMCNC: 28.8 PG — SIGNIFICANT CHANGE UP (ref 27–34)
MCHC RBC-ENTMCNC: 32.2 GM/DL — SIGNIFICANT CHANGE UP (ref 32–36)
MCHC RBC-ENTMCNC: 32.7 GM/DL — SIGNIFICANT CHANGE UP (ref 32–36)
MCV RBC AUTO: 87.6 FL — SIGNIFICANT CHANGE UP (ref 80–100)
MCV RBC AUTO: 89.5 FL — SIGNIFICANT CHANGE UP (ref 80–100)
MONOCYTES # BLD AUTO: 0.52 K/UL — SIGNIFICANT CHANGE UP (ref 0–0.9)
MONOCYTES NFR BLD AUTO: 10.3 % — SIGNIFICANT CHANGE UP (ref 2–14)
NEUTROPHILS # BLD AUTO: 2.7 K/UL — SIGNIFICANT CHANGE UP (ref 1.8–7.4)
NEUTROPHILS NFR BLD AUTO: 53.2 % — SIGNIFICANT CHANGE UP (ref 43–77)
NRBC # BLD: 0 /100 WBCS — SIGNIFICANT CHANGE UP (ref 0–0)
NRBC # BLD: 0 /100 WBCS — SIGNIFICANT CHANGE UP (ref 0–0)
NT-PROBNP SERPL-SCNC: 129 PG/ML — SIGNIFICANT CHANGE UP (ref 0–300)
PLATELET # BLD AUTO: 144 K/UL — LOW (ref 150–400)
PLATELET # BLD AUTO: 164 K/UL — SIGNIFICANT CHANGE UP (ref 150–400)
POTASSIUM SERPL-MCNC: 4.5 MMOL/L — SIGNIFICANT CHANGE UP (ref 3.5–5.3)
POTASSIUM SERPL-SCNC: 4.5 MMOL/L — SIGNIFICANT CHANGE UP (ref 3.5–5.3)
PROT SERPL-MCNC: 7.2 G/DL — SIGNIFICANT CHANGE UP (ref 6–8.3)
PROTHROM AB SERPL-ACNC: 10.8 SEC — SIGNIFICANT CHANGE UP (ref 9.5–13)
RBC # BLD: 4.12 M/UL — LOW (ref 4.2–5.8)
RBC # BLD: 4.37 M/UL — SIGNIFICANT CHANGE UP (ref 4.2–5.8)
RBC # FLD: 13.5 % — SIGNIFICANT CHANGE UP (ref 10.3–14.5)
RBC # FLD: 13.5 % — SIGNIFICANT CHANGE UP (ref 10.3–14.5)
SODIUM SERPL-SCNC: 146 MMOL/L — HIGH (ref 135–145)
TROPONIN T, HIGH SENSITIVITY RESULT: 9 NG/L — SIGNIFICANT CHANGE UP (ref 0–51)
TROPONIN T, HIGH SENSITIVITY RESULT: 9 NG/L — SIGNIFICANT CHANGE UP (ref 0–51)
WBC # BLD: 4.81 K/UL — SIGNIFICANT CHANGE UP (ref 3.8–10.5)
WBC # BLD: 5.07 K/UL — SIGNIFICANT CHANGE UP (ref 3.8–10.5)
WBC # FLD AUTO: 4.81 K/UL — SIGNIFICANT CHANGE UP (ref 3.8–10.5)
WBC # FLD AUTO: 5.07 K/UL — SIGNIFICANT CHANGE UP (ref 3.8–10.5)

## 2024-09-16 PROCEDURE — 71045 X-RAY EXAM CHEST 1 VIEW: CPT | Mod: 26

## 2024-09-16 PROCEDURE — 99291 CRITICAL CARE FIRST HOUR: CPT | Mod: GC

## 2024-09-16 PROCEDURE — 99223 1ST HOSP IP/OBS HIGH 75: CPT

## 2024-09-16 PROCEDURE — 99223 1ST HOSP IP/OBS HIGH 75: CPT | Mod: GC

## 2024-09-16 RX ORDER — TAMSULOSIN HYDROCHLORIDE 0.4 MG/1
0.4 CAPSULE ORAL AT BEDTIME
Refills: 0 | Status: DISCONTINUED | OUTPATIENT
Start: 2024-09-16 | End: 2024-09-18

## 2024-09-16 RX ORDER — GEMFIBROZIL 600 MG
1 TABLET ORAL
Refills: 0 | DISCHARGE

## 2024-09-16 RX ORDER — ROSUVASTATIN CALCIUM 10 MG/1
40 TABLET ORAL AT BEDTIME
Refills: 0 | Status: DISCONTINUED | OUTPATIENT
Start: 2024-09-16 | End: 2024-09-18

## 2024-09-16 RX ORDER — NITROGLYCERIN 0.2MG/HR
0.4 PATCH, TRANSDERMAL 24 HOURS TRANSDERMAL
Refills: 0 | Status: DISCONTINUED | OUTPATIENT
Start: 2024-09-16 | End: 2024-09-18

## 2024-09-16 RX ORDER — PANCRELIPASE 16000; 60500; 57500 [USP'U]/1; [USP'U]/1; [USP'U]/1
1 CAPSULE, DELAYED RELEASE ORAL
Refills: 0 | DISCHARGE

## 2024-09-16 RX ORDER — NITROGLYCERIN 0.2MG/HR
0.4 PATCH, TRANSDERMAL 24 HOURS TRANSDERMAL ONCE
Refills: 0 | Status: DISCONTINUED | OUTPATIENT
Start: 2024-09-16 | End: 2024-09-18

## 2024-09-16 RX ORDER — OLMESARTAN MEDOXOMIL 40 MG/1
1 TABLET ORAL
Refills: 0 | DISCHARGE

## 2024-09-16 RX ORDER — LORAZEPAM 4 MG/ML
2 INJECTION INTRAMUSCULAR; INTRAVENOUS ONCE
Refills: 0 | Status: DISCONTINUED | OUTPATIENT
Start: 2024-09-16 | End: 2024-09-16

## 2024-09-16 RX ORDER — METOPROLOL TARTRATE 100 MG/1
50 TABLET ORAL DAILY
Refills: 0 | Status: DISCONTINUED | OUTPATIENT
Start: 2024-09-16 | End: 2024-09-18

## 2024-09-16 RX ORDER — GLUCAGON INJECTION, SOLUTION 1 MG/.2ML
1 INJECTION, SOLUTION SUBCUTANEOUS ONCE
Refills: 0 | Status: DISCONTINUED | OUTPATIENT
Start: 2024-09-16 | End: 2024-09-18

## 2024-09-16 RX ORDER — HEPARIN SODIUM,BOVINE 1000/ML
VIAL (ML) INJECTION
Qty: 25000 | Refills: 0 | Status: DISCONTINUED | OUTPATIENT
Start: 2024-09-16 | End: 2024-09-17

## 2024-09-16 RX ORDER — RANOLAZINE 500 MG/1
1000 TABLET, FILM COATED, EXTENDED RELEASE ORAL
Refills: 0 | Status: DISCONTINUED | OUTPATIENT
Start: 2024-09-16 | End: 2024-09-18

## 2024-09-16 RX ORDER — DEXTROSE 15 G/33 G
25 GEL IN PACKET (GRAM) ORAL ONCE
Refills: 0 | Status: DISCONTINUED | OUTPATIENT
Start: 2024-09-16 | End: 2024-09-18

## 2024-09-16 RX ORDER — HEPARIN SODIUM,BOVINE 1000/ML
5000 VIAL (ML) INJECTION ONCE
Refills: 0 | Status: COMPLETED | OUTPATIENT
Start: 2024-09-16 | End: 2024-09-16

## 2024-09-16 RX ORDER — SEMAGLUTIDE 1 MG/.5ML
0.5 INJECTION, SOLUTION SUBCUTANEOUS
Refills: 0 | DISCHARGE

## 2024-09-16 RX ORDER — DOCUSATE CALCIUM 240 MG/1
1 CAPSULE ORAL
Refills: 0 | DISCHARGE

## 2024-09-16 RX ORDER — LINACLOTIDE 72 UG/1
1 CAPSULE, GELATIN COATED ORAL
Refills: 0 | DISCHARGE

## 2024-09-16 RX ORDER — GABAPENTIN 100 MG
1 CAPSULE ORAL
Refills: 0 | DISCHARGE

## 2024-09-16 RX ORDER — OMEPRAZOLE 40 MG/1
1 CAPSULE, DELAYED RELEASE ORAL
Refills: 0 | DISCHARGE

## 2024-09-16 RX ORDER — ASPIRIN 81 MG
81 TABLET, DELAYED RELEASE (ENTERIC COATED) ORAL DAILY
Refills: 0 | Status: DISCONTINUED | OUTPATIENT
Start: 2024-09-16 | End: 2024-09-18

## 2024-09-16 RX ORDER — CYANOCOBALAMIN (VITAMIN B-12) 500MCG/0.1
1000 GEL (ML) NASAL
Refills: 0 | DISCHARGE

## 2024-09-16 RX ADMIN — Medication 5000 UNIT(S): at 15:04

## 2024-09-16 RX ADMIN — ROSUVASTATIN CALCIUM 40 MILLIGRAM(S): 10 TABLET ORAL at 23:04

## 2024-09-16 RX ADMIN — Medication 1000 UNIT(S)/HR: at 15:05

## 2024-09-16 RX ADMIN — TAMSULOSIN HYDROCHLORIDE 0.4 MILLIGRAM(S): 0.4 CAPSULE ORAL at 23:05

## 2024-09-16 RX ADMIN — Medication 0.4 MILLIGRAM(S): at 14:29

## 2024-09-16 RX ADMIN — Medication 900 UNIT(S)/HR: at 22:18

## 2024-09-16 NOTE — CONSULT NOTE ADULT - ATTENDING COMMENTS
68 year old man with multiple coronary stents, multiple episodes of re-stenosis presents with recent onset chest pain. Troponin negative at 9, but based on history plan coronary angiography tomorrow.    To contact call Cardiology Fellow or Attending as listed on [email or web address] password: [email or web address].

## 2024-09-16 NOTE — H&P ADULT - MUSCULOSKELETAL
negative ROM intact/no joint erythema/no calf tenderness/normal gait/strength 5/5 bilateral upper extremities/strength 5/5 bilateral lower extremities

## 2024-09-16 NOTE — ED PROVIDER NOTE - PHYSICAL EXAMINATION
Saul Cruz DO (PGY1)   Physical Exam:    Gen: NAD, AOx3  Head: NCAT  HEENT: EOMI, PEERLA, pink and moist mucous membranes  Lung: CTAB, no respiratory distress, no wheezes/rhonchi/rales B/L  CV: RRR, no murmurs, rubs or gallops  Abd: soft, NT, ND, no guarding, no rigidity, no rebound tenderness, no CVA tenderness   MSK: no visible deformities, ROM normal in UE/LE, no back pain  Neuro: No focal sensory or motor deficits. Sensation intact to light touch all extremities.  Skin: Warm, well perfused, no rash, no leg swelling  Psych: normal affect, calm

## 2024-09-16 NOTE — ED ADULT NURSE NOTE - OBJECTIVE STATEMENT
Male 68 years old with past medical history of CAD with 11 Cardiac Stents on Plavix, HTN and DM came in for chest pain. Pt reports constant pressure/burning like chest pain 10/10 for 4 days associated with shortness of breathe. SOB wore on exertion. Denies sweating, cough, fever, chills, nausea or vomiting. Denies recent travel or sick contacts. Safety and comfort maintained. Call bell within easy reach. Will continue to monitor.

## 2024-09-16 NOTE — H&P ADULT - NEUROLOGICAL
cranial nerves II-XII intact/sensation intact/responds to pain/responds to verbal commands/deep reflexes intact/no spontaneous movement/upper extremity  reflex/lower extremity reflex details…

## 2024-09-16 NOTE — ED PROVIDER NOTE - PROGRESS NOTE DETAILS
EKG taken today indicates inferior lateral T wave inversions, similar to prior taken and December 2023, no changes in comparison to prior yoo: Negative for delta Trop, cards recommendation of sublingual nitroglycerin and start on heparin drip, plan for catheterization given the patient's unstable angina, admitted to medicine

## 2024-09-16 NOTE — H&P ADULT - ASSESSMENT
The patient is a 68M with h/o former cigarette smoker x 90PY,, CAD with multiple stents, DM2, HTN, HLD, GERD, gastric bypass surgery 5 years ago,CHRISTIANE use CPAP presenting with L chest pain, pressure like 5/10, constant , radiates to L arm associated with SOB for last 4 days. He also has been feeling dizziness on and off for the same period. In Dec 2023 he had last PCI. Per conversation he has extensive F/H/O CAD, has been compliant on meds. Denies, fever, recent travel, swelling of legs.  In ED he remains afebrile, VSS, EKG- Old T inv in 1, AVL, V5-6, Q in lead 2, Trop wnl. Started on IV heparin gtt, Cardiology evaluated.

## 2024-09-16 NOTE — ED PROVIDER NOTE - OBJECTIVE STATEMENT
Do go ahead and start the medicine that was called in by the dermatology office  Call me before the medicine runs out in 30 days.  So we can discuss how you are doing with this 68-year-old male with past medical history of 2 previous MIs, cardiac stent placement x 11, hyperlipidemia, presents today for 4 days worth of progressive chest pain.  Notes that his chest pain is inside the left side in his midsternal area and radiates to his left shoulder.  Concurrently he also feels short of breath.  His shortness of breath and chest pain are present at rest, and he notes that both of them are worse with exertion.  He has associated dizziness that came alongside his symptoms.  He denies cough, hemoptysis, fever, chills, night sweats, palpitations, nausea, vomiting, abdominal pain, dysuria, pedal edema

## 2024-09-16 NOTE — H&P ADULT - PROBLEM/PLAN-2
"Chief Complaint   Patient presents with     Recheck Medication       Initial /72 (BP Location: Left arm, Patient Position: Sitting, Cuff Size: Adult Regular)   Pulse 64   Temp 96.5  F (35.8  C) (Tympanic)   Ht 1.651 m (5' 5\")   Wt 96.2 kg (212 lb)   SpO2 98%   BMI 35.28 kg/m   Estimated body mass index is 35.28 kg/m  as calculated from the following:    Height as of this encounter: 1.651 m (5' 5\").    Weight as of this encounter: 96.2 kg (212 lb).  Medication Reconciliation: complete  Aissatou Velez LPN  "
DISPLAY PLAN FREE TEXT

## 2024-09-16 NOTE — ED ADULT NURSE NOTE - NSFALLUNIVINTERV_ED_ALL_ED
Bed/Stretcher in lowest position, wheels locked, appropriate side rails in place/Call bell, personal items and telephone in reach/Instruct patient to call for assistance before getting out of bed/chair/stretcher/Non-slip footwear applied when patient is off stretcher/[place] to call system/Physically safe environment - no spills, clutter or unnecessary equipment/Purposeful proactive rounding/Room/bathroom lighting operational, light cord in reach

## 2024-09-16 NOTE — CONSULT NOTE ADULT - ASSESSMENT
68M w/ PMH of multiple MIs s/p PCI (HOMA to LAD, rPL, RCA, D1), HLD presenting with typical chest pain, first troponin negative. EKG similar to prior. However, given significant cardiac history and ongoing chest pain, would treat this as unstable angina/ ACS.     Recs:  -c/w ASA, plavix, start heparin gtt  -c/w rosuva, check lipid panel, A1C  -trend troponins, serial EKGs  -can trial SLN for chest pain  -c/w toprol 50  -c/w ranexa  -plan for expedited LHC today   68M w/ PMH of multiple MIs s/p PCI (HOMA to LAD, rPL, RCA, D1), HLD presenting with typical chest pain, first troponin negative. EKG similar to prior. However, given significant cardiac history and ongoing chest pain, would treat this as unstable angina/ ACS.     Recs:  -c/w ASA, plavix, start heparin gtt  -c/w rosuva, check lipid panel, A1C  -trend troponins, serial EKGs  -can trial SLN for chest pain  -c/w toprol 50  -c/w ranexa  -plan for [facility]

## 2024-09-16 NOTE — CONSULT NOTE ADULT - SUBJECTIVE AND OBJECTIVE BOX
Michel Mi MD  Cardiology Fellow  [phone]  All Cardiology service information can be found 24/7 on [email or web address], password: [email or web address]    Patient seen and evaluated at bedside    HPI: 68M w/ PMH of multiple MIs s/p PCI (HOMA to LAD, rPL, RCA, D1), HLD presenting with chest pain, typical, started 4 days ago. Worsens with exertion, improves with nitro. Initial trop negative. Pain is similar to prior.     Cardiac Meds: plavix,, ranexa, rosuvastatin, toprol 50, asa    PMHx:   HTN    Diabetes Mellitus    GERD (Gastroesophageal Reflux Disease)    Heart Attack    CAD (Coronary Artery Disease)    Coronary Stent    Hypercholesterolemia    H/O: GI Bleed    History of coronary angiogram    Hemorrhoids    Former smoker    CHRISTIANE (obstructive sleep apnea)        PSHx:   GERD (Gastroesophageal Reflux Disease)    No Past Surgical History    Stented coronary artery    Benign essential HTN    H/O gastric bypass      Allergies:  No Known Allergies      Current Medications:   nitroglycerin     SubLingual 0.4 milliGRAM(s) SubLingual every 5 minutes      FAMILY HISTORY:  Family history of cardiac disorder (Grandparent)    Family history of diabetes mellitus    FH: heart attack (Father, Mother)        REVIEW OF SYSTEMS:  CONSTITUTIONAL: No weakness, fevers or chills  EYES/ENT: No visual changes;  No dysphagia  NECK: No pain or stiffness  RESPIRATORY: No cough, wheezing, hemoptysis; No shortness of breath  CARDIOVASCULAR: No chest pain or palpitations; No lower extremity edema  GASTROINTESTINAL: No abdominal or epigastric pain. No nausea, vomiting, or hematemesis; No diarrhea or constipation. No melena or hematochezia.  BACK: No back pain  GENITOURINARY: No dysuria, frequency or hematuria  NEUROLOGICAL: No numbness or weakness  SKIN: No itching, burning, rashes, or lesions   All other review of systems is negative unless indicated above.    Physical Exam:  T(F): 97.8 (09-16), Max: 97.8 (09-16)  HR: 68 (09-16) (59 - 68)  BP: 126/69 (09-16) (123/73 - 126/69)  RR: 20 (09-16)  SpO2: 98% (09-16)  GEN: NAD  HEENT: EOMI, clear sclera  PULM: CTA b/l, no wheeze  CV: RRR S1 S2, no murmur, no JVD  ABD: S, NT, ND  EXT: WWP, no edema  PSYCH: normal affect  SKIN: No rash    CXR: Personally reviewed    Labs: Personally reviewed                        12.6   5.07  )-----------( 164      ( 16 Sep 2024 12:48 )             39.1     09-16    146<H>  |  108  |  15  ----------------------------<  88  4.5   |  25  |  0.80    Ca    9.8      16 Sep 2024 12:48    TPro  7.2  /  Alb  4.3  /  TBili  0.5  /  DBili  x   /  AST  18  /  ALT  13  /  AlkPhos  63  09-16    PT/INR - ( 16 Sep 2024 12:48 )   PT: 10.8 sec;   INR: 1.03 ratio         PTT - ( 16 Sep 2024 12:48 )  PTT:32.3 sec    CARDIAC MARKERS ( 16 Sep 2024 12:48 )  9 ng/L / x     / x     / x     / x     / x

## 2024-09-16 NOTE — ED PROVIDER NOTE - CLINICAL SUMMARY MEDICAL DECISION MAKING FREE TEXT BOX
68-year-old male with past medical history of 2 previous MIs, cardiac stent placement x 11, hyperlipidemia, presents today for 4 days worth of progressive chest pain    Physical examination is unremarkable unless noted above    Differential diagnosis includes but is not limited to ACS, unstable angina, especially given the significant cardiac history    Plan includes labs and imaging for the above, reassess 68-year-old male with past medical history of 2 previous MIs, cardiac stent placement x 11, hyperlipidemia, presents today for 4 days worth of progressive chest pain    Physical examination is unremarkable unless noted above    Differential diagnosis includes but is not limited to ACS, unstable angina, especially given the significant cardiac history    Plan includes labs and imaging for the above, reassess    MAX Gomez MD: Agree with resident/ACP MDM, assessment and plan as above.

## 2024-09-16 NOTE — H&P ADULT - HISTORY OF PRESENT ILLNESS
The patient is a 68M with h/o former cigarette smoker x 90PY,, CAD with multiple stents, DM2, HTN, HLD, GERD, gastric bypass surgery 5 years ago,CHRISTIANE use CPAP presenting with L chest pain, pressure like 5/10, constant , radiates to L arm associated with SOB for last 4 days. He also has been feeling dizziness on and off for the same period. In Dec 2023 he had last PCI. Per conversation he has extensive F/H/O CAD, has been compliant on meds. Denies, fever, recent travel, swelling of legs.

## 2024-09-16 NOTE — H&P ADULT - PROBLEM SELECTOR PLAN 1
EKG- Old T inv in 1, AVL, V5-6, Q in lead 2, Trop wnl. Hemodynamically stable  - Cardiology evaluated, recommended to c/w DAPT/Statin, BB, IV heparin gtt, SL NTG prn  - LHC today EKG- Old T inv in 1, AVL, V5-6, Q in lead 2, Trop wnl. Hemodynamically stable  - Cardiology evaluated, recommended to c/w DAPT/Statin, Metoprolol, Ranexa, IV heparin gtt, and SL NTG prn  - LHC today per Card

## 2024-09-17 ENCOUNTER — TRANSCRIPTION ENCOUNTER (OUTPATIENT)
Age: 69
End: 2024-09-17

## 2024-09-17 LAB
A1C WITH ESTIMATED AVERAGE GLUCOSE RESULT: 6.1 % — HIGH (ref 4–5.6)
A1C WITH ESTIMATED AVERAGE GLUCOSE RESULT: 6.3 % — HIGH (ref 4–5.6)
ALBUMIN SERPL ELPH-MCNC: 4.1 G/DL — SIGNIFICANT CHANGE UP (ref 3.3–5)
ALP SERPL-CCNC: 60 U/L — SIGNIFICANT CHANGE UP (ref 40–120)
ALT FLD-CCNC: 14 U/L — SIGNIFICANT CHANGE UP (ref 10–45)
ANION GAP SERPL CALC-SCNC: 13 MMOL/L — SIGNIFICANT CHANGE UP (ref 5–17)
APTT BLD: 47.4 SEC — HIGH (ref 24.5–35.6)
APTT BLD: 63.4 SEC — HIGH (ref 24.5–35.6)
AST SERPL-CCNC: 14 U/L — SIGNIFICANT CHANGE UP (ref 10–40)
BILIRUB SERPL-MCNC: 0.6 MG/DL — SIGNIFICANT CHANGE UP (ref 0.2–1.2)
BUN SERPL-MCNC: 15 MG/DL — SIGNIFICANT CHANGE UP (ref 7–23)
CALCIUM SERPL-MCNC: 9.6 MG/DL — SIGNIFICANT CHANGE UP (ref 8.4–10.5)
CHLORIDE SERPL-SCNC: 104 MMOL/L — SIGNIFICANT CHANGE UP (ref 96–108)
CHOLEST SERPL-MCNC: 100 MG/DL — SIGNIFICANT CHANGE UP
CHOLEST SERPL-MCNC: 96 MG/DL — SIGNIFICANT CHANGE UP
CO2 SERPL-SCNC: 22 MMOL/L — SIGNIFICANT CHANGE UP (ref 22–31)
CREAT SERPL-MCNC: 0.7 MG/DL — SIGNIFICANT CHANGE UP (ref 0.5–1.3)
EGFR: 101 ML/MIN/1.73M2 — SIGNIFICANT CHANGE UP
ESTIMATED AVERAGE GLUCOSE: 128 MG/DL — HIGH (ref 68–114)
ESTIMATED AVERAGE GLUCOSE: 134 MG/DL — HIGH (ref 68–114)
GLUCOSE BLDC GLUCOMTR-MCNC: 87 MG/DL — SIGNIFICANT CHANGE UP (ref 70–99)
GLUCOSE BLDC GLUCOMTR-MCNC: 89 MG/DL — SIGNIFICANT CHANGE UP (ref 70–99)
GLUCOSE BLDC GLUCOMTR-MCNC: 92 MG/DL — SIGNIFICANT CHANGE UP (ref 70–99)
GLUCOSE BLDC GLUCOMTR-MCNC: 93 MG/DL — SIGNIFICANT CHANGE UP (ref 70–99)
GLUCOSE SERPL-MCNC: 90 MG/DL — SIGNIFICANT CHANGE UP (ref 70–99)
HCT VFR BLD CALC: 37.3 % — LOW (ref 39–50)
HCT VFR BLD CALC: 38.6 % — LOW (ref 39–50)
HDLC SERPL-MCNC: 37 MG/DL — LOW
HDLC SERPL-MCNC: 38 MG/DL — LOW
HGB BLD-MCNC: 12 G/DL — LOW (ref 13–17)
HGB BLD-MCNC: 12.3 G/DL — LOW (ref 13–17)
LIPID PNL WITH DIRECT LDL SERPL: 44 MG/DL — SIGNIFICANT CHANGE UP
LIPID PNL WITH DIRECT LDL SERPL: 45 MG/DL — SIGNIFICANT CHANGE UP
MCHC RBC-ENTMCNC: 28.7 PG — SIGNIFICANT CHANGE UP (ref 27–34)
MCHC RBC-ENTMCNC: 28.8 PG — SIGNIFICANT CHANGE UP (ref 27–34)
MCHC RBC-ENTMCNC: 31.9 GM/DL — LOW (ref 32–36)
MCHC RBC-ENTMCNC: 32.2 GM/DL — SIGNIFICANT CHANGE UP (ref 32–36)
MCV RBC AUTO: 89.4 FL — SIGNIFICANT CHANGE UP (ref 80–100)
MCV RBC AUTO: 90 FL — SIGNIFICANT CHANGE UP (ref 80–100)
NON HDL CHOLESTEROL: 58 MG/DL — SIGNIFICANT CHANGE UP
NON HDL CHOLESTEROL: 63 MG/DL — SIGNIFICANT CHANGE UP
NRBC # BLD: 0 /100 WBCS — SIGNIFICANT CHANGE UP (ref 0–0)
NRBC # BLD: 0 /100 WBCS — SIGNIFICANT CHANGE UP (ref 0–0)
PLATELET # BLD AUTO: 145 K/UL — LOW (ref 150–400)
PLATELET # BLD AUTO: 152 K/UL — SIGNIFICANT CHANGE UP (ref 150–400)
POTASSIUM SERPL-MCNC: 3.8 MMOL/L — SIGNIFICANT CHANGE UP (ref 3.5–5.3)
POTASSIUM SERPL-SCNC: 3.8 MMOL/L — SIGNIFICANT CHANGE UP (ref 3.5–5.3)
PROT SERPL-MCNC: 6.9 G/DL — SIGNIFICANT CHANGE UP (ref 6–8.3)
RBC # BLD: 4.17 M/UL — LOW (ref 4.2–5.8)
RBC # BLD: 4.29 M/UL — SIGNIFICANT CHANGE UP (ref 4.2–5.8)
RBC # FLD: 13.4 % — SIGNIFICANT CHANGE UP (ref 10.3–14.5)
RBC # FLD: 13.6 % — SIGNIFICANT CHANGE UP (ref 10.3–14.5)
SODIUM SERPL-SCNC: 139 MMOL/L — SIGNIFICANT CHANGE UP (ref 135–145)
TRIGL SERPL-MCNC: 64 MG/DL — SIGNIFICANT CHANGE UP
TRIGL SERPL-MCNC: 88 MG/DL — SIGNIFICANT CHANGE UP
TROPONIN T, HIGH SENSITIVITY RESULT: 9 NG/L — SIGNIFICANT CHANGE UP (ref 0–51)
WBC # BLD: 4.44 K/UL — SIGNIFICANT CHANGE UP (ref 3.8–10.5)
WBC # BLD: 4.5 K/UL — SIGNIFICANT CHANGE UP (ref 3.8–10.5)
WBC # FLD AUTO: 4.44 K/UL — SIGNIFICANT CHANGE UP (ref 3.8–10.5)
WBC # FLD AUTO: 4.5 K/UL — SIGNIFICANT CHANGE UP (ref 3.8–10.5)

## 2024-09-17 PROCEDURE — 93010 ELECTROCARDIOGRAM REPORT: CPT | Mod: 76

## 2024-09-17 PROCEDURE — 93454 CORONARY ARTERY ANGIO S&I: CPT | Mod: 26,59

## 2024-09-17 PROCEDURE — 99152 MOD SED SAME PHYS/QHP 5/>YRS: CPT

## 2024-09-17 PROCEDURE — 99233 SBSQ HOSP IP/OBS HIGH 50: CPT | Mod: 25,GC

## 2024-09-17 PROCEDURE — 92928 PRQ TCAT PLMT NTRAC ST 1 LES: CPT | Mod: LD

## 2024-09-17 PROCEDURE — 99233 SBSQ HOSP IP/OBS HIGH 50: CPT

## 2024-09-17 RX ORDER — SODIUM CHLORIDE 9 MG/ML
1000 INJECTION INTRAMUSCULAR; INTRAVENOUS; SUBCUTANEOUS
Refills: 0 | Status: DISCONTINUED | OUTPATIENT
Start: 2024-09-17 | End: 2024-09-18

## 2024-09-17 RX ORDER — SODIUM CHLORIDE 9 MG/ML
250 INJECTION INTRAMUSCULAR; INTRAVENOUS; SUBCUTANEOUS ONCE
Refills: 0 | Status: COMPLETED | OUTPATIENT
Start: 2024-09-17 | End: 2024-09-17

## 2024-09-17 RX ORDER — SODIUM CHLORIDE 9 MG/ML
1000 INJECTION INTRAMUSCULAR; INTRAVENOUS; SUBCUTANEOUS
Refills: 0 | Status: COMPLETED | OUTPATIENT
Start: 2024-09-17 | End: 2024-09-17

## 2024-09-17 RX ORDER — LOSARTAN POTASSIUM 50 MG/1
100 TABLET ORAL DAILY
Refills: 0 | Status: DISCONTINUED | OUTPATIENT
Start: 2024-09-17 | End: 2024-09-18

## 2024-09-17 RX ADMIN — Medication 900 UNIT(S)/HR: at 04:14

## 2024-09-17 RX ADMIN — ROSUVASTATIN CALCIUM 40 MILLIGRAM(S): 10 TABLET ORAL at 21:45

## 2024-09-17 RX ADMIN — RANOLAZINE 1000 MILLIGRAM(S): 500 TABLET, FILM COATED, EXTENDED RELEASE ORAL at 04:54

## 2024-09-17 RX ADMIN — RANOLAZINE 1000 MILLIGRAM(S): 500 TABLET, FILM COATED, EXTENDED RELEASE ORAL at 17:07

## 2024-09-17 RX ADMIN — SODIUM CHLORIDE 750 MILLILITER(S): 9 INJECTION INTRAMUSCULAR; INTRAVENOUS; SUBCUTANEOUS at 09:00

## 2024-09-17 RX ADMIN — TAMSULOSIN HYDROCHLORIDE 0.4 MILLIGRAM(S): 0.4 CAPSULE ORAL at 21:45

## 2024-09-17 RX ADMIN — Medication 81 MILLIGRAM(S): at 07:57

## 2024-09-17 RX ADMIN — SODIUM CHLORIDE 100 MILLILITER(S): 9 INJECTION INTRAMUSCULAR; INTRAVENOUS; SUBCUTANEOUS at 11:08

## 2024-09-17 RX ADMIN — METOPROLOL TARTRATE 50 MILLIGRAM(S): 100 TABLET ORAL at 04:54

## 2024-09-17 RX ADMIN — Medication 75 MILLIGRAM(S): at 07:57

## 2024-09-17 NOTE — DISCHARGE NOTE PROVIDER - CARE PROVIDER_API CALL
[facility]  Cardiovascular Disease  [address] [address]  Phone: [phone]  Fax: [phone]  Follow Up Time: 2 weeks

## 2024-09-17 NOTE — PATIENT PROFILE ADULT - FALL HARM RISK - HARM RISK INTERVENTIONS

## 2024-09-17 NOTE — DISCHARGE NOTE PROVIDER - HOSPITAL COURSE
The patient is a 68M with h/o former cigarette smoker x 90PY,, CAD with multiple stents, DM2, HTN, HLD, GERD, gastric bypass surgery 5 years ago,CHRISTIANE use CPAP presenting with L chest pain, pressure like 5/10, constant , radiates to L arm associated with SOB for last 4 days. He also has been feeling dizziness on and off for the same period. In Dec 2023 he had last PCI. Per conversation he has extensive F/H/O CAD, has been compliant on meds. Denies, fever, recent travel, swelling of legs. Now presents for [facility] today Successful HOMA to LAD via RFA No heavy lifting for 2 weeks, no strenuous activity  or unnecessary stair climbing, no driving for x 2 days,  you may shower 24 hours following procedure but no bathing or swimming for x1  week, no bending, no straining while having a Bowel movement, No strenuous sexual activity for x 1 week check groin for bleeding, pain, tightness or ( golf ball size)  swelling daily following procedure , & follow up with your cardiologist in 1-2 week   Staged HOMA to RCA on 9/18/24         Cardiac Rehab (STEMI/NSTEMI/ACS/Unstable Angina/CHF/Chronic Stable Angina/Heart Surgery (CABG,Valve)/Post PCI):              *Education on benefits of Cardiac Rehab provided to patient: Yes         *Referral and Prescription Given for Cardiac Rehab : Yes         *Pt given list of locations & instructed to contact their insurance company to review list of participating providers: Yes         *Pt instructed to bring Cardiac Rehab prescription with them to Cardiology Follow up appointment for assistance with enrollment: Yes         *Pt discharged with copies detail cardiovascular history, medications, testing/treatments: Yes       The patient is a 68M with h/o former cigarette smoker x 90PY,, CAD with multiple stents, DM2, HTN, HLD, GERD, gastric bypass surgery 5 years ago,CHRISTIANE use CPAP presenting with L chest pain, pressure like 5/10, constant , radiates to L arm associated with SOB for last 4 days. He also has been feeling dizziness on and off for the same period. In Dec 2023 he had last PCI. Per conversation he has extensive F/H/O CAD, has been compliant on meds. Denies, fever, recent travel, swelling of legs.    9/17 cardiac cath with one stent to the LAD via right femoral artery access.  9/18 Staged PCI to RCA         Cardiac Rehab (STEMI/NSTEMI/ACS/Unstable Angina/CHF/Chronic Stable Angina/Heart Surgery (CABG,Valve)/Post PCI):              *Education on benefits of Cardiac Rehab provided to patient: Yes         *Referral and Prescription Given for Cardiac Rehab : Yes         *Pt given list of locations & instructed to contact their insurance company to review list of participating providers: Yes         *Pt instructed to bring Cardiac Rehab prescription with them to Cardiology Follow up appointment for assistance with enrollment: Yes         *Pt discharged with copies detail cardiovascular history, medications, testing/treatments: Yes       68M, FHx CAD & Hx HTN, HLD, Type 2 Diabetes, CAD s/p multiple PCIs, CHRISTIANE uses CPAP, GERD, s/p gastric bypass surgery presented w/ left chest pain; admitted for unstable angina. Pt underwent cardiac cath 9/18/24: HOMA x1 LAD via RFA. Staged PCI 9/18/24: POBA RCA via RRA. Radial band and femoral sheath removed per protocol w/o any complications; site stable -- soft, nontender, no hematoma. Continue ASA 81mg qd, Plavix 75mg qd, Rosuvastatin 40mg qhs. Denies any complaints at this time. Patient has been medically cleared for discharge as per Dr. Philippe and Dr. Yeh. Patient has been given appropriate discharge instructions including medication regimen, access site management and follow up. Medications that patient needs refills on (+/- new medications) have been e-prescribed to preferred pharmacy. Patient will f/u with Dr. Blue in 1-2 weeks for further management.     VSS  Gen: NAD, A&O x3  Cards: RRR, clear S1 and S2 without murmur  Pulm: CTA B/L without w/r/r  Vascular: Right wrist and groin w/o hematoma, ooze or bruit. Peripheral pulses 2+ B/L  Abd: soft, NT  Ext: no LE edema or ulcerations B/L    Cardiac Rehab (STEMI/NSTEMI/ACS/Unstable Angina/CHF/Chronic Stable Angina/Heart Surgery (CABG,Valve)/Post PCI):              *Education on benefits of Cardiac Rehab provided to patient: Yes         *Referral and Prescription Given for Cardiac Rehab : Yes         *Pt given list of locations & instructed to contact their insurance company to review list of participating providers         *Pt instructed to bring Cardiac Rehab prescription with them to Cardiology Follow up appointment for assistance with enrollment: Yes         *Pt discharged with copies detail cardiovascular history, medications, testing/treatments         *Reasons for NO Cardiac rehab referral rx

## 2024-09-17 NOTE — DISCHARGE NOTE PROVIDER - NSDCMRMEDTOKEN_GEN_ALL_CORE_FT
Aspirin Enteric Coated 81 mg oral delayed release tablet: 1 tab(s) orally once a day  Cardiac Rehabilitation: Cardiac Rehabilitation 2-3 sessions for 2 weeks Dx CAD s/p PCI  1 MDD: 1  clopidogrel 75 mg oral tablet: 1 tab(s) orally once a day  Crystal B-12 1000 mcg/mL injectable solution: 1,000 microgram(s) intramuscularly once a month  docusate sodium 100 mg oral capsule: 1 cap(s) orally once a day  Flomax 0.4 mg oral capsule: 1 cap(s) orally once a day  gabapentin 300 mg oral capsule: 1 cap(s) orally 2 times a day  gemfibrozil 600 mg oral tablet: 1 tab(s) orally once a day  Janumet 50 mg-500 mg oral tablet: 1 tab(s) orally once a day  linaclotide 145 mcg oral capsule: 1 cap(s) orally once a day  metFORMIN 500 mg oral tablet: 1 tab(s) orally 2 times a day Hold 48 hrs, Restart 12/7/23  metoprolol succinate 50 mg oral tablet, extended release: 1 tab(s) orally once a day  Multiple Vitamins oral tablet: 1 tab(s) orally once a day  nitroglycerin 0.4 mg sublingual tablet: 1 tab(s) sublingually every 5 minutes as needed, max 3 tabs  olmesartan 40 mg oral tablet: 1 tab(s) orally once a day  omeprazole 40 mg oral delayed release capsule: 1 cap(s) orally once a day  Ozempic 2 mg/1.5 mL (0.25 mg or 0.5 mg dose) subcutaneous solution: 0.5 milligram(s) subcutaneously once a week  Ranexa 1000 mg oral tablet, extended release: 1 tab(s) orally 2 times a day [pt reports often forgets to take 2nd dose for the day]  rosuvastatin 40 mg oral tablet: 1 tab(s) orally once a day  Zenpep 40,000 units-126,000 units-168,000 units oral delayed release capsule: 1 cap(s) orally 2 times a day   Aspirin Enteric Coated 81 mg oral delayed release tablet: 1 tab(s) orally once a day  Cardiac Rehabilitation: Cardiac Rehabilitation 2-3 sessions for 2 weeks Dx CAD s/p PCI  1 MDD: 1 session  clopidogrel 75 mg oral tablet: 1 tab(s) orally once a day  Crystal B-12 1000 mcg/mL injectable solution: 1,000 microgram(s) intramuscularly once a month  docusate sodium 100 mg oral capsule: 1 cap(s) orally once a day  Flomax 0.4 mg oral capsule: 1 cap(s) orally once a day  gabapentin 300 mg oral capsule: 1 cap(s) orally 2 times a day  gemfibrozil 600 mg oral tablet: 1 tab(s) orally once a day  Janumet 50 mg-500 mg oral tablet: 1 tab(s) orally once a day DO NOT TAKE on 9/19 or 9/20, restart on Saturday 9/21  linaclotide 145 mcg oral capsule: 1 cap(s) orally once a day  metoprolol succinate 50 mg oral tablet, extended release: 1 tab(s) orally once a day  Multiple Vitamins oral tablet: 1 tab(s) orally once a day  nitroglycerin 0.4 mg sublingual tablet: 1 tab(s) sublingually every 5 minutes as needed for angina/chest pain, max 3 tabs  olmesartan 40 mg oral tablet: 1 tab(s) orally once a day  omeprazole 40 mg oral delayed release capsule: 1 cap(s) orally once a day  Ozempic 2 mg/1.5 mL (0.25 mg or 0.5 mg dose) subcutaneous solution: 0.5 milligram(s) subcutaneously once a week  Ranexa 1000 mg oral tablet, extended release: 1 tab(s) orally 2 times a day [pt reports often forgets to take 2nd dose for the day]  rosuvastatin 40 mg oral tablet: 1 tab(s) orally once a day (at bedtime)  Zenpep 40,000 units-126,000 units-168,000 units oral delayed release capsule: 1 cap(s) orally 2 times a day   Aspirin Enteric Coated 81 mg oral delayed release tablet: 1 tab(s) orally once a day  Cardiac Rehabilitation: Cardiac Rehabilitation 2-3 sessions for 2 weeks Dx CAD s/p PCI  1 MDD: 1 session  clopidogrel 75 mg oral tablet: 1 tab(s) orally once a day  Crystal B-12 1000 mcg/mL injectable solution: 1,000 microgram(s) intramuscularly once a month  docusate sodium 100 mg oral capsule: 1 cap(s) orally once a day  Flomax 0.4 mg oral capsule: 1 cap(s) orally once a day  gabapentin 300 mg oral capsule: 1 cap(s) orally 2 times a day  gemfibrozil 600 mg oral tablet: 1 tab(s) orally once a day  Janumet 50 mg-500 mg oral tablet: 1 tab(s) orally once a day DO NOT TAKE on 9/19 or 9/20, restart on Saturday 9/21  linaclotide 145 mcg oral capsule: 1 cap(s) orally once a day  metFORMIN 500 mg oral tablet: 1 tab(s) orally 2 times a day RESUME 9/21/24  metoprolol succinate 50 mg oral tablet, extended release: 1 tab(s) orally once a day  Multiple Vitamins oral tablet: 1 tab(s) orally once a day  nitroglycerin 0.4 mg sublingual tablet: 1 tab(s) sublingually every 5 minutes as needed for angina/chest pain, max 3 tabs  olmesartan 40 mg oral tablet: 1 tab(s) orally once a day  omeprazole 40 mg oral delayed release capsule: 1 cap(s) orally once a day  Ozempic 2 mg/1.5 mL (0.25 mg or 0.5 mg dose) subcutaneous solution: 0.5 milligram(s) subcutaneously once a week  Ranexa 1000 mg oral tablet, extended release: 1 tab(s) orally 2 times a day [pt reports often forgets to take 2nd dose for the day]  rosuvastatin 40 mg oral tablet: 1 tab(s) orally once a day (at bedtime)  Zenpep 40,000 units-126,000 units-168,000 units oral delayed release capsule: 1 cap(s) orally 2 times a day

## 2024-09-17 NOTE — CHART NOTE - NSCHARTNOTEFT_GEN_A_CORE
Removal of Femoral Sheath    Pulses in the (right) lower extremity are (palpable). The patient was placed in the supine position. The insertion site was identified and the sutures were removed per protocol.  The __6__ [place] femoral sheath was then removed. Direct pressure was applied for  ___20___ minutes.     Monitoring of the (right) groin and both lower extremities including neuro-vascular checks and vital signs every 15 minutes x 4, then every 30 minutes x 2, then every 1 hour was ordered.    Complications: None Good Hemostasis noted      Comments: s/pDES to LAD   Staged RCA in am   Continue DAPT   ASA/Plavix  NO acute distress noted.  Continue to monitor closely     Elvia Scott NP  Cardiology

## 2024-09-17 NOTE — DISCHARGE NOTE PROVIDER - NSDCACTIVITY_GEN_ALL_CORE
No heavy lifting/straining Showering allowed/Walking - Indoors allowed/No heavy lifting/straining/Walking - Outdoors allowed many yrs ago

## 2024-09-17 NOTE — DISCHARGE NOTE PROVIDER - NSDCCPTREATMENT_GEN_ALL_CORE_FT
PRINCIPAL PROCEDURE  Procedure: Left heart cardiac catheterization  Findings and Treatment: HOMA to LAD No heavy lifting for 2 weeks, no strenuous activity  or uneccesary stair climbing, no driving for x 2 days,  you may shower 24 hours following procedure but no bathing or swimming for x1  week, no bending, no straining while having a Bowel movement, No strenuous sexual activity for x 1 week check groin for bleeding, pain, tightness or ( golf ball size)  swelling daily following procedure , & follow up with your cardiologist in 1-2 week     PRINCIPAL PROCEDURE  Procedure: Left heart cardiac catheterization  Findings and Treatment: Study Date:     09/17/2024   Cath Lab Report    Procedures Performed   Procedures:              1.    Diagnostic Coronary Angiography   2.    Arterial Access - Right Femoral   3.    Ultrasound Guided Access   4.    PCI: HOMA   Indications:               ACS greater than 24 hours   Unstable angina   PCI Status:               elective   Conclusions:   Successful PCI to the mLAD with HOMA   Planned staged PCI to the dRCA ISR with enrollment in the [facility]IC[facility] RCT.  DAPT   Diagnostic Findings:   Coronary Angiography   The coronary circulation is right dominant.    LM   Left main artery: Angiography shows no disease.    LAD   Proximal left anterior descending: Angiography shows mild  atherosclerosis. ISR. There is a 10 % stenosis. Mid left anterior  descending: Angiography shows severe atherosclerosis. There is a 90 % stenosis.  CX   Circumflex: There is a 10 % stenosis.    RCA   Distal right coronary artery: ISR. There is a 70 % stenosis.    9/18/24: staged PCI: POBA to RCA via RRA

## 2024-09-17 NOTE — PROGRESS NOTE ADULT - PROBLEM SELECTOR PLAN 1
EKG- Old T inv in 1, AVL, V5-6, Q in lead 2, Trop wnl. Hemodynamically stable  - Cardiology evaluated, recommended to c/w DAPT/Statin, Metoprolol, Ranexa, IV heparin gtt, and SL NTG prn  - s/p [facility] 9/17 PCI to LAD. Planned for staged PCI to RCA 9/18

## 2024-09-17 NOTE — PROGRESS NOTE ADULT - ATTENDING COMMENTS
68 year old man with multiple coronary stents, multiple episodes of re-stenosis presents with recent onset chest pain. Troponin negative at 9, but based on history arramged coronary angiography accomplished this morning with stent to LAD distal to site of prior stent and plan return to lab tomorrow for staged intervention.    To contact call Cardiology Fellow or Attending as listed on [email or web address] password: [email or web address].

## 2024-09-17 NOTE — DISCHARGE NOTE PROVIDER - NSDCCPCAREPLAN_GEN_ALL_CORE_FT
PRINCIPAL DISCHARGE DIAGNOSIS  Diagnosis: Unstable angina pectoris  Assessment and Plan of Treatment: Low salt, low fat diet.   Weight management.   Take medications as prescribed.    No smoking.  Follow up appointments with your doctor(s)  as instruced.      SECONDARY DISCHARGE DIAGNOSES  Diagnosis: HTN (hypertension)  Assessment and Plan of Treatment: Continue with your blood pressure medications; eat a heart healthy diet with low salt diet; exercise regularly (consult with your physician or cardiologist first); maintain a heart healthy weight; if you smoke - quit (A resource to help you stop smoking is the [facility] [employer] [facility] – phone number [phone].); include healthy ways to manage stress. Continue to follow with your primary care physician or cardiologist. Your blood pressure will be controlled.    Diagnosis: Hyperlipidemia  Assessment and Plan of Treatment: Continue with your cholesterol medications. Eat a heart healthy diet that is low in saturated fats and salt, and includes whole grains, fruits, vegetables and lean protein; exercise regularly (consult with your physician or cardiologist first); maintain a heart healthy weight; if you smoke - quit (A resource to help you stop smoking is the [facility] [employer] [facility] – phone number [phone].). Continue to follow with your primary physician or cardiologist. Your LDL cholesterol will be less than 70mg/dL    Diagnosis: Type 2 diabetes mellitus  Assessment and Plan of Treatment: Continue to follow with your primary care MD or your endocrinologist. Discuss what the goal hemoglobin A1C level is for you.  Follow a heart healthy diabetic diet. If you check your fingerstick glucose at home, call your MD if it is greater than 250mg/dL on 2 occasions or less than 100mg/dL on 2 occasions. Know signs of low blood sugar, such as: dizziness, shakiness, sweating, confusion, hunger, nervousness- drink 4 ounces apple juice if occurs and call your doctor. Know early signs of high blood sugar, such as: frequent urination, increased thirst, blurry vision, fatigue, headache - call your doctor if this occurs. Your hemoglobin A1C will be at a normal range (normal range is from 6-8)     PRINCIPAL DISCHARGE DIAGNOSIS  Diagnosis: Unstable angina pectoris  Assessment and Plan of Treatment: You came to the hospital because you were experiencing chest pain. Your troponin level (a cardiac enzyme that if positive can indicate cardiac injury) was negative. Your chest x-ray was negative. You were seen by the cardiology team and underwent a cardiac catheterization.   You have a diagnosis of coronary artery disease and underwent a cardiac catheterization where you received a stent to your left anterior descending coronary artery and a balloon angioplasty to your right coronary artery. You have been started on Aspirin 81mg daily, Plavix 75mg daily. Continue taking all medications as prescribed.   The procedure was done through the wrist/groin. Please avoid any heavy lifting (no more than 3 to 5 lbs), strenuous activity, bending, straining, or unnecessary stair climbing for 2 weeks. No driving for 2 days. You may shower 24 hours following the procedure but avoid baths/swimming for 1 week. If you develop any swelling, bleeding, hardening of the skin (hematoma formation), acute pain, numbness/tingling in your arm or leg, or have any questions/concerns regarding your procedure, please call [facility] [phone]  NEVER miss a dose of Aspirin and Plavix to ensure your stent does not close. DO NOT STOP THESE MEDICATIONS FOR ANY REASON UNLESS OTHERWISE INDICATED BY YOUR CARDIOLOGIST BECAUSE THIS WILL PUT YOU AT RISK OF YOUR STENT CLOSING AND HAVING A HEART ATTACK OR SUDDEN SEVERE LEG PAIN DUE TO BLOCKAGE OF BLOOD FLOW TO LEG.  You have been given a Stent Card to carry with you in your wallet.  Make Photocopies or take a picture of card so you have a backup copy.  This card has important information for any possible future Radiology/MRI studies.    Please make a follow up appointment with your cardiologist within 1-2 weeks of your discharge. All of your prescriptions have been sent electronically to your pharmacy.      SECONDARY DISCHARGE DIAGNOSES  Diagnosis: Encounter for cardiac rehabilitation  Assessment and Plan of Treatment: We have provided you with a prescription for cardiac rehab which is medically supervised exercise program for your heart and has been shown to improve the quantity and quality of life of people with heart disease like yours. You should attend cardiac rehab 3 times per week for 12 weeks. We have provided you with a list of nearby facilities. Please call your insurance carrier to determine which of these facilities are covered under your plan. Please bring this prescription with you to your follow up appointment with your cardiologist who can then further assist you to enroll into a cardiac rehab program.    Diagnosis: HTN (hypertension)  Assessment and Plan of Treatment: You have high blood pressure. Continue taking your blood pressure medications as prescribed. Eat a heart healthy diet with low salt; exercise regularly (if cleared by your primary care doctor or cardiologist). Maintain a heart healthy weight and include healthy ways to manage stress. If you smoke, quit. If you need assistance to help you stop smoking, please use the following resource: [facility] – ([phone]). Follow up with your primary care doctor to continue having your blood pressure checked on a continual basis.    Diagnosis: Hyperlipidemia  Assessment and Plan of Treatment: LDL<70   Goal is to keep LDL<70. Continue with your cholesterol medications as prescribed. Eat a heart healthy diet that is low in saturated fats and salt, and includes whole grains, fruits, vegetables and lean protein; exercise regularly (consult with your physician or cardiologist first); maintain a heart healthy weight; if you smoke - quit (A resource to help you stop smoking is the [facility] [employer] [facility] [employer] [facility] – phone number [phone].). Continue to follow with your primary physician or cardiologist.    Diagnosis: Type 2 diabetes mellitus  Assessment and Plan of Treatment: You have diabetes. Your HgA1c should be < 7. Continue taking your medication regimen as prescribed. Continue with a consistent carbohydrate diet, meaning eat the same amount of carbohydrates at the same time each day. Monitor your blood glucose levels throughout the day before meals and at bedtime. Record you blood sugars and bring the log to your outpatient doctor appointments in order to be reviewed for management modifications. If your blood sugars are more than 400 or less than 70, you should contact your primary care doctor immediately. Monitor for signs/symptoms of low blood glucose, such as: dizziness, altered mental status, or cool/clammy skin. In addition, monitor for signs/symptoms of high blood glucose, such as: feeling hot, dry, fatigued, or with increased thirst/urination. Make regular podiatry appointments in order to have your feet checked for wounds and uncontrolled toe nail growth to prevent infections, as well as appointments with an ophthalmologist to monitor your vision.

## 2024-09-17 NOTE — DISCHARGE NOTE PROVIDER - ATTENDING DISCHARGE PHYSICAL EXAMINATION:
PHYSICAL EXAM  Vital Signs Last 24 Hrs  T(C): 36.9 (18 Sep 2024 16:30), Max: 36.9 (18 Sep 2024 16:30)  T(F): 98.5 (18 Sep 2024 16:30), Max: 98.5 (18 Sep 2024 16:30)  HR: 52 (18 Sep 2024 15:30) (52 - 69)  BP: 123/68 (18 Sep 2024 16:30) (92/53 - 126/86)  BP(mean): 89 (18 Sep 2024 15:30) (71 - 102)  RR: 17 (18 Sep 2024 16:30) (17 - 18)  SpO2: 95% (18 Sep 2024 16:30) (91% - 98%)    Parameters below as of 18 Sep 2024 16:30  Patient On (Oxygen Delivery Method): room air        09-17-24 @ 07:01  -  09-18-24 @ 07:00  --------------------------------------------------------  IN: 0 mL / OUT: 500 mL / NET: -500 mL    09-18-24 @ 07:01  -  09-18-24 @ 16:29  --------------------------------------------------------  IN: 240 mL / OUT: 200 mL / NET: 40 mL      CONSTITUTIONAL: Well-groomed, in no apparent distress;  EYES: No conjunctival or scleral injection, non-icteric;  ENMT: No external nasal lesions; Normal outer ears;  NECK: Trachea midline;  RESPIRATORY: Normal respiratory effort;  CARDIOVASCULAR: Regular rate and rhythm;  GASTROINTESTINAL: Non-distended;   EXTREMITIES:  No lower extremity edema;  NEUROLOGY: A+O to person, place, and time; Does respond to commands appropriately;  PSYCHIATRY: Mood and Affect appropriate

## 2024-09-17 NOTE — DISCHARGE NOTE PROVIDER - NSDCFUADDINST_GEN_ALL_CORE_FT
Patient teaching done about DAPT  patient  is made aware to take  antiplatelet therapy as prescribed ( Statin and Aspirin and Plavix or Brilinta or Effient )- as they are needed to keep your stent/s OPEN  patient is aware to take them every day, do not miss or double up on any doses  these medications can only be discontinued by your cardiologist   TEACH BACK used to verify patient's understanding; pateint verbalizes understanding and gives positive feedback .  Wound Care:   the day AFTER your procedure remove bandage GENTLY, and clean using  mild soap and gentle warm, water stream, pat dry. leave OPEN to air. YOU MAY SHOWER   DO NOT apply lotions, creams, ointments, powder, perfumes to your incision site  DO NOT SOAK your site for 1 week ( no baths, no pools, no tubs, etc...)  Check  your groin and/or wrist daily. A small amount of bruising, and soreness are normal    ACTIVITY: for 24 hours   - DO NOT DRIVE  - DO NOT make any important decisions or sign legal documents   - DO NOT operate heavy machineries   - you may resume sexual activity in 48 hours, unless otherwise instructed by your cardiologist     If your procedure was done through the WRIST: for the NEXT 3 DAYS  - avoid pushing, pulling, with that affected wrist   - avoid repeated movement of that hand and wrist ( eg: typing, hammering)  - DO NOT LIFT anything more than 5 lbs     If your procedure was done through the GROIN: for the NEXT 5 DAYS  - Limit climbing stairs, DO NOT soak in bathtub or pool  - no strenuous activities, pushing, pulling, straining  - Do not lift anything 10lbs or heavier     MEDICATION:   take your medications as explained ( see discharge paperwork)   If you received a STENT, you will be taking antiplatelet medications to KEEP YOUR STENT OPEN ( eg: Aspirin, Plavix, Brilinta, Effient, etc).  Take as prescribed DO NOT STOP taking them without consulting with your cardiologist first.     Follow heart healthy diet recommended by your doctor, , if you smoke STOP SMOKING ( may call [phone] for center of tobacco control if you need assistance)     CALL your doctor to make appointment in 2 WEEKS     ***CALL YOUR DOCTOR***  if you experience: fever, chills, body aches, or severe pain, swelling, redness, heat or yellow discharge at incision site  If you experience Bleeding or excruciating pain at the procedural site, swelling (golf ball size) at your procedural site  If you experience CHEST PAIN  If you experience extremity numbness, tingling, temperature change (of your procedural site)   If you are unable to reach your doctor, you may contact:   -Cardiology Office at [facility] at [phone] or   - [facility] [phone]  - [facility] [phone]

## 2024-09-18 ENCOUNTER — TRANSCRIPTION ENCOUNTER (OUTPATIENT)
Age: 69
End: 2024-09-18

## 2024-09-18 VITALS
OXYGEN SATURATION: 95 % | DIASTOLIC BLOOD PRESSURE: 68 MMHG | TEMPERATURE: 98 F | SYSTOLIC BLOOD PRESSURE: 123 MMHG | RESPIRATION RATE: 17 BRPM

## 2024-09-18 LAB
ANION GAP SERPL CALC-SCNC: 12 MMOL/L — SIGNIFICANT CHANGE UP (ref 5–17)
BUN SERPL-MCNC: 14 MG/DL — SIGNIFICANT CHANGE UP (ref 7–23)
CALCIUM SERPL-MCNC: 9.4 MG/DL — SIGNIFICANT CHANGE UP (ref 8.4–10.5)
CHLORIDE SERPL-SCNC: 104 MMOL/L — SIGNIFICANT CHANGE UP (ref 96–108)
CO2 SERPL-SCNC: 22 MMOL/L — SIGNIFICANT CHANGE UP (ref 22–31)
CREAT SERPL-MCNC: 0.85 MG/DL — SIGNIFICANT CHANGE UP (ref 0.5–1.3)
EGFR: 95 ML/MIN/1.73M2 — SIGNIFICANT CHANGE UP
GLUCOSE BLDC GLUCOMTR-MCNC: 137 MG/DL — HIGH (ref 70–99)
GLUCOSE BLDC GLUCOMTR-MCNC: 77 MG/DL — SIGNIFICANT CHANGE UP (ref 70–99)
GLUCOSE BLDC GLUCOMTR-MCNC: 86 MG/DL — SIGNIFICANT CHANGE UP (ref 70–99)
GLUCOSE BLDC GLUCOMTR-MCNC: 89 MG/DL — SIGNIFICANT CHANGE UP (ref 70–99)
GLUCOSE BLDC GLUCOMTR-MCNC: 92 MG/DL — SIGNIFICANT CHANGE UP (ref 70–99)
GLUCOSE SERPL-MCNC: 83 MG/DL — SIGNIFICANT CHANGE UP (ref 70–99)
POTASSIUM SERPL-MCNC: 3.6 MMOL/L — SIGNIFICANT CHANGE UP (ref 3.5–5.3)
POTASSIUM SERPL-SCNC: 3.6 MMOL/L — SIGNIFICANT CHANGE UP (ref 3.5–5.3)
SODIUM SERPL-SCNC: 138 MMOL/L — SIGNIFICANT CHANGE UP (ref 135–145)
TROPONIN T, HIGH SENSITIVITY RESULT: 11 NG/L — SIGNIFICANT CHANGE UP (ref 0–51)

## 2024-09-18 PROCEDURE — 92920 PRQ TRLUML C ANGIOP 1ART&/BR: CPT | Mod: RC

## 2024-09-18 PROCEDURE — 99152 MOD SED SAME PHYS/QHP 5/>YRS: CPT

## 2024-09-18 PROCEDURE — 93010 ELECTROCARDIOGRAM REPORT: CPT

## 2024-09-18 PROCEDURE — 99239 HOSP IP/OBS DSCHRG MGMT >30: CPT

## 2024-09-18 PROCEDURE — 99233 SBSQ HOSP IP/OBS HIGH 50: CPT | Mod: 25,GC

## 2024-09-18 RX ORDER — SODIUM CHLORIDE 9 MG/ML
1000 INJECTION INTRAMUSCULAR; INTRAVENOUS; SUBCUTANEOUS
Refills: 0 | Status: DISCONTINUED | OUTPATIENT
Start: 2024-09-18 | End: 2024-09-18

## 2024-09-18 RX ORDER — SITAGLIPTIN AND METFORMIN HYDROCHLORIDE 500; 50 MG/1; MG/1
1 TABLET, FILM COATED ORAL
Qty: 0 | Refills: 0 | DISCHARGE

## 2024-09-18 RX ORDER — ROSUVASTATIN CALCIUM 10 MG/1
1 TABLET ORAL
Qty: 0 | Refills: 0 | DISCHARGE
Start: 2024-09-18

## 2024-09-18 RX ORDER — NITROGLYCERIN 0.2MG/HR
1 PATCH, TRANSDERMAL 24 HOURS TRANSDERMAL
Qty: 0 | Refills: 0 | DISCHARGE

## 2024-09-18 RX ORDER — SODIUM CHLORIDE 9 MG/ML
250 INJECTION INTRAMUSCULAR; INTRAVENOUS; SUBCUTANEOUS ONCE
Refills: 0 | Status: COMPLETED | OUTPATIENT
Start: 2024-09-18 | End: 2024-09-18

## 2024-09-18 RX ORDER — METFORMIN HYDROCHLORIDE 850 MG/1
1 TABLET, FILM COATED ORAL
Qty: 0 | Refills: 0 | DISCHARGE
Start: 2024-09-18

## 2024-09-18 RX ADMIN — SODIUM CHLORIDE 75 MILLILITER(S): 9 INJECTION INTRAMUSCULAR; INTRAVENOUS; SUBCUTANEOUS at 10:54

## 2024-09-18 RX ADMIN — SODIUM CHLORIDE 500 MILLILITER(S): 9 INJECTION INTRAMUSCULAR; INTRAVENOUS; SUBCUTANEOUS at 10:55

## 2024-09-18 RX ADMIN — SODIUM CHLORIDE 75 MILLILITER(S): 9 INJECTION INTRAMUSCULAR; INTRAVENOUS; SUBCUTANEOUS at 13:44

## 2024-09-18 RX ADMIN — LOSARTAN POTASSIUM 100 MILLIGRAM(S): 50 TABLET ORAL at 06:04

## 2024-09-18 RX ADMIN — RANOLAZINE 1000 MILLIGRAM(S): 500 TABLET, FILM COATED, EXTENDED RELEASE ORAL at 06:04

## 2024-09-18 RX ADMIN — Medication 75 MILLIGRAM(S): at 06:04

## 2024-09-18 RX ADMIN — Medication 81 MILLIGRAM(S): at 06:04

## 2024-09-18 RX ADMIN — RANOLAZINE 1000 MILLIGRAM(S): 500 TABLET, FILM COATED, EXTENDED RELEASE ORAL at 16:40

## 2024-09-18 RX ADMIN — METOPROLOL TARTRATE 50 MILLIGRAM(S): 100 TABLET ORAL at 06:04

## 2024-09-18 NOTE — DISCHARGE NOTE NURSING/CASE MANAGEMENT/SOCIAL WORK - PATIENT PORTAL LINK FT
You can access the FollowMyHealth Patient Portal offered by [facility] by registering at the following website: [email or web address]. By joining [employer]’s FollowMyHealth portal, you will also be able to view your health information using other applications (apps) compatible with our system.

## 2024-09-18 NOTE — PROGRESS NOTE ADULT - ATTENDING COMMENTS
68 year old man with multiple coronary stents, multiple episodes of re-stenosis presents with recent onset chest pain. Troponin negative at 9, but based on history arranged coronary angiography accomplished with stent to LAD distal to site of prior stent and plan return to lab today for staged intervention. Chest pain resolved after intervention yesterday.    To contact call Cardiology Fellow or Attending as listed on [email or web address] password: [email or web address].

## 2024-09-18 NOTE — PROGRESS NOTE ADULT - SUBJECTIVE AND OBJECTIVE BOX
Patient seen and examined at bedside.    Overnight Events: s/p PCI    REVIEW OF SYSTEMS:  CONSTITUTIONAL: No weakness, fevers or chills  EYES/ENT: No visual changes;  No dysphagia  NECK: No pain or stiffness  RESPIRATORY: No cough, wheezing, hemoptysis; No shortness of breath  CARDIOVASCULAR: No chest pain or palpitations; No lower extremity edema  GASTROINTESTINAL: No abdominal or epigastric pain. No nausea, vomiting, or hematemesis; No diarrhea or constipation. No melena or hematochezia.  BACK: No back pain  GENITOURINARY: No dysuria, frequency or hematuria  NEUROLOGICAL: No numbness or weakness  SKIN: No itching, burning, rashes, or lesions   All other review of systems is negative unless indicated above.            Current Meds:  aspirin enteric coated 81 milliGRAM(s) Oral daily  clopidogrel Tablet 75 milliGRAM(s) Oral daily  dextrose 5%. 1000 milliLiter(s) IV Continuous <Continuous>  dextrose 50% Injectable 25 Gram(s) IV Push once  glucagon  Injectable 1 milliGRAM(s) IntraMuscular once  insulin lispro (ADMELOG) corrective regimen sliding scale   SubCutaneous three times a day before meals  insulin lispro (ADMELOG) corrective regimen sliding scale   SubCutaneous at bedtime  losartan 100 milliGRAM(s) Oral daily  metoprolol succinate ER 50 milliGRAM(s) Oral daily  multivitamin 1 Tablet(s) Oral daily  nitroglycerin     SubLingual 0.4 milliGRAM(s) SubLingual once PRN  nitroglycerin     SubLingual 0.4 milliGRAM(s) SubLingual every 5 minutes  ranolazine 1000 milliGRAM(s) Oral two times a day  rosuvastatin 40 milliGRAM(s) Oral at bedtime  sodium chloride 0.9%. 1000 milliLiter(s) IV Continuous <Continuous>  tamsulosin 0.4 milliGRAM(s) Oral at bedtime      Vitals:  T(F): 98.1 (-), Max: 98.1 (-)  HR: 69 (-) (49 - 69)  BP: 119/59 (-) (110/59 - 151/83)  RR: 17 (-)  SpO2: 96% (-)  I&O's Summary    17 Sep 2024 07:01  -  18 Sep 2024 07:00  --------------------------------------------------------  IN: 0 mL / OUT: 500 mL / NET: -500 mL        Physical Exam:  GEN: NAD  HEENT: EOMI, clear sclera  PULM: CTA b/l, no wheeze  CV: RRR S1 S2, no murmur, no JVD  ABD: S, NT, ND  EXT: WWP, no edema  PSYCH: normal affect  SKIN: No rash                          12.3   4.50  )-----------( 152      ( 17 Sep 2024 07:23 )             38.6     09-18    138  |  104  |  14  ----------------------------<  83  3.6   |  22  |  0.85    Ca    9.4      18 Sep 2024 01:24    TPro  6.9  /  Alb  4.1  /  TBili  0.6  /  DBili  x   /  AST  14  /  ALT  14  /  AlkPhos  60  09-17    PT/INR - ( 16 Sep 2024 12:48 )   PT: 10.8 sec;   INR: 1.03 ratio         PTT - ( 17 Sep 2024 07:23 )  PTT:47.4 sec        Total Cholesterol: 96  LDL: --  HDL: 38  T  
Patient seen and examined at bedside.    Overnight Events: NAEON    REVIEW OF SYSTEMS:  CONSTITUTIONAL: No weakness, fevers or chills  EYES/ENT: No visual changes;  No dysphagia  NECK: No pain or stiffness  RESPIRATORY: No cough, wheezing, hemoptysis; No shortness of breath  CARDIOVASCULAR: No chest pain or palpitations; No lower extremity edema  GASTROINTESTINAL: No abdominal or epigastric pain. No nausea, vomiting, or hematemesis; No diarrhea or constipation. No melena or hematochezia.  BACK: No back pain  GENITOURINARY: No dysuria, frequency or hematuria  NEUROLOGICAL: No numbness or weakness  SKIN: No itching, burning, rashes, or lesions   All other review of systems is negative unless indicated above.            Current Meds:  aspirin enteric coated 81 milliGRAM(s) Oral daily  clopidogrel Tablet 75 milliGRAM(s) Oral daily  dextrose 5%. 1000 milliLiter(s) IV Continuous <Continuous>  dextrose 50% Injectable 25 Gram(s) IV Push once  glucagon  Injectable 1 milliGRAM(s) IntraMuscular once  heparin  Infusion.  Unit(s)/Hr IV Continuous <Continuous>  insulin lispro (ADMELOG) corrective regimen sliding scale   SubCutaneous three times a day before meals  insulin lispro (ADMELOG) corrective regimen sliding scale   SubCutaneous at bedtime  metoprolol succinate ER 50 milliGRAM(s) Oral daily  multivitamin 1 Tablet(s) Oral daily  nitroglycerin     SubLingual 0.4 milliGRAM(s) SubLingual every 5 minutes  nitroglycerin     SubLingual 0.4 milliGRAM(s) SubLingual once PRN  ranolazine 1000 milliGRAM(s) Oral two times a day  rosuvastatin 40 milliGRAM(s) Oral at bedtime  tamsulosin 0.4 milliGRAM(s) Oral at bedtime      Vitals:  T(F): 97.9 (09-17), Max: 98.2 (09-16)  HR: 61 (09-17) (54 - 68)  BP: 109/71 (09-17) (109/71 - 154/64)  RR: 18 (09-17)  SpO2: 97% (09-17)  I&O's Summary      Physical Exam:  GEN: NAD  HEENT: EOMI, clear sclera  PULM: CTA b/l, no wheeze  CV: RRR S1 S2, no murmur, no JVD  ABD: S, NT, ND  EXT: WWP, no edema  PSYCH: normal affect  SKIN: No rash                          12.0   4.44  )-----------( 145      ( 17 Sep 2024 03:40 )             37.3     09-16    146<H>  |  108  |  15  ----------------------------<  88  4.5   |  25  |  0.80    Ca    9.8      16 Sep 2024 12:48    TPro  7.2  /  Alb  4.3  /  TBili  0.5  /  DBili  x   /  AST  18  /  ALT  13  /  AlkPhos  63  09-16    PT/INR - ( 16 Sep 2024 12:48 )   PT: 10.8 sec;   INR: 1.03 ratio         PTT - ( 17 Sep 2024 03:40 )  PTT:63.4 sec    
SUBJECTIVE / OVERNIGHT EVENTS:  Today is hospital day 1d. There are no new issues or overnight events.   Did not endorse any lightheadedness, vertigo, shortness of breathe, chest pain, palpitations, vomiting, diarrhea currently    HPI:  The patient is a 68M with h/o former cigarette smoker x 90PY,, CAD with multiple stents, DM2, HTN, HLD, GERD, gastric bypass surgery 5 years ago,CHRISTIANE use CPAP presenting with L chest pain, pressure like 5/10, constant , radiates to L arm associated with SOB for last 4 days. He also has been feeling dizziness on and off for the same period. In Dec 2023 he had last PCI. Per conversation he has extensive F/H/O CAD, has been compliant on meds. Denies, fever, recent travel, swelling of legs.  (16 Sep 2024 15:58)    MEDICATIONS  (STANDING):  aspirin enteric coated 81 milliGRAM(s) Oral daily  clopidogrel Tablet 75 milliGRAM(s) Oral daily  dextrose 5%. 1000 milliLiter(s) (100 mL/Hr) IV Continuous <Continuous>  dextrose 50% Injectable 25 Gram(s) IV Push once  glucagon  Injectable 1 milliGRAM(s) IntraMuscular once  insulin lispro (ADMELOG) corrective regimen sliding scale   SubCutaneous three times a day before meals  insulin lispro (ADMELOG) corrective regimen sliding scale   SubCutaneous at bedtime  metoprolol succinate ER 50 milliGRAM(s) Oral daily  multivitamin 1 Tablet(s) Oral daily  nitroglycerin     SubLingual 0.4 milliGRAM(s) SubLingual every 5 minutes  ranolazine 1000 milliGRAM(s) Oral two times a day  rosuvastatin 40 milliGRAM(s) Oral at bedtime  sodium chloride 0.9%. 1000 milliLiter(s) (100 mL/Hr) IV Continuous <Continuous>  tamsulosin 0.4 milliGRAM(s) Oral at bedtime    MEDICATIONS  (PRN):  nitroglycerin     SubLingual 0.4 milliGRAM(s) SubLingual once PRN Chest Pain    HOME MEDICATIONS:  Aspirin Enteric Coated 81 mg oral delayed release tablet: 1 tab(s) orally once a day  clopidogrel 75 mg oral tablet: 1 tab(s) orally once a day  Crystal B-12 1000 mcg/mL injectable solution: 1,000 microgram(s) intramuscularly once a month  docusate sodium 100 mg oral capsule: 1 cap(s) orally once a day  Flomax 0.4 mg oral capsule: 1 cap(s) orally once a day  gabapentin 300 mg oral capsule: 1 cap(s) orally 2 times a day  gemfibrozil 600 mg oral tablet: 1 tab(s) orally once a day  Janumet 50 mg-500 mg oral tablet: 1 tab(s) orally once a day  linaclotide 145 mcg oral capsule: 1 cap(s) orally once a day  metFORMIN 500 mg oral tablet: 1 tab(s) orally 2 times a day Hold 48 hrs, Restart 12/7/23  metoprolol succinate 50 mg oral tablet, extended release: 1 tab(s) orally once a day  Multiple Vitamins oral tablet: 1 tab(s) orally once a day  nitroglycerin 0.4 mg sublingual tablet: 1 tab(s) sublingually every 5 minutes as needed, max 3 tabs  olmesartan 40 mg oral tablet: 1 tab(s) orally once a day  omeprazole 40 mg oral delayed release capsule: 1 cap(s) orally once a day  Ozempic 2 mg/1.5 mL (0.25 mg or 0.5 mg dose) subcutaneous solution: 0.5 milligram(s) subcutaneously once a week  Ranexa 1000 mg oral tablet, extended release: 1 tab(s) orally 2 times a day [pt reports often forgets to take 2nd dose for the day]  rosuvastatin 40 mg oral tablet: 1 tab(s) orally once a day  Zenpep 40,000 units-126,000 units-168,000 units oral delayed release capsule: 1 cap(s) orally 2 times a day    PHYSICAL EXAM  Vital Signs Last 24 Hrs  T(C): 36.6 (17 Sep 2024 09:50), Max: 36.8 (16 Sep 2024 22:08)  T(F): 97.8 (17 Sep 2024 09:50), Max: 98.2 (16 Sep 2024 22:08)  HR: 52 (17 Sep 2024 15:00) (49 - 61)  BP: 134/74 (17 Sep 2024 15:00) (109/71 - 154/64)  BP(mean): 99 (17 Sep 2024 15:00) (78 - 99)  RR: 17 (17 Sep 2024 15:00) (8 - 18)  SpO2: 97% (17 Sep 2024 15:00) (94% - 98%)    Parameters below as of 17 Sep 2024 15:00  Patient On (Oxygen Delivery Method): room air        09-17-24 @ 07:01  -  09-17-24 @ 15:14  --------------------------------------------------------  IN: 0 mL / OUT: 500 mL / NET: -500 mL      CONSTITUTIONAL: Well-groomed, in no apparent distress;  EYES: No conjunctival or scleral injection, non-icteric;  ENMT: No external nasal lesions; Normal outer ears;  NECK: Trachea midline;  RESPIRATORY: Normal respiratory effort;   CARDIOVASCULAR: Regular rate and rhythm;  GASTROINTESTINAL: Non-distended;   EXTREMITIES:  No lower extremity edema;  NEUROLOGY: A+O to person, place, and time; Does respond to commands appropriately;  PSYCHIATRY: Mood and Affect appropriate    LABS:                        12.3   4.50  )-----------( 152      ( 17 Sep 2024 07:23 )             38.6     09-17    139  |  104  |  15  ----------------------------<  90  3.8   |  22  |  0.70    Ca    9.6      17 Sep 2024 07:23    TPro  6.9  /  Alb  4.1  /  TBili  0.6  /  DBili  x   /  AST  14  /  ALT  14  /  AlkPhos  60  09-17    PT/INR - ( 16 Sep 2024 12:48 )   PT: 10.8 sec;   INR: 1.03 ratio         PTT - ( 17 Sep 2024 07:23 )  PTT:47.4 sec      Urinalysis Basic - ( 17 Sep 2024 07:23 )    Color: x / Appearance: x / SG: x / pH: x  Gluc: 90 mg/dL / Ketone: x  / Bili: x / Urobili: x   Blood: x / Protein: x / Nitrite: x   Leuk Esterase: x / RBC: x / WBC x   Sq Epi: x / Non Sq Epi: x / Bacteria: x            RADIOLOGY & ADDITIONAL TESTS:  EKG  12 Lead ECG:   Ventricular Rate 57 BPM    Atrial Rate 57 BPM    P-R Interval 188 ms    QRS Duration 102 ms    Q-T Interval 448 ms    QTC Calculation(Bazett) 436 ms    P Axis 50 degrees    R Axis 6 degrees    T Axis 203 degrees    Diagnosis Line SINUS BRADYCARDIA  CANNOT RULE OUT INFERIOR INFARCT , AGE UNDETERMINED  ABNORMAL ECG  WHEN COMPARED WITH ECG OF 16-SEP-2024 10:55, (UNCONFIRMED)  SIGNIFICANT CHANGES HAVE OCCURRED  Confirmed by Narcisa Thompson ([id number]) on 9/17/2024 12:51:55 PM (09-17-24 @ 08:20)  12 Lead ECG:   Ventricular Rate 68 BPM    Atrial Rate 68 BPM    P-R Interval 186 ms    QRS Duration 104 ms    Q-T Interval 420 ms    QTC Calculation(Bazett) 446 ms    P Axis 55 degrees    R Axis 23 degrees    T Axis 233 degrees    Diagnosis Line NORMAL SINUS RHYTHM  T WAVE ABNORMALITY, CONSIDER INFERIOR ISCHEMIA  T WAVE ABNORMALITY, CONSIDER ANTEROLATERAL ISCHEMIA  ABNORMAL ECG  WHEN COMPARED WITH ECG OF 04-DEC-2023 07:55,  NO SIGNIFICANT CHANGE WAS FOUND  Confirmed by ARNOLD REESE MD ([id number]) on 12/5/2023 8:41:04 PM (12-04-23 @ 10:50)    Xray Chest 1 View- PORTABLE-Routine:   ACC: [id number] EXAM:  XR CHEST PORTABLE ROUTINE 1V   ORDERED BY: EARL LAGOS     PROCEDURE DATE:  09/16/2024          INTERPRETATION:  EXAMINATION: XR CHEST    CLINICAL INDICATION: chest pain    TECHNIQUE: Single frontal portable view of the chest from 9/16/2024 1:44   PM    COMPARISON: Chest x-ray from 5/12/2021.    FINDINGS:  The heart size is normal.  The lungs are clear.  There is no pneumothorax or pleural effusion.    IMPRESSION:  Clear lungs.    --- End of Report ---           PK NGO MD; Resident Radiologist  This document has been electronically signed.  ARTI LOGAN MD; Attending Radiologist  This document has been electronically signed. Sep 16 2024  9:51PM (09-16-24 @ 13:44)

## 2024-09-18 NOTE — PROGRESS NOTE ADULT - ASSESSMENT
68M w/ PMH of multiple MIs s/p PCI (HOMA to LAD, rPL, RCA, D1), HLD presenting with typical chest pain, first troponin negative. EKG similar to prior. However, given significant cardiac history and ongoing chest pain, would treat this as unstable angina/ ACS.     Recs:  -c/w ASA, plavix, heparin gtt  -c/w rosuva 40, check lipid panel, A1C  -c/w ranexa, SL nitro PRN  -c/w toprol 50  -plan for [facility] today  
68M w/ PMH of multiple MIs s/p PCI (HOMA to LAD, rPL, RCA, D1), HLD presenting with typical chest pain, with negative troponins concerning for unstable angina. LHC w/ severe stenosis in distal LAD s/p PCI, planned for staged intervention today.    Recs:  -c/w ASA, plavix  -c/w rosuva 40, check lipid panel, A1C  -c/w losartan 100mg, toprol 50mg  -c/w ranexa, SL nitro PRN  -plan for staged PCI today
The patient is a 68M with h/o former cigarette smoker x 90PY,, CAD with multiple stents, DM2, HTN, HLD, GERD, gastric bypass surgery 5 years ago,CHRISTIANE use CPAP presenting with L chest pain, pressure like 5/10, constant , radiates to L arm associated with SOB for last 4 days. He also has been feeling dizziness on and off for the same period. In Dec 2023 he had last PCI. Per conversation he has extensive F/H/O CAD, has been compliant on meds. Denies, fever, recent travel, swelling of legs.  In ED he remains afebrile, VSS, EKG- Old T inv in 1, AVL, V5-6, Q in lead 2, Trop wnl. Started on IV heparin gtt, Cardiology evaluated.

## 2024-09-29 PROCEDURE — 85027 COMPLETE CBC AUTOMATED: CPT

## 2024-09-29 PROCEDURE — 93005 ELECTROCARDIOGRAM TRACING: CPT

## 2024-09-29 PROCEDURE — 93454 CORONARY ARTERY ANGIO S&I: CPT | Mod: 59

## 2024-09-29 PROCEDURE — 36415 COLL VENOUS BLD VENIPUNCTURE: CPT

## 2024-09-29 PROCEDURE — C1874: CPT

## 2024-09-29 PROCEDURE — C1889: CPT

## 2024-09-29 PROCEDURE — C1887: CPT

## 2024-09-29 PROCEDURE — 80048 BASIC METABOLIC PNL TOTAL CA: CPT

## 2024-09-29 PROCEDURE — 82962 GLUCOSE BLOOD TEST: CPT

## 2024-09-29 PROCEDURE — 96374 THER/PROPH/DIAG INJ IV PUSH: CPT

## 2024-09-29 PROCEDURE — 80061 LIPID PANEL: CPT

## 2024-09-29 PROCEDURE — C1725: CPT

## 2024-09-29 PROCEDURE — 99285 EMERGENCY DEPT VISIT HI MDM: CPT

## 2024-09-29 PROCEDURE — 80053 COMPREHEN METABOLIC PANEL: CPT

## 2024-09-29 PROCEDURE — 85730 THROMBOPLASTIN TIME PARTIAL: CPT

## 2024-09-29 PROCEDURE — 83880 ASSAY OF NATRIURETIC PEPTIDE: CPT

## 2024-09-29 PROCEDURE — C1769: CPT

## 2024-09-29 PROCEDURE — 84484 ASSAY OF TROPONIN QUANT: CPT

## 2024-09-29 PROCEDURE — 92920 PRQ TRLUML C ANGIOP 1ART&/BR: CPT | Mod: RC

## 2024-09-29 PROCEDURE — 85610 PROTHROMBIN TIME: CPT

## 2024-09-29 PROCEDURE — C9600: CPT | Mod: LD

## 2024-09-29 PROCEDURE — 71045 X-RAY EXAM CHEST 1 VIEW: CPT

## 2024-09-29 PROCEDURE — 85025 COMPLETE CBC W/AUTO DIFF WBC: CPT

## 2024-09-29 PROCEDURE — 83036 HEMOGLOBIN GLYCOSYLATED A1C: CPT

## 2024-09-29 PROCEDURE — C1894: CPT

## 2024-10-24 NOTE — ED ADULT NURSE NOTE - NSFALLRSKINDICATORS_ED_ALL_ED
"Subjective   Griffin Rolon a 7 y.o.malewho presents forEarache (7 yr old here with parents-  has been holding his ears for the past few days, complaining that it hurts, when he talks he sounds loud)  HPI    Holding his ear and says it is loud when he talks and he can hear his voice. No pain, slight cough- zarbees. Intermittent fevers but none in 2 weeks.     Objective   /64   Pulse 99   Temp 36.7 °C (98 °F) (Axillary)   Ht 1.187 m (3' 10.75\")   Wt 20 kg Comment: 44.2lb  SpO2 94%   BMI 14.22 kg/m²       Physical Exam      General: Well-developed, well-nourished, alert and oriented, no acute distress  Eyes: Normal sclera, PERRLA, EOMI  ENT: The right TM is purulent and bulging with inflammation. The left TM is normal. Throat is mildly red but not beefy no exudate, there is some nasal congestion.  Cardiac: Regular rate and rhythm, normal S1/S2, no murmurs.  Pulmonary: Clear to auscultation bilaterally, no work of breathing.  GI: Soft nondistended nontender abdomen without rebound or guarding.  Skin: No rashes  Neuro: Symmetric face, no ataxia, grossly normal strength.  Lymph: No lymphadenopathy       No visits with results within 10 Day(s) from this visit.   Latest known visit with results is:   No results found for any previous visit.         Assessment/Plan   Diagnoses and all orders for this visit:  Non-recurrent acute suppurative otitis media of right ear without spontaneous rupture of tympanic membrane  -     amoxicillin (Amoxil) 400 mg/5 mL suspension; Take 6 mL (480 mg) by mouth 2 times a day for 10 days.    "
no

## 2024-11-26 NOTE — ED PROVIDER NOTE - EKG #1 DATE/TIME
Spironolactone Pregnancy And Lactation Text: This medication can cause feminization of the male fetus and should be avoided during pregnancy. The active metabolite is also found in breast milk. Benzoyl Peroxide Counseling: Patient counseled that medicine may cause skin irritation and bleach clothing.  In the event of skin irritation, the patient was advised to reduce the amount of the drug applied or use it less frequently.   The patient verbalized understanding of the proper use and possible adverse effects of benzoyl peroxide.  All of the patient's questions and concerns were addressed. Topical Clindamycin Pregnancy And Lactation Text: This medication is Pregnancy Category B and is considered safe during pregnancy. It is unknown if it is excreted in breast milk. High Dose Vitamin A Counseling: Side effects reviewed, pt to contact office should one occur. Topical Sulfur Applications Pregnancy And Lactation Text: This medication is Pregnancy Category C and has an unknown safety profile during pregnancy. It is unknown if this topical medication is excreted in breast milk. Dapsone Pregnancy And Lactation Text: This medication is Pregnancy Category C and is not considered safe during pregnancy or breast feeding. Azithromycin Counseling:  I discussed with the patient the risks of azithromycin including but not limited to GI upset, allergic reaction, drug rash, diarrhea, and yeast infections. Topical Retinoid counseling:  Patient advised to apply a pea-sized amount only at bedtime and wait 30 minutes after washing their face before applying.  If too drying, patient may add a non-comedogenic moisturizer. The patient verbalized understanding of the proper use and possible adverse effects of retinoids.  All of the patient's questions and concerns were addressed. Minocycline Counseling: Patient advised regarding possible photosensitivity and discoloration of the teeth, skin, lips, tongue and gums.  Patient instructed to avoid sunlight, if possible.  When exposed to sunlight, patients should wear protective clothing, sunglasses, and sunscreen.  The patient was instructed to call the office immediately if the following severe adverse effects occur:  hearing changes, easy bruising/bleeding, severe headache, or vision changes.  The patient verbalized understanding of the proper use and possible adverse effects of minocycline.  All of the patient's questions and concerns were addressed. Tetracycline Pregnancy And Lactation Text: This medication is Pregnancy Category D and not consider safe during pregnancy. It is also excreted in breast milk. Doxycycline Pregnancy And Lactation Text: This medication is Pregnancy Category D and not consider safe during pregnancy. It is also excreted in breast milk but is considered safe for shorter treatment courses. Erythromycin Counseling:  I discussed with the patient the risks of erythromycin including but not limited to GI upset, allergic reaction, drug rash, diarrhea, increase in liver enzymes, and yeast infections. Aklief counseling:  Patient advised to apply a pea-sized amount only at bedtime and wait 30 minutes after washing their face before applying.  If too drying, patient may add a non-comedogenic moisturizer.  The most commonly reported side effects including irritation, redness, scaling, dryness, stinging, burning, itching, and increased risk of sunburn.  The patient verbalized understanding of the proper use and possible adverse effects of retinoids.  All of the patient's questions and concerns were addressed. Include Pregnancy/Lactation Warning?: No 26-Nov-2018 10:40 Tazorac Pregnancy And Lactation Text: This medication is not safe during pregnancy. It is unknown if this medication is excreted in breast milk. Bactrim Pregnancy And Lactation Text: This medication is Pregnancy Category D and is known to cause fetal risk.  It is also excreted in breast milk. Azelaic Acid Counseling: Patient counseled that medicine may cause skin irritation and to avoid applying near the eyes.  In the event of skin irritation, the patient was advised to reduce the amount of the drug applied or use it less frequently.   The patient verbalized understanding of the proper use and possible adverse effects of azelaic acid.  All of the patient's questions and concerns were addressed. Isotretinoin Counseling: Patient should get monthly blood tests, not donate blood, not drive at night if vision affected, not share medication, and not undergo elective surgery for 6 months after tx completed. Side effects reviewed, pt to contact office should one occur. Birth Control Pills Pregnancy And Lactation Text: This medication should be avoided if pregnant and for the first 30 days post-partum. Dapsone Counseling: I discussed with the patient the risks of dapsone including but not limited to hemolytic anemia, agranulocytosis, rashes, methemoglobinemia, kidney failure, peripheral neuropathy, headaches, GI upset, and liver toxicity.  Patients who start dapsone require monitoring including baseline LFTs and weekly CBCs for the first month, then every month thereafter.  The patient verbalized understanding of the proper use and possible adverse effects of dapsone.  All of the patient's questions and concerns were addressed. Isotretinoin Pregnancy And Lactation Text: This medication is Pregnancy Category X and is considered extremely dangerous during pregnancy. It is unknown if it is excreted in breast milk. Topical Sulfur Applications Counseling: Topical Sulfur Counseling: Patient counseled that this medication may cause skin irritation or allergic reactions.  In the event of skin irritation, the patient was advised to reduce the amount of the drug applied or use it less frequently.   The patient verbalized understanding of the proper use and possible adverse effects of topical sulfur application.  All of the patient's questions and concerns were addressed. Detail Level: Detailed High Dose Vitamin A Pregnancy And Lactation Text: High dose vitamin A therapy is contraindicated during pregnancy and breast feeding. Tetracycline Counseling: Patient counseled regarding possible photosensitivity and increased risk for sunburn.  Patient instructed to avoid sunlight, if possible.  When exposed to sunlight, patients should wear protective clothing, sunglasses, and sunscreen.  The patient was instructed to call the office immediately if the following severe adverse effects occur:  hearing changes, easy bruising/bleeding, severe headache, or vision changes.  The patient verbalized understanding of the proper use and possible adverse effects of tetracycline.  All of the patient's questions and concerns were addressed. Patient understands to avoid pregnancy while on therapy due to potential birth defects. Benzoyl Peroxide Pregnancy And Lactation Text: This medication is Pregnancy Category C. It is unknown if benzoyl peroxide is excreted in breast milk. Winlevi Counseling:  I discussed with the patient the risks of topical clascoterone including but not limited to erythema, scaling, itching, and stinging. Patient voiced their understanding. Azithromycin Pregnancy And Lactation Text: This medication is considered safe during pregnancy and is also secreted in breast milk. Doxycycline Counseling:  Patient counseled regarding possible photosensitivity and increased risk for sunburn.  Patient instructed to avoid sunlight, if possible.  When exposed to sunlight, patients should wear protective clothing, sunglasses, and sunscreen.  The patient was instructed to call the office immediately if the following severe adverse effects occur:  hearing changes, easy bruising/bleeding, severe headache, or vision changes.  The patient verbalized understanding of the proper use and possible adverse effects of doxycycline.  All of the patient's questions and concerns were addressed. Topical Retinoid Pregnancy And Lactation Text: This medication is Pregnancy Category C. It is unknown if this medication is excreted in breast milk. Aklief Pregnancy And Lactation Text: It is unknown if this medication is safe to use during pregnancy.  It is unknown if this medication is excreted in breast milk.  Breastfeeding women should use the topical cream on the smallest area of the skin for the shortest time needed while breastfeeding.  Do not apply to nipple and areola. Bactrim Counseling:  I discussed with the patient the risks of sulfa antibiotics including but not limited to GI upset, allergic reaction, drug rash, diarrhea, dizziness, photosensitivity, and yeast infections.  Rarely, more serious reactions can occur including but not limited to aplastic anemia, agranulocytosis, methemoglobinemia, blood dyscrasias, liver or kidney failure, lung infiltrates or desquamative/blistering drug rashes. Winlevi Pregnancy And Lactation Text: This medication is considered safe during pregnancy and breastfeeding. Tazorac Counseling:  Patient advised that medication is irritating and drying.  Patient may need to apply sparingly and wash off after an hour before eventually leaving it on overnight.  The patient verbalized understanding of the proper use and possible adverse effects of tazorac.  All of the patient's questions and concerns were addressed. Sarecycline Counseling: Patient advised regarding possible photosensitivity and discoloration of the teeth, skin, lips, tongue and gums.  Patient instructed to avoid sunlight, if possible.  When exposed to sunlight, patients should wear protective clothing, sunglasses, and sunscreen.  The patient was instructed to call the office immediately if the following severe adverse effects occur:  hearing changes, easy bruising/bleeding, severe headache, or vision changes.  The patient verbalized understanding of the proper use and possible adverse effects of sarecycline.  All of the patient's questions and concerns were addressed. Birth Control Pills Counseling: Birth Control Pill Counseling: I discussed with the patient the potential side effects of OCPs including but not limited to increased risk of stroke, heart attack, thrombophlebitis, deep venous thrombosis, hepatic adenomas, breast changes, GI upset, headaches, and depression.  The patient verbalized understanding of the proper use and possible adverse effects of OCPs. All of the patient's questions and concerns were addressed. Erythromycin Pregnancy And Lactation Text: This medication is Pregnancy Category B and is considered safe during pregnancy. It is also excreted in breast milk. Topical Clindamycin Counseling: Patient counseled that this medication may cause skin irritation or allergic reactions.  In the event of skin irritation, the patient was advised to reduce the amount of the drug applied or use it less frequently.   The patient verbalized understanding of the proper use and possible adverse effects of clindamycin.  All of the patient's questions and concerns were addressed. Azelaic Acid Pregnancy And Lactation Text: This medication is considered safe during pregnancy and breast feeding. Spironolactone Counseling: Patient advised regarding risks of diarrhea, abdominal pain, hyperkalemia, birth defects (for female patients), liver toxicity and renal toxicity. The patient may need blood work to monitor liver and kidney function and potassium levels while on therapy. The patient verbalized understanding of the proper use and possible adverse effects of spironolactone.  All of the patient's questions and concerns were addressed.

## 2024-12-17 NOTE — H&P ADULT - PROBLEM/PLAN-1
Plan: Recommend SPF 30+ broad spectrum, reapply every 2 hours or after sweating/swimming Detail Level: Zone DISPLAY PLAN FREE TEXT

## 2024-12-27 NOTE — H&P CARDIOLOGY - NSSUBSTANCEUSE_GEN_ALL_CORE_SD
Problem: Potential for Falls  Goal: Patient will remain free of falls  Description: INTERVENTIONS:  - Educate patient/family on patient safety including physical limitations  - Instruct patient to call for assistance with activity   - Consult OT/PT to assist with strengthening/mobility   - Keep Call bell within reach  - Keep bed low and locked with side rails adjusted as appropriate  - Keep care items and personal belongings within reach  - Initiate and maintain comfort rounds  - Make Fall Risk Sign visible to staff  - Offer Toileting every x Hours, in advance of need  - Initiate/Maintain xalarm  - Obtain necessary fall risk management equipment: x  - Apply yellow socks and bracelet for high fall risk patients  - Consider moving patient to room near nurses station  Outcome: Progressing     Problem: Knowledge Deficit  Goal: Patient/family/caregiver demonstrates understanding of disease process, treatment plan, medications, and discharge instructions  Description: Complete learning assessment and assess knowledge base.  Interventions:  - Provide teaching at level of understanding  - Provide teaching via preferred learning methods  Outcome: Progressing     
never used

## 2025-01-21 NOTE — ED ADULT TRIAGE NOTE - NS ED TRIAGE EKG
Medication Question or Refill        What medication are you calling about (include dose and sig)?: metoclopramide (REGLAN) 5 mg    Preferred Pharmacy:     [id number] [address] AT [address] [address]  Phone: [phone] Fax: [phone]    Controlled Substance Agreement on file:   CSA -- Patient Level:    CSA: None found at the patient level.       Who prescribed the medication?: Dr. Emi Gould    Do you need a refill? Yes    When did you use the medication last? today    Patient offered an appointment? No    Do you have any questions or concerns?  Yes: pt would like an additional bottle to have one at home and one at school.      Could we send this information to you in [facility] [place] or would you prefer to receive a phone call?:   Patient would prefer a phone call   Okay to leave a detailed message?: Yes at Cell number on file:    Telephone Information:   Mobile [phone]        EKG completed

## 2025-02-10 ENCOUNTER — TRANSCRIPTION ENCOUNTER (OUTPATIENT)
Age: 70
End: 2025-02-10

## 2025-02-10 ENCOUNTER — INPATIENT (INPATIENT)
Facility: HOSPITAL | Age: 70
LOS: 0 days | Discharge: HOME | DRG: 313 | End: 2025-02-10
Attending: INTERNAL MEDICINE | Admitting: INTERNAL MEDICINE
Payer: MEDICARE

## 2025-02-10 VITALS
DIASTOLIC BLOOD PRESSURE: 64 MMHG | SYSTOLIC BLOOD PRESSURE: 129 MMHG | HEART RATE: 75 BPM | OXYGEN SATURATION: 96 % | TEMPERATURE: 98 F | RESPIRATION RATE: 17 BRPM

## 2025-02-10 VITALS
DIASTOLIC BLOOD PRESSURE: 78 MMHG | HEART RATE: 58 BPM | TEMPERATURE: 98 F | OXYGEN SATURATION: 98 % | HEIGHT: 73 IN | SYSTOLIC BLOOD PRESSURE: 149 MMHG | RESPIRATION RATE: 18 BRPM | WEIGHT: 195.11 LBS

## 2025-02-10 DIAGNOSIS — Z95.5 PRESENCE OF CORONARY ANGIOPLASTY IMPLANT AND GRAFT: Chronic | ICD-10-CM

## 2025-02-10 DIAGNOSIS — Z98.84 BARIATRIC SURGERY STATUS: Chronic | ICD-10-CM

## 2025-02-10 DIAGNOSIS — R07.9 CHEST PAIN, UNSPECIFIED: ICD-10-CM

## 2025-02-10 LAB
ALBUMIN SERPL ELPH-MCNC: 4.6 G/DL — SIGNIFICANT CHANGE UP (ref 3.3–5)
ALP SERPL-CCNC: 70 U/L — SIGNIFICANT CHANGE UP (ref 40–120)
ALT FLD-CCNC: 22 U/L — SIGNIFICANT CHANGE UP (ref 10–45)
ANION GAP SERPL CALC-SCNC: 16 MMOL/L — SIGNIFICANT CHANGE UP (ref 5–17)
APTT BLD: 33.2 SEC — SIGNIFICANT CHANGE UP (ref 24.5–35.6)
AST SERPL-CCNC: 29 U/L — SIGNIFICANT CHANGE UP (ref 10–40)
BASOPHILS # BLD AUTO: 0.05 K/UL — SIGNIFICANT CHANGE UP (ref 0–0.2)
BASOPHILS NFR BLD AUTO: 1.1 % — SIGNIFICANT CHANGE UP (ref 0–2)
BILIRUB SERPL-MCNC: 0.5 MG/DL — SIGNIFICANT CHANGE UP (ref 0.2–1.2)
BUN SERPL-MCNC: 11 MG/DL — SIGNIFICANT CHANGE UP (ref 7–23)
CALCIUM SERPL-MCNC: 9.7 MG/DL — SIGNIFICANT CHANGE UP (ref 8.4–10.5)
CHLORIDE SERPL-SCNC: 104 MMOL/L — SIGNIFICANT CHANGE UP (ref 96–108)
CK MB BLD-MCNC: 2.5 % — SIGNIFICANT CHANGE UP (ref 0–3.5)
CK MB CFR SERPL CALC: 2.6 NG/ML — SIGNIFICANT CHANGE UP (ref 0–6.7)
CK SERPL-CCNC: 102 U/L — SIGNIFICANT CHANGE UP (ref 30–200)
CO2 SERPL-SCNC: 21 MMOL/L — LOW (ref 22–31)
CREAT SERPL-MCNC: 0.76 MG/DL — SIGNIFICANT CHANGE UP (ref 0.5–1.3)
EGFR: 98 ML/MIN/1.73M2 — SIGNIFICANT CHANGE UP
EOSINOPHIL # BLD AUTO: 0.13 K/UL — SIGNIFICANT CHANGE UP (ref 0–0.5)
EOSINOPHIL NFR BLD AUTO: 3 % — SIGNIFICANT CHANGE UP (ref 0–6)
GLUCOSE SERPL-MCNC: 103 MG/DL — HIGH (ref 70–99)
HCT VFR BLD CALC: 40.7 % — SIGNIFICANT CHANGE UP (ref 39–50)
HGB BLD-MCNC: 13.2 G/DL — SIGNIFICANT CHANGE UP (ref 13–17)
IMM GRANULOCYTES NFR BLD AUTO: 0.2 % — SIGNIFICANT CHANGE UP (ref 0–0.9)
INR BLD: 1 RATIO — SIGNIFICANT CHANGE UP (ref 0.85–1.16)
LYMPHOCYTES # BLD AUTO: 1.43 K/UL — SIGNIFICANT CHANGE UP (ref 1–3.3)
LYMPHOCYTES # BLD AUTO: 32.6 % — SIGNIFICANT CHANGE UP (ref 13–44)
MCHC RBC-ENTMCNC: 27.8 PG — SIGNIFICANT CHANGE UP (ref 27–34)
MCHC RBC-ENTMCNC: 32.4 G/DL — SIGNIFICANT CHANGE UP (ref 32–36)
MCV RBC AUTO: 85.9 FL — SIGNIFICANT CHANGE UP (ref 80–100)
MONOCYTES # BLD AUTO: 0.44 K/UL — SIGNIFICANT CHANGE UP (ref 0–0.9)
MONOCYTES NFR BLD AUTO: 10 % — SIGNIFICANT CHANGE UP (ref 2–14)
NEUTROPHILS # BLD AUTO: 2.32 K/UL — SIGNIFICANT CHANGE UP (ref 1.8–7.4)
NEUTROPHILS NFR BLD AUTO: 53.1 % — SIGNIFICANT CHANGE UP (ref 43–77)
NRBC # BLD: 0 /100 WBCS — SIGNIFICANT CHANGE UP (ref 0–0)
NRBC BLD-RTO: 0 /100 WBCS — SIGNIFICANT CHANGE UP (ref 0–0)
NT-PROBNP SERPL-SCNC: 137 PG/ML — SIGNIFICANT CHANGE UP (ref 0–300)
PLATELET # BLD AUTO: 187 K/UL — SIGNIFICANT CHANGE UP (ref 150–400)
POTASSIUM SERPL-MCNC: 4.1 MMOL/L — SIGNIFICANT CHANGE UP (ref 3.5–5.3)
POTASSIUM SERPL-SCNC: 4.1 MMOL/L — SIGNIFICANT CHANGE UP (ref 3.5–5.3)
PROT SERPL-MCNC: 7.4 G/DL — SIGNIFICANT CHANGE UP (ref 6–8.3)
PROTHROM AB SERPL-ACNC: 11.4 SEC — SIGNIFICANT CHANGE UP (ref 9.9–13.4)
RBC # BLD: 4.74 M/UL — SIGNIFICANT CHANGE UP (ref 4.2–5.8)
RBC # FLD: 14.1 % — SIGNIFICANT CHANGE UP (ref 10.3–14.5)
SODIUM SERPL-SCNC: 141 MMOL/L — SIGNIFICANT CHANGE UP (ref 135–145)
TROPONIN T, HIGH SENSITIVITY RESULT: 10 NG/L — SIGNIFICANT CHANGE UP (ref 0–51)
WBC # BLD: 4.38 K/UL — SIGNIFICANT CHANGE UP (ref 3.8–10.5)
WBC # FLD AUTO: 4.38 K/UL — SIGNIFICANT CHANGE UP (ref 3.8–10.5)

## 2025-02-10 PROCEDURE — 85025 COMPLETE CBC W/AUTO DIFF WBC: CPT

## 2025-02-10 PROCEDURE — 93454 CORONARY ARTERY ANGIO S&I: CPT

## 2025-02-10 PROCEDURE — 93460 R&L HRT ART/VENTRICLE ANGIO: CPT

## 2025-02-10 PROCEDURE — 93454 CORONARY ARTERY ANGIO S&I: CPT | Mod: 26

## 2025-02-10 PROCEDURE — 82553 CREATINE MB FRACTION: CPT

## 2025-02-10 PROCEDURE — 99152 MOD SED SAME PHYS/QHP 5/>YRS: CPT

## 2025-02-10 PROCEDURE — 99285 EMERGENCY DEPT VISIT HI MDM: CPT | Mod: GC

## 2025-02-10 PROCEDURE — C1894: CPT

## 2025-02-10 PROCEDURE — 80053 COMPREHEN METABOLIC PANEL: CPT

## 2025-02-10 PROCEDURE — C1887: CPT

## 2025-02-10 PROCEDURE — 36415 COLL VENOUS BLD VENIPUNCTURE: CPT

## 2025-02-10 PROCEDURE — 71046 X-RAY EXAM CHEST 2 VIEWS: CPT

## 2025-02-10 PROCEDURE — 99285 EMERGENCY DEPT VISIT HI MDM: CPT

## 2025-02-10 PROCEDURE — 85730 THROMBOPLASTIN TIME PARTIAL: CPT

## 2025-02-10 PROCEDURE — 83880 ASSAY OF NATRIURETIC PEPTIDE: CPT

## 2025-02-10 PROCEDURE — 85610 PROTHROMBIN TIME: CPT

## 2025-02-10 PROCEDURE — 84484 ASSAY OF TROPONIN QUANT: CPT

## 2025-02-10 PROCEDURE — C1769: CPT

## 2025-02-10 PROCEDURE — 71046 X-RAY EXAM CHEST 2 VIEWS: CPT | Mod: 26

## 2025-02-10 PROCEDURE — 82550 ASSAY OF CK (CPK): CPT

## 2025-02-10 RX ORDER — BACTERIOSTATIC SODIUM CHLORIDE 0.9 %
1000 VIAL (ML) INJECTION
Refills: 0 | Status: DISCONTINUED | OUTPATIENT
Start: 2025-02-10 | End: 2025-02-10

## 2025-02-10 RX ORDER — BACTERIOSTATIC SODIUM CHLORIDE 0.9 %
1000 VIAL (ML) INJECTION
Refills: 0 | Status: COMPLETED | OUTPATIENT
Start: 2025-02-10 | End: 2025-02-10

## 2025-02-10 RX ORDER — ASPIRIN 81 MG/1
324 TABLET, COATED ORAL ONCE
Refills: 0 | Status: COMPLETED | OUTPATIENT
Start: 2025-02-10 | End: 2025-02-10

## 2025-02-10 RX ADMIN — ASPIRIN 324 MILLIGRAM(S): 81 TABLET, COATED ORAL at 10:13

## 2025-02-10 RX ADMIN — Medication 75 MILLILITER(S): at 14:46

## 2025-02-10 RX ADMIN — Medication 75 MILLIGRAM(S): at 10:13

## 2025-02-10 NOTE — ED ADULT TRIAGE NOTE - CHIEF COMPLAINT QUOTE
chest pain r/t L arm and shortness of breath x few days.   Recently here in September for similar complaint and received cardiac stents.   Hx 12 cardiac stents, gastric bypass (on plavix, aspirin)

## 2025-02-10 NOTE — ASU DISCHARGE PLAN (ADULT/PEDIATRIC) - FINANCIAL ASSISTANCE
[facility] provides services at a reduced cost to those who are determined to be eligible through [facility]’s financial assistance program. Information regarding [facility]’s financial assistance program can be found by going to [email or web address] or by calling [phone].

## 2025-02-10 NOTE — ED PROVIDER NOTE - OBJECTIVE STATEMENT
Dr. Watson: 69-year-old male history of CAD with 12 stents, hypertension, hyperlipidemia, type 2 diabetes, CHRISTIANE on CPAP, GERD, status post gastric bypass surgery, last cardiac stent September 2024, on aspirin and Plavix, presenting with chest pressure radiating to the left arm and shortness of breath for the last few days.  As per son at bedside every time patient has symptoms like this he ends up with a stent.  Denies fevers, chills, nausea, vomiting, abdominal pain, rashes.  Patient declined , wants son at bedside to interpret.

## 2025-02-10 NOTE — ED ADULT NURSE NOTE - NSFALLHARMRISKINTERV_ED_ALL_ED

## 2025-02-10 NOTE — ED ADULT NURSE NOTE - OBJECTIVE STATEMENT
68 y/o M complaining of chest pain x3-4 days. Pt took a nitroglycerin x2 last night without relief. Pt states the pain is constant, burning, and radiates down left arm. Pt also short of breath. Denies headache, blurry vision, n/v, diaphoresis, chills. PMH 2 MI's with 12 stents. AAOx4. Breathing spontaneously. Skin warm, dry, and color normal for race.

## 2025-02-10 NOTE — ASU DISCHARGE PLAN (ADULT/PEDIATRIC) - CARE PROVIDER_API CALL
[facility]  [address] [address]  Phone: [phone]  Fax: [phone]  Follow Up Time: 1 month

## 2025-02-10 NOTE — ASU DISCHARGE PLAN (ADULT/PEDIATRIC) - PROCEDURE
Addended by: SOFIA CORTES on: 11/22/2024 10:35 AM     Modules accepted: Orders    
diagnostic left heart catheterization

## 2025-02-10 NOTE — ED PROVIDER NOTE - ATTENDING CONTRIBUTION TO CARE
Dr. Watson: I have personally performed a face to face bedside history and physical examination of this patient. I have discussed the history, examination, review of systems, assessment and plan of management with the resident. I have reviewed the electronic medical record and amended it to reflect my history, review of systems, physical exam, assessment and plan.    Dr. Watson: This H&P has been written by myself in its entirety

## 2025-02-10 NOTE — ASU DISCHARGE PLAN (ADULT/PEDIATRIC) - NS MD DC FALL RISK RISK
For information on Fall & Injury Prevention, visit: [email or web address] OR  [email or web address] OR  https://www.cdc.gov/steadi/patient.html

## 2025-02-10 NOTE — PATIENT PROFILE ADULT - FALL HARM RISK - HARM RISK INTERVENTIONS

## 2025-02-10 NOTE — H&P CARDIOLOGY - HISTORY OF PRESENT ILLNESS
isosorbide mononitrate (IMDUR) extended release tablet 30 mg (30 mg Oral Not Given 1/16/24 2114)   chlorthalidone (HYGROTON) tablet 25 mg (25 mg Oral Not Given 1/16/24 2114)   iopamidol (ISOVUE-370) 76 % injection 100 mL (100 mLs IntraVENous Given 1/16/24 1918)       CONSULTS: (Who and What was discussed)  IP CONSULT TO INTERVENTIONAL RADIOLOGY  IP CONSULT TO PULMONOLOGY  IP CONSULT TO CARDIOLOGY     Social Determinants affecting Dx or Tx: None    Smoking Cessation: Not Applicable    PROCEDURES   Unless otherwise noted above, none  Procedures      CRITICAL CARE TIME   CRITICAL CARE NOTE :    9:39 PM    IMPENDING DETERIORATION -Airway, Respiratory, and Cardiovascular  ASSOCIATED RISK FACTORS - Hypotension, Shock, and Hypoxia  MANAGEMENT- Bedside Assessment and Supervision of Care  INTERPRETATION -  Xrays, CT Scan, ECG, Blood Pressure, and Cardiac Output Measures   INTERVENTIONS - hemodynamic mgmt and respiratory mgmt  CASE REVIEW - Hospitalist/Intensivist, Nursing, and Family  TREATMENT RESPONSE -Improved  PERFORMED BY - Self    NOTES:  I have spent 50 minutes of critical care time involved in lab review, consultations with specialist, family decision- making, bedside attention and documentation. Time is exclusive of EKG interpretation, imaging interpretation and separately billed procedures.  During this entire length of time I was immediately available to the patient.  He Musa MD    ED FINAL IMPRESSION     1. Acute respiratory failure with hypoxia (HCC)    2. Anemia, unspecified type          DISPOSITION/PLAN   DISPOSITION Admitted 01/16/2024 08:17:34 PM    Admit Note: Pt is being admitted by mayitoiris. The results of their tests and reason(s) for their admission have been discussed with pt and/or available family. They convey agreement and understanding for the need to be admitted and for the admission diagnosis.     PATIENT REFERRED TO:  No follow-up provider specified.      DISCHARGE MEDICATIONS:   
69-year-old male history of CAD with 12 stents, hypertension, hyperlipidemia, type 2 diabetes, CHRISTIANE on CPAP, GERD, status post gastric bypass surgery, last cardiac stent September 2024, on aspirin and Plavix, presenting with chest pressure radiating to the left arm and shortness of breath for the last few days.  As per son at bedside every time patient has symptoms like this he ends up with a stent.  Denies fevers, chills, nausea, vomiting, abdominal pain, rashes.    Cardiology Summary  Interventional Findings:   Mid left anterior descending: The initial stenosis was 90 %. Guidewire  crossing was successful.    A successful Drug Eluting Stent was deployed using a 6FR EBU 4.0  LAUNCHER, a MINAMO STRAIGHT 190CM, and a 2.5 X  18 ReVera MISSION.      TTE 5/21  1. Mitral annular calcification and calcified mitral  leaflets with normal diastolic opening.  2. Aortic valve not well visualized; appears calcified.  3. Normal left ventricular internal dimensions and wall  thicknesses.  4. Low/Normal left ventricular systolic function.  No  obvious or severe wall motion abnormalities.  5. Normal right ventricular size and function.

## 2025-02-10 NOTE — ED PROVIDER NOTE - CLINICAL SUMMARY MEDICAL DECISION MAKING FREE TEXT BOX
Dr. Watson: Well-appearing patient with a strong cardiac history presenting with symptoms similar to prior unstable angina.  EKG unremarkable.  Will give patient his aspirin and Plavix, discuss with his cardiologist Dr. Yeh, and admit patient. Dr. Watson: Well-appearing patient with a strong cardiac history presenting with symptoms similar to prior unstable angina.  EKG unremarkable.  Will give patient his aspirin and Plavix, discuss with his cardiologist Dr. Yeh, and admit patient.    Halle Calderon MD, PGY3:  69-year-old male with past medical history of CAD/MI status post 12 stents aspirin and Plavix presents for evaluation of left-sided flank pain radiating to the left shoulder and left scapula consistent with prior episodes of ACS.  High suspicion for ACS given patient's history.  Will obtain labs including cardiac enzymes chest x-ray.  EKG is nonischemic at this time.  Given history patient will likely need inpatient admission and repeat cath.  Will reach out to patient's cardiologist for further management recommendations

## 2025-02-10 NOTE — H&P CARDIOLOGY - EKG AND INTERPRETATION
SB w ST and T wave abnormalities consider anterolateral ischemia T wave inversions in II, III ( q wave), AVF . V4 TWI, V5 TWI, V6 TWI)

## 2025-07-11 ENCOUNTER — APPOINTMENT (OUTPATIENT)
Dept: VASCULAR SURGERY | Facility: CLINIC | Age: 70
End: 2025-07-11